# Patient Record
Sex: MALE | Race: WHITE | Employment: FULL TIME | URBAN - METROPOLITAN AREA
[De-identification: names, ages, dates, MRNs, and addresses within clinical notes are randomized per-mention and may not be internally consistent; named-entity substitution may affect disease eponyms.]

---

## 2017-01-09 ENCOUNTER — OFFICE VISIT (OUTPATIENT)
Dept: INTERNAL MEDICINE CLINIC | Age: 52
End: 2017-01-09

## 2017-01-09 VITALS
HEART RATE: 98 BPM | DIASTOLIC BLOOD PRESSURE: 103 MMHG | BODY MASS INDEX: 35.7 KG/M2 | OXYGEN SATURATION: 98 % | HEIGHT: 70 IN | WEIGHT: 249.38 LBS | TEMPERATURE: 98 F | SYSTOLIC BLOOD PRESSURE: 157 MMHG | RESPIRATION RATE: 18 BRPM

## 2017-01-09 DIAGNOSIS — I10 HTN (HYPERTENSION), BENIGN: Primary | ICD-10-CM

## 2017-01-09 RX ORDER — AMLODIPINE BESYLATE 5 MG/1
5 TABLET ORAL DAILY
Qty: 30 TAB | Refills: 5 | Status: SHIPPED | OUTPATIENT
Start: 2017-01-09 | End: 2017-05-17 | Stop reason: ALTCHOICE

## 2017-01-09 RX ORDER — PENTOXIFYLLINE 400 MG/1
400 TABLET, EXTENDED RELEASE ORAL
COMMUNITY
Start: 2016-12-29 | End: 2017-03-13

## 2017-01-09 NOTE — MR AVS SNAPSHOT
Visit Information Date & Time Provider Department Dept. Phone Encounter #  
 1/9/2017  8:15 AM Princess Millan MD [facility] [address] [facility] [id number] Follow-up Instructions Return in about 3 months (around 4/9/2017). Your Appointments 3/8/2017  9:20 AM  
ESTABLISHED PATIENT with Vin Abbott MD  
[facility] ([facility]) Appt Note: 6 month follow up  
 Mariia Holler [address]  
[address] 1000 [facility]  
  
    
 6/28/2017 10:00 AM  
ESTABLISHED PATIENT with Carlos Foster MD  
[facility] ([facility]) [address] [facility] 600 1007 [facility]  
54 [facility] Upcoming Health Maintenance Date Due FOBT Q 1 YEAR AGE 50-75 4/4/2016 INFLUENZA AGE 9 TO ADULT 8/1/2016 MICROALBUMIN Q1 5/3/2017 LIPID PANEL Q1 5/3/2017 HEMOGLOBIN A1C Q6M 5/16/2017 FOOT EXAM Q1 7/25/2017 EYE EXAM RETINAL OR DILATED Q1 10/7/2017 Pneumococcal 19-64 Highest Risk (3 of 3 - PPSV23) 9/1/2021 DTaP/Tdap/Td series (3 - Td) 12/14/2026 Allergies as of 1/9/2017  Review Complete On: 1/9/2017 By: Gabrielle Howe LPN Severity Noted Reaction Type Reactions Cleocin [Clindamycin Hcl]  11/29/2011    Rash Current Immunizations  Reviewed on 12/14/2016 Name Date Influenza Vaccine 9/12/2015, 9/28/2014,  Deferred (Patient Refused) Pneumococcal Polysaccharide (PPSV-23) 9/1/2016 Pneumococcal Vaccine (Pcv) 3/10/2009 TDAP Vaccine 9/16/2008 Tdap 12/14/2016 Not reviewed this visit You Were Diagnosed With   
  
 Codes Comments HTN (hypertension), benign    -  Primary ICD-10-CM: I10 
ICD-9-CM: 401.1 Vitals BP Pulse Temp Resp Height(growth percentile) Weight(growth percentile) (!) 157/103 (BP 1 Location: Left arm, BP Patient Position: Sitting) 98 98 °F (36.7 °C) (Oral) 18 5' 10\" (1.778 m) 249 lb 6 oz (113.1 kg) SpO2 BMI Smoking Status 98% 35.78 kg/m2 Never Smoker Vitals History BMI and BSA Data Body Mass Index Body Surface Area 35.78 kg/m 2 2.36 m 2 Preferred Pharmacy Pharmacy Name Phone [facility] [address] [place] [address] [address] Your Updated Medication List  
  
   
This list is accurate as of: 1/9/17  8:59 AM.  Always use your most recent med list. amLODIPine 5 mg tablet Commonly known as:  Jamie Ruths Take 1 Tab by mouth daily. aspirin delayed-release 81 mg tablet Take 1 Tab by mouth daily. atorvastatin 80 mg tablet Commonly known as:  LIPITOR Take 1 Tab by mouth nightly. clopidogrel 75 mg Tab Commonly known as:  PLAVIX TAKE ONE TABLET BY MOUTH ONCE DAILY fosinopril 40 mg tablet Commonly known as:  MONOPRIL  
TAKE ONE TABLET BY MOUTH ONCE DAILY  
  
 glipiZIDE SR 10 mg CR tablet Commonly known as:  GLUCOTROL XL Take 10 mg by mouth daily. hydroCHLOROthiazide 25 mg tablet Commonly known as:  HYDRODIURIL  
TAKE ONE TABLET BY MOUTH ONCE DAILY JARDIANCE 25 mg tablet Generic drug:  empagliflozin 25 mg.  
  
 metFORMIN 1,000 mg tablet Commonly known as:  GLUCOPHAGE  
TAKE ONE TABLET BY MOUTH TWICE DAILY WITH  MEALS  
  
 miSOPROStol 200 mcg tablet Commonly known as:  CYTOTEC  
TAKE ONE TABLET BY MOUTH TWICE DAILY  
  
 multivitamin tablet Commonly known as:  ONE A DAY Take 1 Tab by mouth daily. omega-3 acid ethyl esters 1 gram capsule Commonly known as:  Kristen Spatz TAKE TWO CAPSULES BY MOUTH TWICE DAILY WITH FOOD  
  
 ONETOUCH ULTRA TEST strip Generic drug:  glucose blood VI test strips ONETOUCH ULTRA2 monitoring kit Generic drug:  Blood-Glucose Meter OTHER  
  
 pantoprazole 40 mg tablet Commonly known as:  PROTONIX Take 1 Tab by mouth daily. pentoxifylline  mg CR tablet Commonly known as:  TRENTAL Take 400 mg by mouth three (3) times daily (with meals). SITagliptin 100 mg tablet Commonly known as:  Portia Bannister Take 1 Tab by mouth daily. TESTOSTERONE IM  
by IntraMUSCular route. Every 10 weeks. Aveed Prescriptions Sent to Pharmacy Refills  
 amLODIPine (NORVASC) 5 mg tablet 5 Sig: Take 1 Tab by mouth daily. Class: Normal  
 Pharmacy: [address]  #: [phone] Route: Oral  
  
Follow-up Instructions Return in about 3 months (around 4/9/2017). Introducing [facility] & HEALTH SERVICES! Dear Lara Ortiz: Thank you for requesting a [employer] account. Our records indicate that you already have an active [employer] account. You can access your account anytime at [email or web address]. Did you know that you can access your hospital and ER discharge instructions at any time in [employer]? You can also review all of your test results from your hospital stay or ER visit. Additional Information If you have questions, please visit the Frequently Asked Questions section of the [employer] website at [email or web address]. Remember, [employer] is NOT to be used for urgent needs. For medical emergencies, dial 911. Now available from your iPhone and Android! Please provide this summary of care documentation to your next provider. Your primary care clinician is listed as Juan Gutierrez. If you have any questions after today's visit, please call [phone].

## 2017-01-09 NOTE — PROGRESS NOTES
HISTORY OF PRESENT ILLNESS  Autumn Carlos is a 49 y.o. male. HPI  Hypertension:  Autumn Carlos is a 49 y.o. male with hypertension. without Chronic kidney disease stage    Medication change since last visit: No  The patient reports:  taking medications as instructed, no medication side effects noted, home BP monitoring in range of 127'J systolic over 02'G diastolic, no chest pain on exertion, no dyspnea on exertion, no swelling of ankles, no orthostatic dizziness or lightheadedness, no palpitations. Lifestyle modification/social history: generally follows a low fat low cholesterol diet, generally follows a low sodium diet, exercises sporadically, nonsmoker    Lab Results   Component Value Date/Time    Sodium 138 03/08/2016 09:30 AM    Potassium 4.7 03/08/2016 09:30 AM    Chloride 98 03/08/2016 09:30 AM    CO2 25 03/08/2016 09:30 AM    Anion gap 6 10/03/2015 03:58 AM    Glucose 262 03/08/2016 09:30 AM    BUN 17 03/08/2016 09:30 AM    Creatinine 1.07 03/08/2016 09:30 AM    BUN/Creatinine ratio 16 03/08/2016 09:30 AM    GFR est AA 94 03/08/2016 09:30 AM    GFR est non-AA 81 03/08/2016 09:30 AM    Calcium 9.7 03/08/2016 09:30 AM           ROS    Physical Exam  Visit Vitals    BP (!) 157/103 (BP 1 Location: Left arm, BP Patient Position: Sitting)    Pulse 98    Temp 98 °F (36.7 °C) (Oral)    Resp 18    Ht 5' 10\" (1.778 m)    Wt 249 lb 6 oz (113.1 kg)    SpO2 98%    BMI 35.78 kg/m2       ASSESSMENT and PLAN  Diagnoses and all orders for this visit:    HTN (hypertension), benign - uncontrolled. Resume amlodipine. Log blood pressures at home while sitting, relaxed 3-5 times weekly and bring to next visit. Pt educated on goal BP of 130/80 on average or lower. Call office as soon as possible if BP's over 140/90 or below 110/50 on multiple occasions and/or with symptoms of dizziness, chest pain, shortness of breath, headache or ankle swelling.   Recheck log and bp here in 3 month(s) with me, 2 months with Dr. Deonna Ho. -     amLODIPine (NORVASC) 5 mg tablet; Take 1 Tab by mouth daily. Follow-up Disposition:  Return in about 3 months (around 4/9/2017).

## 2017-03-13 ENCOUNTER — OFFICE VISIT (OUTPATIENT)
Dept: CARDIOLOGY CLINIC | Age: 52
End: 2017-03-13

## 2017-03-13 VITALS
RESPIRATION RATE: 16 BRPM | WEIGHT: 258 LBS | OXYGEN SATURATION: 98 % | BODY MASS INDEX: 36.94 KG/M2 | SYSTOLIC BLOOD PRESSURE: 118 MMHG | HEIGHT: 70 IN | DIASTOLIC BLOOD PRESSURE: 70 MMHG | HEART RATE: 80 BPM

## 2017-03-13 DIAGNOSIS — I48.91 ATRIAL FIBRILLATION, UNSPECIFIED TYPE (HCC): Primary | ICD-10-CM

## 2017-03-13 NOTE — PROGRESS NOTES
HISTORY OF PRESENT ILLNESS  Suad Chavez is a 49 y.o. male. history of HTN , HLP, CAD/MI in 2008. S/p PCI LAD in 2008 and additional stent in LAD in 2011. He also has residual 70% RV branch stenosis. Admitted to [facility] with AF and RVR on 7/15 nuclear stress test at that time with no ischemia, apical attenuation ( sub endo mi) and EF 54%. Echo also on 7/15: EF 55-60% no rwma,  bicuspid AV and mild AS  Admitted to [facility] on 7/15 for recurrent AF. DOLORES showed no NEWTON, normal LV and tri leaflet AV with possible partial fusion of 2 leaflets, mild AS, AI and MR . He failed cardioversion with persistent AF   successful dc cardioversion on 7/15 while on multaq  Recurrent AF afterwards on 7/15  and eventually AF ablation with Dr. Alana Spangler on 9/15  Stopped toprol on his own accord on 11/15 on account of excessive sob  He has GB disease and stones 2015  Echo on 8/16:Left ventricle: Size was normal. Systolic function was normal by visual  assessment. Ejection fraction was estimated to be 60 %. There were no  regional wall motion abnormalities. Wall thickness was mildly increased. Aortic valve: There was an apparent echogenic mass of tissue appearing in  the outflow tract adherent to the right coronary cusp; unknown if  clinically significant. Leaflets exhibited moderately increased thickness,  moderate calcification, and mildly reduced cuspal separation. Transaortic  velocity was increased due to valvular stenosis. There was mild stenosis. VIRA was 2.0 cm2, peak/mean gradients were 29/17 mmHg, and the DI was 0.45. There was mild regurgitation. Aorta, systemic arteries:  The root exhibited normal size; however, the  ascending aorta was mildly enlarged; sinus of valsalva was 3.8 cm, the  ST-J was 3.5 cm, and the ascending aorta was 4.0 cm.            Past Medical History    Diagnosis  Date      ZAK (obstructive sleep apnea)  3/31/2010      Type II or unspecified type diabetes mellitus without mention of complication, not stated as uncontrolled ([facility])  3/31/2007      HTN (hypertension), benign  3/31/2010      Mixed hyperlipidemia  3/31/2010      GERD (gastroesophageal reflux disease)  3/31/2010      MI (myocardial infarction) ([facility])  2/26/2007          stent x2, 3rd stent ~5yrs later, Plavix      Obese  3/31/2010      Hypercholesterolemia         Arthritis         Headache(784.0)         Low serum testosterone level  07/2013      A-fib ([facility])  7/9/2015          Multaq Eliquis                    Past Surgical History    Procedure  Laterality  Date      Hx orthopaedic     2007          L Knee Arthroscopy      Pr cardiac surg procedure unlist                4 stents placed 2632,3275      Hx angioplasty            Hx afib ablation              History                  Social History      Marital Status:            Spouse Name:  N/A          Number of Children:  N/A      Years of Education:  N/A              Occupational History      Not on file.                  Social History Main Topics      Smoking status:  Never Smoker       Smokeless tobacco:  Never Used      Alcohol Use:  No      Drug Use:  No      Sexual Activity:            Partners:  Female          Birth Control/ Protection:  None                Other Topics  Concern      Not on file        Social History Narrative         HPI  Doing well no complaints continues to work as a  with no issues  Review of Systems   Respiratory: Negative. Cardiovascular: Negative. Visit Vitals    /70 (BP 1 Location: Left arm, BP Patient Position: Sitting)    Pulse 80    Resp 16    Ht 5' 10\" (1.778 m)    Wt 117 kg (258 lb)    SpO2 98%    BMI 37.02 kg/m2       Physical Exam   Neck: No JVD present. Carotid bruit is not present. Cardiovascular: Normal rate, regular rhythm and S2 normal.    Murmur heard. Systolic murmur is present with a grade of 1/6   Pulmonary/Chest: Effort normal and breath sounds normal.   Abdominal: Soft. Musculoskeletal: He exhibits no edema. Psychiatric: He has a normal mood and affect. Current Outpatient Prescriptions on File Prior to Visit   Medication Sig Dispense Refill    CARTIA  mg ER capsule TAKE ONE CAPSULE BY MOUTH DAILY 90 Cap 3    amLODIPine (NORVASC) 5 mg tablet Take 1 Tab by mouth daily. 30 Tab 5    clopidogrel (PLAVIX) 75 mg tab TAKE ONE TABLET BY MOUTH ONCE DAILY 90 Tab 3    miSOPROStol (CYTOTEC) 200 mcg tablet TAKE ONE TABLET BY MOUTH TWICE DAILY 60 Tab 5    hydroCHLOROthiazide (HYDRODIURIL) 25 mg tablet TAKE ONE TABLET BY MOUTH ONCE DAILY 90 Tab 0    fosinopril (MONOPRIL) 40 mg tablet TAKE ONE TABLET BY MOUTH ONCE DAILY 90 Tab 1    JARDIANCE 25 mg tablet 25 mg.      ONETOUCH ULTRA2 monitoring kit       atorvastatin (LIPITOR) 80 mg tablet Take 1 Tab by mouth nightly. 90 Tab 3    OTHER       glipiZIDE SR (GLUCOTROL) 10 mg CR tablet Take 10 mg by mouth daily.  pantoprazole (PROTONIX) 40 mg tablet Take 1 Tab by mouth daily. 1 Tab 1    ONETOUCH ULTRA TEST strip       aspirin delayed-release 81 mg tablet Take 1 Tab by mouth daily. 30 Tab 3    sitaGLIPtin (JANUVIA) 100 mg tablet Take 1 Tab by mouth daily. 90 Tab 0    metFORMIN (GLUCOPHAGE) 1,000 mg tablet TAKE ONE TABLET BY MOUTH TWICE DAILY WITH  MEALS 180 Tab 0    TESTOSTERONE IM by IntraMUSCular route. Every 10 weeks. Aveed      multivitamin (ONE A DAY) tablet Take 1 Tab by mouth daily.  omega-3 acid ethyl esters (LOVAZA) 1 gram capsule TAKE TWO CAPSULES BY MOUTH TWICE DAILY WITH FOOD 360 Cap 0     No current facility-administered medications on file prior to visit.       Lab Results   Component Value Date/Time    Cholesterol, total 143 02/03/2016 09:40 AM    HDL Cholesterol 50 02/03/2016 09:40 AM    LDL,Direct 80 07/10/2015 04:44 AM    LDL, calculated 66 02/03/2016 09:40 AM    VLDL, calculated 27 02/03/2016 09:40 AM    Triglyceride 137 02/03/2016 09:40 AM    CHOL/HDL Ratio 5.6 07/10/2015 04:44 AM ASSESSMENT and PLAN  PAF: he remains in NSR after AF ablation on 9/15. dronedarone stopped on 1/1/16. Previous holter with no AF and NSR. His [id number] VASC score is 3 with 3.2 % cva risk. Continue asa and plavix. Now off eliquis. He will alert me right away if any palpitations fatigue sob and off course chest pain. Stay off toprol for now since he feels much better without it and continue with cardizem  Once again He tells me that when he was in NSR he felt absolutely great therefore he is very symptomatic when in AF    CAD: stable with no symptoms, normal nuclear stress test on 7/15, no additional testing for now. His ECG shows NSR prwp anterior and no significant st t changes and small non diagnostic inferior q waves  Consider stress echo at the next office visit to follow up on cad and AS  HTN:controlled at this time  HLD: continue lipitor, well controlled lipids and closely followed by his PCP  AS: mild some fusion of 2 cusps. Continue echo surveillance. Discussed results of echo in 8/16. No changes in Aortic stenosis echogenic tissue in av outflow of unclear etiology. Discussed with patient for now no additional testing. Obesity: discussed importance of weight loss and exercise he has lost 12 lbs since his last office visit!   See him back in 6 months or sooner if any issues

## 2017-03-13 NOTE — MR AVS SNAPSHOT
Visit Information Date & Time Provider Department Dept. Phone Encounter #  
 3/13/2017 10:20 AM Justine Aleman MD [facility] [phone] [id number] Your Appointments 4/18/2017  9:45 AM  
ROUTINE CARE with Chantell Strickland MD  
[facility]) Appt Note: 3 month [address]  
  
   
 [phone]  
  
    
 6/28/2017 10:00 AM  
ESTABLISHED PATIENT with Tino Carrillo MD  
[facility] ([facility]) [address] [facility] [address] [place] [address] Road  
[address] [place] [address] [id number] [place] [address] Upcoming Health Maintenance Date Due FOBT Q 1 YEAR AGE 50-75 4/4/2016 INFLUENZA AGE 9 TO ADULT 8/1/2016 MICROALBUMIN Q1 5/3/2017 LIPID PANEL Q1 5/3/2017 HEMOGLOBIN A1C Q6M 5/16/2017 FOOT EXAM Q1 7/25/2017 EYE EXAM RETINAL OR DILATED Q1 10/7/2017 Pneumococcal 19-64 Highest Risk (3 of 3 - PPSV23) 9/1/2021 DTaP/Tdap/Td series (3 - Td) 12/14/2026 Allergies as of 3/13/2017  Review Complete On: 3/13/2017 By: Arcadio Robertson Severity Noted Reaction Type Reactions Cleocin [Clindamycin Hcl]  11/29/2011    Rash Current Immunizations  Reviewed on 12/14/2016 Name Date Influenza Vaccine 9/12/2015, 9/28/2014,  Deferred (Patient Refused) Pneumococcal Polysaccharide (PPSV-23) 9/1/2016 Pneumococcal Vaccine (Pcv) 3/10/2009 TDAP Vaccine 9/16/2008 Tdap 12/14/2016 Not reviewed this visit You Were Diagnosed With   
  
 Codes Comments Atrial fibrillation, unspecified type [facility]    -  Primary ICD-10-CM: I48.91 
ICD-9-CM: 427.31 Vitals  BP Pulse Resp Height(growth percentile) Weight(growth percentile) SpO2  
 118/70 (BP 1 Location: Left arm, BP Patient Position: Sitting) 80 16 5' 10\" (1.778 m) 258 lb (117 kg) 98% BMI Smoking Status 37.02 kg/m2 Never Smoker Vitals History BMI and BSA Data Body Mass Index Body Surface Area 37.02 kg/m 2 2.4 m 2 Preferred Pharmacy Pharmacy Name Phone [facility] [address] Your Updated Medication List  
  
   
This list is accurate as of: 3/13/17 11:44 AM.  Always use your most recent med list. amLODIPine 5 mg tablet Commonly known as:  Velia Croon Take 1 Tab by mouth daily. aspirin delayed-release 81 mg tablet Take 1 Tab by mouth daily. atorvastatin 80 mg tablet Commonly known as:  LIPITOR Take 1 Tab by mouth nightly. CARTIA  mg ER capsule Generic drug:  dilTIAZem CD  
TAKE ONE CAPSULE BY MOUTH DAILY  
  
 clopidogrel 75 mg Tab Commonly known as:  PLAVIX TAKE ONE TABLET BY MOUTH ONCE DAILY fosinopril 40 mg tablet Commonly known as:  MONOPRIL  
TAKE ONE TABLET BY MOUTH ONCE DAILY  
  
 glipiZIDE SR 10 mg CR tablet Commonly known as:  GLUCOTROL XL Take 10 mg by mouth daily. hydroCHLOROthiazide 25 mg tablet Commonly known as:  HYDRODIURIL  
TAKE ONE TABLET BY MOUTH ONCE DAILY JARDIANCE 25 mg tablet Generic drug:  empagliflozin 25 mg.  
  
 metFORMIN 1,000 mg tablet Commonly known as:  GLUCOPHAGE  
TAKE ONE TABLET BY MOUTH TWICE DAILY WITH  MEALS  
  
 miSOPROStol 200 mcg tablet Commonly known as:  CYTOTEC  
TAKE ONE TABLET BY MOUTH TWICE DAILY  
  
 multivitamin tablet Commonly known as:  ONE A DAY Take 1 Tab by mouth daily. omega-3 acid ethyl esters 1 gram capsule Commonly known as:  Nighat Rendon TAKE TWO CAPSULES BY MOUTH TWICE DAILY WITH FOOD  
  
 ONETOUCH ULTRA TEST strip Generic drug:  glucose blood VI test strips ONETOUCH ULTRA2 monitoring kit Generic drug:  Blood-Glucose Meter OTHER  
  
 pantoprazole 40 mg tablet Commonly known as:  PROTONIX Take 1 Tab by mouth daily. SITagliptin 100 mg tablet Commonly known as:  [place]leton Ree Take 1 Tab by mouth daily. TESTOSTERONE IM  
by IntraMUSCular route. Every 10 weeks. Aveed We Performed the Following AMB POC EKG ROUTINE W/ 12 LEADS, INTER & REP [66894 CPT(R)] Patient Instructions Follow up with Dr. Jesika Reynolds in 6 months. Introducing [facility] & HEALTH SERVICES! Dear Nicole Singh: Thank you for requesting a [employer] account. Our records indicate that you already have an active [employer] account. You can access your account anytime at [email or web address] Did you know that you can access your hospital and ER discharge instructions at any time in [employer]? You can also review all of your test results from your hospital stay or ER visit. Additional Information If you have questions, please visit the Frequently Asked Questions section of the [employer] website at [email or web address] Remember, [employer] is NOT to be used for urgent needs. For medical emergencies, dial 911. Now available from your iPhone and Android! Please provide this summary of care documentation to your next provider. Your primary care clinician is listed as Melchor Bean. If you have any questions after today's visit, please call [phone].

## 2017-03-28 ENCOUNTER — OFFICE VISIT (OUTPATIENT)
Dept: INTERNAL MEDICINE CLINIC | Age: 52
End: 2017-03-28

## 2017-03-28 VITALS
TEMPERATURE: 97.9 F | HEART RATE: 92 BPM | HEIGHT: 70 IN | SYSTOLIC BLOOD PRESSURE: 146 MMHG | BODY MASS INDEX: 36.99 KG/M2 | DIASTOLIC BLOOD PRESSURE: 96 MMHG | OXYGEN SATURATION: 98 % | RESPIRATION RATE: 14 BRPM | WEIGHT: 258.4 LBS

## 2017-03-28 DIAGNOSIS — M54.50 CHRONIC BILATERAL LOW BACK PAIN WITHOUT SCIATICA: Primary | ICD-10-CM

## 2017-03-28 DIAGNOSIS — I10 HTN (HYPERTENSION), BENIGN: ICD-10-CM

## 2017-03-28 DIAGNOSIS — G89.29 CHRONIC BILATERAL LOW BACK PAIN WITHOUT SCIATICA: Primary | ICD-10-CM

## 2017-03-28 DIAGNOSIS — M54.2 NECK PAIN: ICD-10-CM

## 2017-03-28 DIAGNOSIS — E11.9 TYPE 2 DIABETES MELLITUS WITHOUT COMPLICATION, WITHOUT LONG-TERM CURRENT USE OF INSULIN (HCC): ICD-10-CM

## 2017-03-28 NOTE — PROGRESS NOTES
HISTORY OF PRESENT ILLNESS  Josselin Merchant is a 49 y.o. male. HPI     He complains of lower back pain. Pt states that he has had disc issues in his lower back in the past and current lower back pain feels similar to the pain he had in the past. Pt was doing physical therapy for his back pain and stopped this in 01/2017. Reports he was feeling a lot better with physical therapy. Pt states that he is trying to do the exercises he was taught in therapy although he thinks that the traction he had at therapy really helped him. Reports current lower back pain is dull and is exacerbated by transitioning from a sitting to a standing position. He does not have pain radiating down BLE although states he has a history of sciatica. Pt has seen a spine specialist in the past and wonders if he should follow up with him again. He also complains of neck pain. Pt states that he has felt an intermittent crick in upper neck for the past few months. He recently developed pain in left side of lower neck that is exacerbated by movement. Pt denies pain, numbness, tingling, or weakness down arms. Pt does not take Ibuprofen and states he was warned against taking this medication. Diabetic Review of Systems - medication compliance: compliant all of the time, diabetic diet compliance: noncompliant some of the time, home glucose monitoring: is performed regularly, further diabetic ROS: no polyuria or polydipsia, no chest pain, dyspnea or TIA's, no numbness, tingling or pain in extremities. Other symptoms and concerns: Reports his sugars run high and readings are in the 150's. He states that he loves sweets. Hypertension ROS: taking medications as instructed, no medication side effects noted, no TIA's, no chest pain on exertion, no dyspnea on exertion, no swelling of ankles. New concerns: Bp was 146/96 in the office today. He complains of left elbow pain that has been present for the past few months.  He has noticed more swelling in left elbow. Pain occurs with rotation, extension, and lifting objects with left arm. He denies an injury that could have caused left elbow pain. Review of Systems   Musculoskeletal: Positive for back pain and neck pain. All other systems reviewed and are negative. Physical Exam   Constitutional: He is oriented to person, place, and time. He appears well-developed and well-nourished. HENT:   Head: Normocephalic and atraumatic. Right Ear: External ear normal.   Left Ear: External ear normal.   Nose: Nose normal.   Mouth/Throat: Oropharynx is clear and moist.   Eyes: Conjunctivae and EOM are normal.   Neck: Normal range of motion. Neck supple. Carotid bruit is not present. No thyroid mass and no thyromegaly present. Cardiovascular: Normal rate, regular rhythm, normal heart sounds and intact distal pulses. Pulmonary/Chest: Effort normal and breath sounds normal.   Abdominal: Soft. Bowel sounds are normal.   Genitourinary: Testes normal.   Musculoskeletal: Normal range of motion. He exhibits tenderness (left side of neck/scapula). Left elbow: He exhibits swelling (mild). Neurological: He is alert and oriented to person, place, and time. He has normal strength. No cranial nerve deficit or sensory deficit. Skin: Skin is warm and dry. Psychiatric: He has a normal mood and affect. His behavior is normal. Judgment and thought content normal.   Nursing note and vitals reviewed. ASSESSMENT and PLAN  Saurav Flood was seen today for back pain. Diagnoses and all orders for this visit:    Chronic bilateral low back pain without sciatica  I wrote pt a prescription for physical therapy to evaluate and treat neck and back pain. I provided pt with [facility] physical therapy contact information. If pt does not experience relief with physical therapy then I will refer him to spine specialist. I wrote pt a note taking him out of work tomorrow.      Neck pain  Pt should take Naprosyn or Aleve for 7-10 days and I told pt this is okay for him to take with his kidney function and as long as it is short term. I am avoiding steroids because of his sugars. Type 2 diabetes mellitus without complication, without long-term current use of insulin (HCC)  Reports sugars are running higher although he states it is his fault and he loves sweets. Pt should continue current medications and working on diet. HTN (hypertension), benign  BP is  Not at goal but he is in pain right now I do not recommend any change in medications. I told pt that physical therapy may help left elbow pain although he should see ortho for this pain as they may be able to inject it.    reviewed diet, exercise and weight control  reviewed medications and side effects in detail     Written by Pro Mo, as dictated by Huber Lucio MD.     Current diagnosis and concerns discussed with pt at length. Understands risks and benefits or current treatment plan and medications and accepts the treatment and medication with any possible risks. Pt asks appropriate questions which were answered. Pt instructed to call with any concerns or problems.

## 2017-04-18 ENCOUNTER — OFFICE VISIT (OUTPATIENT)
Dept: INTERNAL MEDICINE CLINIC | Age: 52
End: 2017-04-18

## 2017-04-18 VITALS
HEART RATE: 95 BPM | BODY MASS INDEX: 36.99 KG/M2 | DIASTOLIC BLOOD PRESSURE: 87 MMHG | WEIGHT: 258.38 LBS | RESPIRATION RATE: 18 BRPM | HEIGHT: 70 IN | TEMPERATURE: 98.1 F | SYSTOLIC BLOOD PRESSURE: 128 MMHG | OXYGEN SATURATION: 97 %

## 2017-04-18 DIAGNOSIS — E11.9 TYPE 2 DIABETES MELLITUS WITHOUT COMPLICATION, WITHOUT LONG-TERM CURRENT USE OF INSULIN (HCC): ICD-10-CM

## 2017-04-18 DIAGNOSIS — I10 HTN (HYPERTENSION), BENIGN: Primary | ICD-10-CM

## 2017-04-18 DIAGNOSIS — E78.2 MIXED HYPERLIPIDEMIA: ICD-10-CM

## 2017-04-18 NOTE — PROGRESS NOTES
HISTORY OF PRESENT ILLNESS  Kelsey Castillo is a 47 y.o. male. HPI  Hypertension:  Kelsey Castillo is a 47 y.o. male with hypertension. without Chronic kidney disease stage    Medication change since last visit: No  The patient reports:  taking medications as instructed, no medication side effects noted, home BP monitoring in range of 486-552'T systolic over 48-20'G diastolic, no TIA's, no chest pain on exertion, no dyspnea on exertion, no swelling of ankles, no orthostatic dizziness or lightheadedness, no palpitations. Lifestyle modification/social history: generally follows a low fat low cholesterol diet, nonsmoker    Lab Results   Component Value Date/Time    Sodium 138 03/08/2016 09:30 AM    Potassium 4.7 03/08/2016 09:30 AM    Chloride 98 03/08/2016 09:30 AM    CO2 25 03/08/2016 09:30 AM    Anion gap 6 10/03/2015 03:58 AM    Glucose 262 03/08/2016 09:30 AM    BUN 17 03/08/2016 09:30 AM    Creatinine 1.07 03/08/2016 09:30 AM    BUN/Creatinine ratio 16 03/08/2016 09:30 AM    GFR est AA 94 03/08/2016 09:30 AM    GFR est non-AA 81 03/08/2016 09:30 AM    Calcium 9.7 03/08/2016 09:30 AM     Recent neck strain. Now in PT and seeing improvement. Diabetes:    He sees endocrine and due for followup there. Hyperlipidemia:  Kelsey Castillo is following up on his dyslipidemia. Cardiovascular risks for him are: LDL goal is under 80  diabetic  hypertension  obese.    Currently he takes Lipitor (atorvastatin) ,   Lab Results   Component Value Date/Time    Cholesterol, total 143 02/03/2016 09:40 AM    Cholesterol, total 169 07/10/2015 04:44 AM    Cholesterol, total 113 03/13/2015 09:12 AM    Cholesterol, total 212 06/27/2014 09:58 AM    Cholesterol, total 150 10/21/2013 09:04 AM    HDL Cholesterol 50 02/03/2016 09:40 AM    HDL Cholesterol 30 07/10/2015 04:44 AM    HDL Cholesterol 36 03/13/2015 09:12 AM    HDL Cholesterol 43 06/27/2014 09:58 AM    HDL Cholesterol 41 10/21/2013 09:04 AM    LDL,Direct 80 07/10/2015 04:44 AM    LDL, calculated 66 02/03/2016 09:40 AM    LDL, calculated Not calculated due to elevated triglyceride level 07/10/2015 04:44 AM    LDL, calculated 43 03/13/2015 09:12 AM    LDL, calculated Comment 06/27/2014 09:58 AM    LDL, calculated 57 10/21/2013 09:04 AM    Triglyceride 137 02/03/2016 09:40 AM    Triglyceride 805 07/10/2015 04:44 AM    Triglyceride 171 03/13/2015 09:12 AM    Triglyceride 408 06/27/2014 09:58 AM    Triglyceride 260 10/21/2013 09:04 AM    CHOL/HDL Ratio 5.6 07/10/2015 04:44 AM     Lab Results   Component Value Date/Time    ALT (SGPT) 37 03/08/2016 09:30 AM    AST (SGOT) 20 03/08/2016 09:30 AM    Alk.  phosphatase 69 03/08/2016 09:30 AM    Bilirubin, direct 0.17 02/03/2016 09:40 AM    Bilirubin, total 0.7 03/08/2016 09:30 AM       Myalgias: no  Fatigue: no      Patient Active Problem List   Diagnosis Code    Type II diabetes mellitus (HCC) E11.9    HTN (hypertension), benign I10    Mixed hyperlipidemia E78.2    Coronary Atherosclerosis of Native Coronary Artery- restenosis and restenting 6/2011-Dr. Tirado I25.10    GERD (gastroesophageal reflux disease) K21.9    MI (myocardial infarction) ([facility]) I21.3    Obese E66.9    ZAK (obstructive sleep apnea) G47.33    Colon polyp K63.5    Depression F32.9    Chest pain, unspecified R07.9    A-fib ([facility]) I48.91    ED (erectile dysfunction) N52.9    Hypogonadism in male E29.1    S/P ablation of atrial fibrillation Z98.890, Z86.79    Cellulitis, neck L03.221    Cholelithiasis without cholecystitis K80.20    Acute kidney failure (HCC) N17.9    Chest pain R07.9    Gastroparesis K31.84     Past Medical History:   Diagnosis Date    A[facility] 7/9/2015    Multaq, Eliquis    Arthritis     GERD (gastroesophageal reflux disease) 3/31/2010    Headache     HTN (hypertension), benign 3/31/2010    Hypercholesterolemia     Low serum testosterone level 07/2013    MI (myocardial infarction) ([facility] 2/26/2007    stent x2, 3rd stent ~5yrs later, Plavix    Mixed hyperlipidemia 3/31/2010    Obese 3/31/2010    ZAK (obstructive sleep apnea) 3/31/2010    Type II or unspecified type diabetes mellitus without mention of complication, not stated as uncontrolled 3/31/2007     Allergies   Allergen Reactions    Cleocin [Clindamycin Hcl] Rash     Current Outpatient Prescriptions on File Prior to Visit   Medication Sig Dispense Refill    hydroCHLOROthiazide (HYDRODIURIL) 25 mg tablet TAKE ONE TABLET BY MOUTH ONCE DAILY 90 Tab 2    CARTIA  mg ER capsule TAKE ONE CAPSULE BY MOUTH DAILY 90 Cap 3    amLODIPine (NORVASC) 5 mg tablet Take 1 Tab by mouth daily. 30 Tab 5    clopidogrel (PLAVIX) 75 mg tab TAKE ONE TABLET BY MOUTH ONCE DAILY 90 Tab 3    omega-3 acid ethyl esters (LOVAZA) 1 gram capsule TAKE TWO CAPSULES BY MOUTH TWICE DAILY WITH FOOD 360 Cap 0    miSOPROStol (CYTOTEC) 200 mcg tablet TAKE ONE TABLET BY MOUTH TWICE DAILY 60 Tab 5    fosinopril (MONOPRIL) 40 mg tablet TAKE ONE TABLET BY MOUTH ONCE DAILY 90 Tab 1    JARDIANCE 25 mg tablet 25 mg.      ONETOUCH ULTRA2 monitoring kit       atorvastatin (LIPITOR) 80 mg tablet Take 1 Tab by mouth nightly. 90 Tab 3    OTHER       glipiZIDE SR (GLUCOTROL) 10 mg CR tablet Take 10 mg by mouth daily.  pantoprazole (PROTONIX) 40 mg tablet Take 1 Tab by mouth daily. 1 Tab 1    ONETOUCH ULTRA TEST strip       aspirin delayed-release 81 mg tablet Take 1 Tab by mouth daily. 30 Tab 3    sitaGLIPtin (JANUVIA) 100 mg tablet Take 1 Tab by mouth daily. 90 Tab 0    metFORMIN (GLUCOPHAGE) 1,000 mg tablet TAKE ONE TABLET BY MOUTH TWICE DAILY WITH  MEALS 180 Tab 0    TESTOSTERONE IM by IntraMUSCular route. Every 10 weeks. Aveed      multivitamin (ONE A DAY) tablet Take 1 Tab by mouth daily. No current facility-administered medications on file prior to visit.       Social History   Substance Use Topics    Smoking status: Never Smoker    Smokeless tobacco: Never Used    Alcohol use No           ROS    Physical Exam   Constitutional: He appears well-developed and well-nourished. No distress. /87 (BP 1 Location: Left arm, BP Patient Position: Sitting)  Pulse 95  Temp 98.1 °F (36.7 °C) (Oral)   Resp 18  Ht 5' 10\" (1.778 m)  Wt 258 lb 6 oz (117.2 kg)  SpO2 97%  BMI 37.07 kg/m2Body mass index is 37.07 kg/(m^2). HENT:   Mouth/Throat: Oropharynx is clear and moist.   Neck: No JVD present. Carotid bruit is not present. Cardiovascular: Normal rate, regular rhythm and intact distal pulses. Murmur heard. Pulmonary/Chest: Effort normal and breath sounds normal.   Musculoskeletal: He exhibits no edema. Neurological: He is alert. Skin: Skin is warm and dry. He is not diaphoretic. Nursing note and vitals reviewed. ASSESSMENT and PLAN  Narcisa Vallecillo was seen today for hypertension. Diagnoses and all orders for this visit:    HTN (hypertension), benign - Well controlled and stable. his medications were reviewed and refilled where necessary as noted below. Labs ordered as noted. -     METABOLIC PANEL, BASIC    Type 2 diabetes mellitus without complication, without long-term current use of insulin ([facility] - recheck labs. Follow up with endocrine.  -     HEMOGLOBIN A1C WITH EAG  -     MICROALBUMIN, UR, RAND W/ MICROALBUMIN/CREA RATIO    Mixed hyperlipidemia - recheck  -     LIPID PANEL      Follow-up Disposition:  Return in about 6 months (around 10/18/2017).

## 2017-04-18 NOTE — MR AVS SNAPSHOT
Visit Information Date & Time Provider Department Dept. Phone Encounter #  
 4/18/2017  9:45 AM Chase Ponce MD [facility] [address] [facility] [id number] Follow-up Instructions Return in about 6 months (around 10/18/2017). Your Appointments 6/28/2017 10:00 AM  
ESTABLISHED PATIENT with Nina Gonzalez MD  
[facility] ([address]) [address] [facility] [address] [place] [address] [facility] [address]  
[phone]  
  
   
 [address] [address]  
  
    
 9/18/2017 10:20 AM  
ESTABLISHED PATIENT with Daniele Beltran MD  
[facility] ([address]) Appt Note: 6 month follow up  
 [address]  
[address] [id number] Upcoming Health Maintenance Date Due FOBT Q 1 YEAR AGE 50-75 4/4/2016 INFLUENZA AGE 9 TO ADULT 8/1/2016 MICROALBUMIN Q1 5/3/2017 LIPID PANEL Q1 5/3/2017 HEMOGLOBIN A1C Q6M 5/16/2017 FOOT EXAM Q1 7/25/2017 EYE EXAM RETINAL OR DILATED Q1 10/7/2017 Pneumococcal 19-64 Highest Risk (3 of 3 - PPSV23) 9/1/2021 DTaP/Tdap/Td series (3 - Td) 12/14/2026 Allergies as of 4/18/2017  Review Complete On: 4/18/2017 By: Samuel Grijalva LPN Severity Noted Reaction Type Reactions Cleocin [Clindamycin Hcl]  11/29/2011    Rash Current Immunizations  Reviewed on 12/14/2016 Name Date Influenza Vaccine 10/1/2016, 9/12/2015, 9/28/2014,  Deferred (Patient Refused) Pneumococcal Polysaccharide (PPSV-23) 9/1/2016 Pneumococcal Vaccine (Pcv) 3/10/2009 TDAP Vaccine 9/16/2008 Tdap 12/14/2016 Not reviewed this visit You Were Diagnosed With   
  
 Codes Comments HTN (hypertension), benign    -  Primary ICD-10-CM: I10 
ICD-9-CM: 401.1  Type 2 diabetes mellitus without complication, without long-term current use of insulin ([facility]     ICD-10-CM: E11.9 ICD-9-CM: 250.00 Mixed hyperlipidemia     ICD-10-CM: E78.2 ICD-9-CM: 272.2 Vitals BP Pulse Temp Resp Height(growth percentile) Weight(growth percentile) 128/87 (BP 1 Location: Left arm, BP Patient Position: Sitting) 95 98.1 °F (36.7 °C) (Oral) 18 5' 10\" (1.778 m) 258 lb 6 oz (117.2 kg) SpO2 BMI Smoking Status 97% 37.07 kg/m2 Never Smoker Vitals History BMI and BSA Data Body Mass Index Body Surface Area  
 37.07 kg/m 2 2.41 m 2 Preferred Pharmacy Pharmacy Name Phone [facility] [address] [place] [address] Your Updated Medication List  
  
   
This list is accurate as of: 4/18/17 10:13 AM.  Always use your most recent med list. amLODIPine 5 mg tablet Commonly known as:  Ernie Durie Take 1 Tab by mouth daily. aspirin delayed-release 81 mg tablet Take 1 Tab by mouth daily. atorvastatin 80 mg tablet Commonly known as:  LIPITOR Take 1 Tab by mouth nightly. CARTIA  mg ER capsule Generic drug:  dilTIAZem CD  
TAKE ONE CAPSULE BY MOUTH DAILY  
  
 clopidogrel 75 mg Tab Commonly known as:  PLAVIX TAKE ONE TABLET BY MOUTH ONCE DAILY fosinopril 40 mg tablet Commonly known as:  MONOPRIL  
TAKE ONE TABLET BY MOUTH ONCE DAILY  
  
 glipiZIDE SR 10 mg CR tablet Commonly known as:  GLUCOTROL XL Take 10 mg by mouth daily. hydroCHLOROthiazide 25 mg tablet Commonly known as:  HYDRODIURIL  
TAKE ONE TABLET BY MOUTH ONCE DAILY JARDIANCE 25 mg tablet Generic drug:  empagliflozin 25 mg.  
  
 metFORMIN 1,000 mg tablet Commonly known as:  GLUCOPHAGE  
TAKE ONE TABLET BY MOUTH TWICE DAILY WITH  MEALS  
  
 miSOPROStol 200 mcg tablet Commonly known as:  CYTOTEC  
TAKE ONE TABLET BY MOUTH TWICE DAILY  
  
 multivitamin tablet Commonly known as:  ONE A DAY Take 1 Tab by mouth daily. omega-3 acid ethyl esters 1 gram capsule Commonly known as:  Ernie Soni TAKE TWO CAPSULES BY MOUTH TWICE DAILY WITH FOOD  
  
 ONETOUCH ULTRA TEST strip Generic drug:  glucose blood VI test strips ONETOUCH ULTRA2 monitoring kit Generic drug:  Blood-Glucose Meter OTHER  
  
 pantoprazole 40 mg tablet Commonly known as:  PROTONIX Take 1 Tab by mouth daily. SITagliptin 100 mg tablet Commonly known as:  Ok Pramod Take 1 Tab by mouth daily. TESTOSTERONE IM  
by IntraMUSCular route. Every 10 weeks. Aveed We Performed the Following HEMOGLOBIN A1C WITH EAG [31743 CPT(R)] LIPID PANEL [56631 CPT(R)] METABOLIC PANEL, BASIC [48153 CPT(R)] MICROALBUMIN, UR, RAND W/ MICROALBUMIN/CREA RATIO [id number] CPT(R)] Follow-up Instructions Return in about 6 months (around 10/18/2017). Introducing [facility]! Dear Natalia Maxwell: Thank you for requesting a [employer] account. Our records indicate that you already have an active [employer] account. You can access your account anytime at [email or web address] Did you know that you can access your hospital and ER discharge instructions at any time in [employer]? You can also review all of your test results from your hospital stay or ER visit. Additional Information If you have questions, please visit the Frequently Asked Questions section of the [employer] website at [email or web address] Remember, [employer] is NOT to be used for urgent needs. For medical emergencies, dial 911. Now available from your iPhone and Android! Please provide this summary of care documentation to your next provider. Your primary care clinician is listed as Natasha Campos. If you have any questions after today's visit, please call [phone].

## 2017-04-25 LAB
ALBUMIN/CREAT UR: 81.8 MG/G CREAT (ref 0–30)
BUN SERPL-MCNC: 15 MG/DL (ref 6–24)
BUN/CREAT SERPL: 13 (ref 9–20)
CALCIUM SERPL-MCNC: 10 MG/DL (ref 8.7–10.2)
CHLORIDE SERPL-SCNC: 98 MMOL/L (ref 96–106)
CHOLEST SERPL-MCNC: 208 MG/DL (ref 100–199)
CO2 SERPL-SCNC: 26 MMOL/L (ref 18–29)
CREAT SERPL-MCNC: 1.14 MG/DL (ref 0.76–1.27)
CREAT UR-MCNC: 47.9 MG/DL
EST. AVERAGE GLUCOSE BLD GHB EST-MCNC: 189 MG/DL
GLUCOSE SERPL-MCNC: 129 MG/DL (ref 65–99)
HBA1C MFR BLD: 8.2 % (ref 4.8–5.6)
HDLC SERPL-MCNC: 49 MG/DL
INTERPRETATION, 910389: NORMAL
LDLC SERPL CALC-MCNC: 95 MG/DL (ref 0–99)
Lab: NORMAL
MICROALBUMIN UR-MCNC: 39.2 UG/ML
POTASSIUM SERPL-SCNC: 4.7 MMOL/L (ref 3.5–5.2)
SODIUM SERPL-SCNC: 143 MMOL/L (ref 134–144)
TRIGL SERPL-MCNC: 321 MG/DL (ref 0–149)
VLDLC SERPL CALC-MCNC: 64 MG/DL (ref 5–40)

## 2017-05-08 LAB
ALBUMIN SERPL BCP-MCNC: 3.9 G/DL (ref 3.5–5)
ALBUMIN/GLOB SERPL: 0.9 {RATIO} (ref 1.1–2.2)
ALP SERPL-CCNC: 83 U/L (ref 45–117)
ALT SERPL-CCNC: 38 U/L (ref 12–78)
ANION GAP BLD CALC-SCNC: 9 MMOL/L (ref 5–15)
AST SERPL W P-5'-P-CCNC: 16 U/L (ref 15–37)
BASOPHILS # BLD AUTO: 0 K/UL (ref 0–0.1)
BASOPHILS # BLD: 0 % (ref 0–1)
BILIRUB SERPL-MCNC: 0.6 MG/DL (ref 0.2–1)
BUN SERPL-MCNC: 24 MG/DL (ref 6–20)
BUN/CREAT SERPL: 18 (ref 12–20)
CALCIUM SERPL-MCNC: 9.5 MG/DL (ref 8.5–10.1)
CHLORIDE SERPL-SCNC: 97 MMOL/L (ref 97–108)
CK SERPL-CCNC: 93 U/L (ref 39–308)
CO2 SERPL-SCNC: 31 MMOL/L (ref 21–32)
CREAT SERPL-MCNC: 1.31 MG/DL (ref 0.7–1.3)
EOSINOPHIL # BLD: 0.2 K/UL (ref 0–0.4)
EOSINOPHIL NFR BLD: 2 % (ref 0–7)
ERYTHROCYTE [DISTWIDTH] IN BLOOD BY AUTOMATED COUNT: 12.9 % (ref 11.5–14.5)
GLOBULIN SER CALC-MCNC: 4.5 G/DL (ref 2–4)
GLUCOSE SERPL-MCNC: 247 MG/DL (ref 65–100)
HCT VFR BLD AUTO: 44.9 % (ref 36.6–50.3)
HGB BLD-MCNC: 15.6 G/DL (ref 12.1–17)
LYMPHOCYTES # BLD AUTO: 29 % (ref 12–49)
LYMPHOCYTES # BLD: 2.5 K/UL (ref 0.8–3.5)
MCH RBC QN AUTO: 29.8 PG (ref 26–34)
MCHC RBC AUTO-ENTMCNC: 34.7 G/DL (ref 30–36.5)
MCV RBC AUTO: 85.9 FL (ref 80–99)
MONOCYTES # BLD: 0.9 K/UL (ref 0–1)
MONOCYTES NFR BLD AUTO: 10 % (ref 5–13)
NEUTS SEG # BLD: 5 K/UL (ref 1.8–8)
NEUTS SEG NFR BLD AUTO: 59 % (ref 32–75)
PLATELET # BLD AUTO: 200 K/UL (ref 150–400)
POTASSIUM SERPL-SCNC: 3.9 MMOL/L (ref 3.5–5.1)
PROT SERPL-MCNC: 8.4 G/DL (ref 6.4–8.2)
RBC # BLD AUTO: 5.23 M/UL (ref 4.1–5.7)
SODIUM SERPL-SCNC: 137 MMOL/L (ref 136–145)
TROPONIN I SERPL-MCNC: <0.04 NG/ML
WBC # BLD AUTO: 8.6 K/UL (ref 4.1–11.1)

## 2017-05-08 PROCEDURE — 99285 EMERGENCY DEPT VISIT HI MDM: CPT

## 2017-05-08 PROCEDURE — 93005 ELECTROCARDIOGRAM TRACING: CPT

## 2017-05-08 PROCEDURE — 80053 COMPREHEN METABOLIC PANEL: CPT | Performed by: STUDENT IN AN ORGANIZED HEALTH CARE EDUCATION/TRAINING PROGRAM

## 2017-05-08 PROCEDURE — 85025 COMPLETE CBC W/AUTO DIFF WBC: CPT | Performed by: STUDENT IN AN ORGANIZED HEALTH CARE EDUCATION/TRAINING PROGRAM

## 2017-05-08 PROCEDURE — 84484 ASSAY OF TROPONIN QUANT: CPT | Performed by: STUDENT IN AN ORGANIZED HEALTH CARE EDUCATION/TRAINING PROGRAM

## 2017-05-08 PROCEDURE — 82550 ASSAY OF CK (CPK): CPT | Performed by: STUDENT IN AN ORGANIZED HEALTH CARE EDUCATION/TRAINING PROGRAM

## 2017-05-08 PROCEDURE — 36415 COLL VENOUS BLD VENIPUNCTURE: CPT | Performed by: STUDENT IN AN ORGANIZED HEALTH CARE EDUCATION/TRAINING PROGRAM

## 2017-05-09 ENCOUNTER — APPOINTMENT (OUTPATIENT)
Dept: GENERAL RADIOLOGY | Age: 52
End: 2017-05-09
Attending: EMERGENCY MEDICINE
Payer: COMMERCIAL

## 2017-05-09 ENCOUNTER — HOSPITAL ENCOUNTER (EMERGENCY)
Age: 52
Discharge: HOME | End: 2017-05-09
Attending: EMERGENCY MEDICINE
Payer: COMMERCIAL

## 2017-05-09 VITALS
OXYGEN SATURATION: 94 % | BODY MASS INDEX: 38.39 KG/M2 | HEART RATE: 86 BPM | WEIGHT: 268.2 LBS | TEMPERATURE: 98.6 F | SYSTOLIC BLOOD PRESSURE: 168 MMHG | DIASTOLIC BLOOD PRESSURE: 92 MMHG | HEIGHT: 70 IN | RESPIRATION RATE: 18 BRPM

## 2017-05-09 DIAGNOSIS — R07.9 CHEST PAIN, UNSPECIFIED TYPE: Primary | ICD-10-CM

## 2017-05-09 DIAGNOSIS — I10 ESSENTIAL HYPERTENSION: ICD-10-CM

## 2017-05-09 LAB
ATRIAL RATE: 87 BPM
CALCULATED P AXIS, ECG09: 74 DEGREES
CALCULATED R AXIS, ECG10: 2 DEGREES
CALCULATED T AXIS, ECG11: 63 DEGREES
DIAGNOSIS, 93000: NORMAL
P-R INTERVAL, ECG05: 170 MS
Q-T INTERVAL, ECG07: 368 MS
QRS DURATION, ECG06: 92 MS
QTC CALCULATION (BEZET), ECG08: 442 MS
TROPONIN I SERPL-MCNC: <0.04 NG/ML
VENTRICULAR RATE, ECG03: 87 BPM

## 2017-05-09 PROCEDURE — 74011250637 HC RX REV CODE- 250/637: Performed by: EMERGENCY MEDICINE

## 2017-05-09 PROCEDURE — 36415 COLL VENOUS BLD VENIPUNCTURE: CPT | Performed by: EMERGENCY MEDICINE

## 2017-05-09 PROCEDURE — 84484 ASSAY OF TROPONIN QUANT: CPT | Performed by: EMERGENCY MEDICINE

## 2017-05-09 PROCEDURE — 71020 XR CHEST PA LAT: CPT

## 2017-05-09 RX ORDER — ASPIRIN 325 MG
325 TABLET ORAL
Status: COMPLETED | OUTPATIENT
Start: 2017-05-09 | End: 2017-05-09

## 2017-05-09 RX ADMIN — ASPIRIN 325 MG: 325 TABLET ORAL at 04:31

## 2017-05-09 NOTE — DISCHARGE INSTRUCTIONS
Chest Pain: Care Instructions  Your Care Instructions  There are many things that can cause chest pain. Some are not serious and will get better on their own in a few days. But some kinds of chest pain need more testing and treatment. Your doctor may have recommended a follow-up visit in the next 8 to 12 hours. If you are not getting better, you may need more tests or treatment. Even though your doctor has released you, you still need to watch for any problems. The doctor carefully checked you, but sometimes problems can develop later. If you have new symptoms or if your symptoms do not get better, get medical care right away. If you have worse or different chest pain or pressure that lasts more than 5 minutes or you passed out (lost consciousness), call 911 or seek other emergency help right away. A medical visit is only one step in your treatment. Even if you feel better, you still need to do what your doctor recommends, such as going to all suggested follow-up appointments and taking medicines exactly as directed. This will help you recover and help prevent future problems. How can you care for yourself at home? · Rest until you feel better. · Take your medicine exactly as prescribed. Call your doctor if you think you are having a problem with your medicine. · Do not drive after taking a prescription pain medicine. When should you call for help? Call 911 if:  · You passed out (lost consciousness). · You have severe difficulty breathing. · You have symptoms of a heart attack. These may include:  ¨ Chest pain or pressure, or a strange feeling in your chest.  ¨ Sweating. ¨ Shortness of breath. ¨ Nausea or vomiting. ¨ Pain, pressure, or a strange feeling in your back, neck, jaw, or upper belly or in one or both shoulders or arms. ¨ Lightheadedness or sudden weakness. ¨ A fast or irregular heartbeat.   After you call 911, the  may tell you to chew 1 adult-strength or 2 to 4 low-dose aspirin. Wait for an ambulance. Do not try to drive yourself. Call your doctor today if:  · You have any trouble breathing. · Your chest pain gets worse. · You are dizzy or lightheaded, or you feel like you may faint. · You are not getting better as expected. · You are having new or different chest pain. Where can you learn more? Go to http://cipriano-santa.info/. Enter A120 in the search box to learn more about \"Chest Pain: Care Instructions. \"  Current as of: May 27, 2016  Content Version: 11.2  © 5822-0003 [employer]. Care instructions adapted under license by [employer] (which disclaims liability or warranty for this information). If you have questions about a medical condition or this instruction, always ask your healthcare professional. [address] any warranty or liability for your use of this information. High Blood Pressure: Care Instructions  Your Care Instructions  If your blood pressure is usually above 140/90, you have high blood pressure, or hypertension. That means the top number is 140 or higher or the bottom number is 90 or higher, or both. Despite what a lot of people think, high blood pressure usually doesn't cause headaches or make you feel dizzy or lightheaded. It usually has no symptoms. But it does increase your risk for heart attack, stroke, and kidney or eye damage. The higher your blood pressure, the more your risk increases. Your doctor will give you a goal for your blood pressure. Your goal will be based on your health and your age. An example of a goal is to keep your blood pressure below 140/90. Lifestyle changes, such as eating healthy and being active, are always important to help lower blood pressure. You might also take medicine to reach your blood pressure goal.  Follow-up care is a key part of your treatment and safety.  Be sure to make and go to all appointments, and call your doctor if you are having problems. It's also a good idea to know your test results and keep a list of the medicines you take. How can you care for yourself at home? Medical treatment  · If you stop taking your medicine, your blood pressure will go back up. You may take one or more types of medicine to lower your blood pressure. Be safe with medicines. Take your medicine exactly as prescribed. Call your doctor if you think you are having a problem with your medicine. · Talk to your doctor before you start taking aspirin every day. Aspirin can help certain people lower their risk of a heart attack or stroke. But taking aspirin isn't right for everyone, because it can cause serious bleeding. · See your doctor regularly. You may need to see the doctor more often at first or until your blood pressure comes down. · If you are taking blood pressure medicine, talk to your doctor before you take decongestants or anti-inflammatory medicine, such as ibuprofen. Some of these medicines can raise blood pressure. · Learn how to check your blood pressure at home. Lifestyle changes  · Stay at a healthy weight. This is especially important if you put on weight around the waist. Losing even 10 pounds can help you lower your blood pressure. · If your doctor recommends it, get more exercise. Walking is a good choice. Bit by bit, increase the amount you walk every day. Try for at least 30 minutes on most days of the week. You also may want to swim, bike, or do other activities. · Avoid or limit alcohol. Talk to your doctor about whether you can drink any alcohol. · Try to limit how much sodium you eat to less than 2,300 milligrams (mg) a day. Your doctor may ask you to try to eat less than 1,500 mg a day. · Eat plenty of fruits (such as bananas and oranges), vegetables, legumes, whole grains, and low-fat dairy products. · Lower the amount of saturated fat in your diet. Saturated fat is found in animal products such as milk, cheese, and meat. Limiting these foods may help you lose weight and also lower your risk for heart disease. · Do not smoke. Smoking increases your risk for heart attack and stroke. If you need help quitting, talk to your doctor about stop-smoking programs and medicines. These can increase your chances of quitting for good. When should you call for help? Call 911 anytime you think you may need emergency care. This may mean having symptoms that suggest that your blood pressure is causing a serious heart or blood vessel problem. Your blood pressure may be over 180/110. For example, call 911 if:  · You have symptoms of a heart attack. These may include:  ¨ Chest pain or pressure, or a strange feeling in the chest.  ¨ Sweating. ¨ Shortness of breath. ¨ Nausea or vomiting. ¨ Pain, pressure, or a strange feeling in the back, neck, jaw, or upper belly or in one or both shoulders or arms. ¨ Lightheadedness or sudden weakness. ¨ A fast or irregular heartbeat. · You have symptoms of a stroke. These may include:  ¨ Sudden numbness, tingling, weakness, or loss of movement in your face, arm, or leg, especially on only one side of your body. ¨ Sudden vision changes. ¨ Sudden trouble speaking. ¨ Sudden confusion or trouble understanding simple statements. ¨ Sudden problems with walking or balance. ¨ A sudden, severe headache that is different from past headaches. · You have severe back or belly pain. Do not wait until your blood pressure comes down on its own. Get help right away. Call your doctor now or seek immediate care if:  · Your blood pressure is much higher than normal (such as 180/110 or higher), but you don't have symptoms. · You think high blood pressure is causing symptoms, such as:  ¨ Severe headache. ¨ Blurry vision. Watch closely for changes in your health, and be sure to contact your doctor if:  · Your blood pressure measures 140/90 or higher at least 2 times.  That means the top number is 140 or higher or the bottom number is 90 or higher, or both. · You think you may be having side effects from your blood pressure medicine. · Your blood pressure is usually normal, but it goes above normal at least 2 times. Where can you learn more? Go to http://cipriano-santa.info/. Enter Q870 in the search box to learn more about \"High Blood Pressure: Care Instructions. \"  Current as of: August 8, 2016  Content Version: 11.2  © 0265-3034 [employer]. Care instructions adapted under license by [employer] (which disclaims liability or warranty for this information). If you have questions about a medical condition or this instruction, always ask your healthcare professional. [address] any warranty or liability for your use of this information. We hope that we have addressed all of your medical concerns. The examination and treatment you received in the Emergency Department were for an emergent problem and were not intended as complete care. It is important that you follow up with your healthcare provider(s) for ongoing care. If your symptoms worsen or do not improve as expected, and you are unable to reach your usual health care provider(s), you should return to the Emergency Department. Today's healthcare is undergoing tremendous change, and patient satisfaction surveys are one of the many tools to assess the quality of medical care. You may receive a survey from the [employer] regarding your experience in the Emergency Department. I hope that your experience has been completely positive, particularly the medical care that I provided. As such, please participate in the survey; anything less than excellent does not meet my expectations or intentions. [facility] and [facility] participate in nationally recognized quality of care measures.   If your blood pressure is greater than 120/80, as reported below, we urge that you seek medical care to address the potential of high blood pressure, commonly known as hypertension. Hypertension can be hereditary or can be caused by certain medical conditions, pain, stress, or \"white coat syndrome. \"       Please make an appointment with your health care provider(s) for follow up of your Emergency Department visit. VITALS:   Patient Vitals for the past 8 hrs:   Temp Pulse Resp BP SpO2   05/09/17 0245 - 83 - (!) 168/96 97 %   05/09/17 0230 - 83 - (!) 168/94 94 %   05/09/17 0223 - 85 16 (!) 174/99 -   05/09/17 0200 - 85 - (!) 193/99 98 %   05/09/17 0130 - 82 - (!) 170/93 95 %   05/08/17 2238 97.4 °F (36.3 °C) 91 18 (!) 226/119 96 %          Thank you for allowing us to provide you with medical care today. We realize that you have many choices for your emergency care needs. Please choose us in the future for any continued health care needs. Mali Ovalle, [address] [place] [address] 20.   Office: [phone]            Recent Results (from the past 24 hour(s))   EKG, 12 LEAD, INITIAL    Collection Time: 05/08/17 10:42 PM   Result Value Ref Range    Ventricular Rate 87 BPM    Atrial Rate 87 BPM    P-R Interval 170 ms    QRS Duration 92 ms    Q-T Interval 368 ms    QTC Calculation (Bezet) 442 ms    Calculated P Axis 74 degrees    Calculated R Axis 2 degrees    Calculated T Axis 63 degrees    Diagnosis       Normal sinus rhythm  When compared with ECG of 02-OCT-2015 00:54,  No significant change was found     CBC WITH AUTOMATED DIFF    Collection Time: 05/08/17 10:48 PM   Result Value Ref Range    WBC 8.6 4.1 - 11.1 K/uL    RBC 5.23 4. 10 - 5.70 M/uL    HGB 15.6 12.1 - 17.0 g/dL    HCT 44.9 36.6 - 50.3 %    MCV 85.9 80.0 - 99.0 FL    MCH 29.8 26.0 - 34.0 PG    MCHC 34.7 30.0 - 36.5 g/dL    RDW 12.9 11.5 - 14.5 %    PLATELET 738 949 - 297 K/uL    NEUTROPHILS 59 32 - 75 %    LYMPHOCYTES 29 12 - 49 %    MONOCYTES 10 5 - 13 % EOSINOPHILS 2 0 - 7 %    BASOPHILS 0 0 - 1 %    ABS. NEUTROPHILS 5.0 1.8 - 8.0 K/UL    ABS. LYMPHOCYTES 2.5 0.8 - 3.5 K/UL    ABS. MONOCYTES 0.9 0.0 - 1.0 K/UL    ABS. EOSINOPHILS 0.2 0.0 - 0.4 K/UL    ABS. BASOPHILS 0.0 0.0 - 0.1 K/UL   METABOLIC PANEL, COMPREHENSIVE    Collection Time: 05/08/17 10:48 PM   Result Value Ref Range    Sodium 137 136 - 145 mmol/L    Potassium 3.9 3.5 - 5.1 mmol/L    Chloride 97 97 - 108 mmol/L    CO2 31 21 - 32 mmol/L    Anion gap 9 5 - 15 mmol/L    Glucose 247 (H) 65 - 100 mg/dL    BUN 24 (H) 6 - 20 MG/DL    Creatinine 1.31 (H) 0.70 - 1.30 MG/DL    BUN/Creatinine ratio 18 12 - 20      GFR est AA >60 >60 ml/min/1.73m2    GFR est non-AA 58 (L) >60 ml/min/1.73m2    Calcium 9.5 8.5 - 10.1 MG/DL    Bilirubin, total 0.6 0.2 - 1.0 MG/DL    ALT (SGPT) 38 12 - 78 U/L    AST (SGOT) 16 15 - 37 U/L    Alk. phosphatase 83 45 - 117 U/L    Protein, total 8.4 (H) 6.4 - 8.2 g/dL    Albumin 3.9 3.5 - 5.0 g/dL    Globulin 4.5 (H) 2.0 - 4.0 g/dL    A-G Ratio 0.9 (L) 1.1 - 2.2     TROPONIN I    Collection Time: 05/08/17 10:48 PM   Result Value Ref Range    Troponin-I, Qt. <0.04 <0.05 ng/mL   CK W/ REFLX CKMB    Collection Time: 05/08/17 10:48 PM   Result Value Ref Range    CK 93 39 - 308 U/L   TROPONIN I    Collection Time: 05/09/17  3:05 AM   Result Value Ref Range    Troponin-I, Qt. <0.04 <0.05 ng/mL       Xr Chest Pa Lat    Result Date: 5/9/2017  INDICATION: Intermittent chest pain since Friday. Tonight pain moved into upper chest with heaviness in left arm. Hypertension. EXAM: CXR 2 View FINDINGS: Frontal and lateral views of the chest show clear lungs. Heart size is normal. There is no pulmonary edema. There is no evident pneumothorax, adenopathy or effusion. IMPRESSION: Normal two view chest x-ray.

## 2017-05-09 NOTE — ED TRIAGE NOTES
Triage note: Pt reports intermittent chest pain since Friday, pt states it felt like heart burn. Pt reports tonight the pain moved into upper chest and a heaviness in his left arm. Pt is hypertensive in triage 226/119.

## 2017-05-09 NOTE — ED PROVIDER NOTES
HPI Comments: 47 y.o. male with past medical history significant for MI (3 stents), HTN, Afib with ablation, GERD, DM who presents from home with chief complaint of chest pain. Pt reports gradually worsening symptoms of chest pain x 2 days. Pt describes initial symptoms as \"burning\" to mid to right chest yesterday. Symptoms resolved by bedtime. Pt states symptoms recurred today at 1800 with \"burning\" to mid to left chest with numbness radiating down left upper arm. Pain lasted from [id number] this evening, he is now pain free at time of assessement. Pt did not take any medications to assist with symptoms. He has been taking his regular medication as scheduled. Pt reports hx of similar symptoms preceding MI x 10 years ago where was evaluated for at [facility]. Pt denies SOB, diaphoresis, nausea or vomiting. There are no other acute medical concerns at this time. PCP: Arpit Hester MD  Cardiology: Duncan Briceño MD    Note written by Solis Emerson, as dictated by Marelyn Fabry, MD 3:07 AM    The history is provided by the patient and the spouse. No  was used.         Past Medical History:   Diagnosis Date    A-fib ([facility] [address] 7/9/2015    Multaq, Eliquis    Arthritis     GERD (gastroesophageal reflux disease) 3/31/2010    Headache     HTN (hypertension), benign 3/31/2010    Hypercholesterolemia     Low serum testosterone level 07/2013    MI (myocardial infarction) ([facility] [address] 2/26/2007    stent x2, 3rd stent ~5yrs later, Plavix    Mixed hyperlipidemia 3/31/2010    Obese 3/31/2010    ZAK (obstructive sleep apnea) 3/31/2010    Type II or unspecified type diabetes mellitus without mention of complication, not stated as uncontrolled 3/31/2007       Past Surgical History:   Procedure Laterality Date    CARDIAC SURG PROCEDURE UNLIST      4 stents placed 2009,2011    HX AFIB ABLATION      HX ANGIOPLASTY      HX ORTHOPAEDIC  2007    L Knee Arthroscopy         Family History:   Problem Relation Age of Onset    Arthritis-osteo Mother     Diabetes Mother     Elevated Lipids Mother     Heart Disease Mother     Hypertension Mother     Elevated Lipids Father     Heart Disease Father     Hypertension Father     Cancer Father      skin    Hypertension Brother     Diabetes Brother     Hypertension Brother     Diabetes Brother     Hypertension Brother        Social History     Social History    Marital status:      Spouse name: N/A    Number of children: N/A    Years of education: N/A     Occupational History    Not on file. Social History Main Topics    Smoking status: Never Smoker    Smokeless tobacco: Never Used    Alcohol use No    Drug use: No    Sexual activity: Yes     Partners: Female     Birth control/ protection: None     Other Topics Concern    Not on file     Social History Narrative         ALLERGIES: Cleocin [clindamycin hcl]    Review of Systems   Constitutional: Negative for chills, diaphoresis and fever. HENT: Negative for congestion, nosebleeds and sore throat. Eyes: Negative for pain and discharge. Respiratory: Negative for cough and shortness of breath. Cardiovascular: Positive for chest pain. Negative for palpitations. Gastrointestinal: Negative for abdominal pain, constipation, nausea and vomiting. Genitourinary: Negative for decreased urine volume, dysuria, flank pain and urgency. Musculoskeletal: Negative for gait problem and myalgias. Skin: Negative for rash and wound. Neurological: Negative for seizures and syncope. Hematological: Does not bruise/bleed easily. Psychiatric/Behavioral: Negative for confusion, self-injury and suicidal ideas. All other systems reviewed and are negative.       Vitals:    05/09/17 0200 05/09/17 0223 05/09/17 0230 05/09/17 0245   BP: (!) 193/99 (!) 174/99 (!) 168/94 (!) 168/96   Pulse: 85 85 83 83   Resp:  16     Temp:       SpO2: 98%  94% 97%   Weight:       Height:                Physical Exam Constitutional: He is oriented to person, place, and time. He appears well-developed and well-nourished. HENT:   Head: Normocephalic and atraumatic. Eyes: EOM are normal. Pupils are equal, round, and reactive to light. Neck: Normal range of motion. Neck supple. Cardiovascular: Normal rate, regular rhythm, normal heart sounds and intact distal pulses. Pulmonary/Chest: Effort normal and breath sounds normal. No respiratory distress. He has no wheezes. Abdominal: Soft. Bowel sounds are normal. There is no tenderness. There is no rebound and no guarding. Musculoskeletal: Normal range of motion. Neurological: He is alert and oriented to person, place, and time. Skin: Skin is warm and dry. Psychiatric: He has a normal mood and affect. His behavior is normal.   Nursing note and vitals reviewed. Note written by Solis San, as dictated by Jaime Alvarenga MD 3:08 AM    MDM  Number of Diagnoses or Management Options  Chest pain, unspecified type:   Essential hypertension:   Diagnosis management comments: 52yM with HTN, CAD p/w c/o burning chest pain radiating to LUE, now resolved. Similar pain to MI in past. Plan-ekg, monitor, cbc/cmp/ce, asa. Labs unremarkable    Recent echo shows nml EF and no wall motion abnl       Amount and/or Complexity of Data Reviewed  Clinical lab tests: ordered and reviewed  Tests in the radiology section of CPT®: ordered and reviewed  Review and summarize past medical records: yes  Independent visualization of images, tracings, or specimens: yes    Risk of Complications, Morbidity, and/or Mortality  Presenting problems: high  Diagnostic procedures: high  Management options: moderate    Patient Progress  Patient progress: improved    ED Course       Procedures     ED EKG interpretation:  Rhythm: normal sinus rhythm; and regular .  Rate (approx.): 87; Axis: normal; ST/T wave: normal; DE inteval normal. QRS interval. Unchanged from EKG performed 10/2015. Note written by Solis Arndt, as dictated by Elvira Phillips MD 4:00 AM    Pt would prefer 2 set CE in ER and then fu with Dr. Maribell Gutiérrez as outpt. Well appearing, nad, hd stable and cp free.

## 2017-05-17 ENCOUNTER — OFFICE VISIT (OUTPATIENT)
Dept: CARDIOLOGY CLINIC | Age: 52
End: 2017-05-17

## 2017-05-17 VITALS
HEIGHT: 70 IN | SYSTOLIC BLOOD PRESSURE: 160 MMHG | WEIGHT: 266.4 LBS | HEART RATE: 92 BPM | DIASTOLIC BLOOD PRESSURE: 88 MMHG | OXYGEN SATURATION: 98 % | BODY MASS INDEX: 38.14 KG/M2

## 2017-05-17 DIAGNOSIS — I48.91 ATRIAL FIBRILLATION, UNSPECIFIED TYPE (HCC): ICD-10-CM

## 2017-05-17 DIAGNOSIS — I10 HTN (HYPERTENSION), BENIGN: ICD-10-CM

## 2017-05-17 DIAGNOSIS — E66.01 MORBID OBESITY, UNSPECIFIED OBESITY TYPE (HCC): ICD-10-CM

## 2017-05-17 DIAGNOSIS — K21.9 GASTROESOPHAGEAL REFLUX DISEASE WITHOUT ESOPHAGITIS: ICD-10-CM

## 2017-05-17 DIAGNOSIS — I35.0 AORTIC VALVE STENOSIS, UNSPECIFIED ETIOLOGY: Primary | ICD-10-CM

## 2017-05-17 DIAGNOSIS — E78.2 MIXED HYPERLIPIDEMIA: ICD-10-CM

## 2017-05-17 RX ORDER — NEBIVOLOL 5 MG/1
5 TABLET ORAL DAILY
Qty: 14 TAB | Refills: 0 | Status: SHIPPED | COMMUNITY
Start: 2017-05-17 | End: 2017-06-09 | Stop reason: SDUPTHER

## 2017-05-17 RX ORDER — DILTIAZEM HYDROCHLORIDE 240 MG/1
240 CAPSULE, COATED, EXTENDED RELEASE ORAL DAILY
COMMUNITY
End: 2018-04-18 | Stop reason: SDUPTHER

## 2017-05-17 RX ORDER — NEBIVOLOL 5 MG/1
5 TABLET ORAL
COMMUNITY
End: 2017-06-09 | Stop reason: SDUPTHER

## 2017-05-17 RX ORDER — NEBIVOLOL 5 MG/1
5 TABLET ORAL DAILY
Qty: 14 TAB | Refills: 0 | Status: SHIPPED | COMMUNITY
Start: 2017-05-17 | End: 2017-06-09 | Stop reason: DRUGHIGH

## 2017-05-17 NOTE — MR AVS SNAPSHOT
Visit Information Date & Time Provider Department Dept. Phone Encounter #  
 5/17/2017  9:00 AM Jennifer Abad MD [facility] Bobbi Schrader [phone] [id number] Follow-up Instructions Return in about 2 weeks (around 5/31/2017). Follow-up and Disposition History Your Appointments 6/28/2017 10:00 AM  
ESTABLISHED PATIENT with Jarrod Mena MD  
[facility] ([address] [place] [address] Road) 320 [facility] 600 [facility] [address]  
[phone]  
  
   
 [address] [address]  
  
    
 9/18/2017 10:20 AM  
ESTABLISHED PATIENT with Jennifer Abad MD  
[facility] ([address] [place] [address]) Appt Note: 6 month follow up  
 [address] [facility] [address]  
[address] [address]  
  
    
 10/17/2017  9:45 AM  
ROUTINE CARE with Mariel Pedersen MD  
[facility] [address]) Appt Note: 6 month [address] [place] [address] [address]  
[phone]  
  
   
 [address] [facility] [address] Upcoming Health Maintenance Date Due FOBT Q 1 YEAR AGE 50-75 4/4/2016 FOOT EXAM Q1 7/25/2017 INFLUENZA AGE 9 TO ADULT 8/1/2017 EYE EXAM RETINAL OR DILATED Q1 10/7/2017 HEMOGLOBIN A1C Q6M 10/24/2017 MICROALBUMIN Q1 4/24/2018 LIPID PANEL Q1 4/24/2018 Pneumococcal 19-64 Highest Risk (3 of 3 - PPSV23) 9/1/2021 DTaP/Tdap/Td series (3 - Td) 12/14/2026 Allergies as of 5/17/2017  Review Complete On: 5/17/2017 By: Jennifer Abad MD  
  
 Severity Noted Reaction Type Reactions Cleocin [Clindamycin Hcl]  11/29/2011    Rash Current Immunizations  Reviewed on 12/14/2016 Name Date Influenza Vaccine 10/1/2016, 9/12/2015, 9/28/2014,  Deferred (Patient Refused) Pneumococcal Polysaccharide (PPSV-23) 9/1/2016 Pneumococcal Vaccine (Pcv) 3/10/2009 TDAP Vaccine 9/16/2008 Tdap 12/14/2016 Not reviewed this visit You Were Diagnosed With   
  
 Codes Comments Atrial fibrillation, unspecified type ([facility] 75.)    -  Primary ICD-10-CM: I48.91 
ICD-9-CM: 427.31 Aortic valve stenosis, unspecified etiology     ICD-10-CM: I35.0 ICD-9-CM: 424.1 HTN (hypertension), benign     ICD-10-CM: I10 
ICD-9-CM: 401.1 Mixed hyperlipidemia     ICD-10-CM: E78.2 ICD-9-CM: 272.2 Morbid obesity, unspecified obesity type     ICD-10-CM: E66.01 
ICD-9-CM: 278.01 Gastroesophageal reflux disease without esophagitis     ICD-10-CM: K21.9 ICD-9-CM: 530.81 Vitals BP Pulse Height(growth percentile) Weight(growth percentile) SpO2 BMI  
 160/88 (BP 1 Location: Left arm, BP Patient Position: Sitting) 92 5' 10\" (1.778 m) 266 lb 6.4 oz (120.8 kg) 98% 38.22 kg/m2 Smoking Status Never Smoker BMI and BSA Data Body Mass Index Body Surface Area  
 38.22 kg/m 2 2.44 m 2 Preferred Pharmacy Pharmacy Name Phone [facility] [address] [place] [address] Your Updated Medication List  
  
   
This list is accurate as of: 5/17/17 10:39 AM.  Always use your most recent med list.  
  
  
  
  
 aspirin delayed-release 81 mg tablet Take 1 Tab by mouth daily. atorvastatin 80 mg tablet Commonly known as:  LIPITOR Take 1 Tab by mouth nightly. BYSTOLIC 5 mg tablet Generic drug:  nebivolol Take 5 mg by mouth nightly. CARTIA  mg ER capsule Generic drug:  dilTIAZem CD Take 240 mg by mouth daily. clopidogrel 75 mg Tab Commonly known as:  PLAVIX TAKE ONE TABLET BY MOUTH ONCE DAILY fosinopril 40 mg tablet Commonly known as:  MONOPRIL  
TAKE ONE TABLET BY MOUTH ONCE DAILY  
  
 glipiZIDE SR 10 mg CR tablet Commonly known as:  GLUCOTROL XL Take 10 mg by mouth daily. hydroCHLOROthiazide 25 mg tablet Commonly known as:  HYDRODIURIL  
TAKE ONE TABLET BY MOUTH ONCE DAILY JARDIANCE 25 mg tablet Generic drug:  empagliflozin 25 mg.  
  
 metFORMIN 1,000 mg tablet Commonly known as:  GLUCOPHAGE  
TAKE ONE TABLET BY MOUTH TWICE DAILY WITH  MEALS  
  
 miSOPROStol 200 mcg tablet Commonly known as:  CYTOTEC  
TAKE ONE TABLET BY MOUTH TWICE DAILY  
  
 multivitamin tablet Commonly known as:  ONE A DAY Take 1 Tab by mouth daily. omega-3 acid ethyl esters 1 gram capsule Commonly known as:  Trenna Base TAKE TWO CAPSULES BY MOUTH TWICE DAILY WITH FOOD  
  
 ONETOUCH ULTRA TEST strip Generic drug:  glucose blood VI test strips ONETOUCH ULTRA2 monitoring kit Generic drug:  Blood-Glucose Meter OTHER  
  
 pantoprazole 40 mg tablet Commonly known as:  PROTONIX Take 1 Tab by mouth daily. SITagliptin 100 mg tablet Commonly known as:  Doroteo Slain Take 1 Tab by mouth daily. TESTOSTERONE IM  
by IntraMUSCular route. Every 10 weeks. Aveed Follow-up Instructions Return in about 2 weeks (around 5/31/2017). Patient Instructions Stop Norvasc Start Bystolic 5 mg at bedtime Nuclear stress test and Echo Follow up with Jovita Ramos in 2 weeks Patient Instructions History Introducing [facility]! Dear Vianey Mcguire: Thank you for requesting a [employer] account. Our records indicate that you already have an active [employer] account. You can access your account anytime at [email or web address] Did you know that you can access your hospital and ER discharge instructions at any time in [employer])? You can also review all of your test results from your hospital stay or ER visit. Additional Information If you have questions, please visit the Frequently Asked Questions section of the [employer] website at [email or web address] Remember, [employer] is NOT to be used for urgent needs. For medical emergencies, dial 911. Now available from your iPhone and Android! Please provide this summary of care documentation to your next provider. Your primary care clinician is listed as Francee Hashimoto. If you have any questions after today's visit, please call [phone].

## 2017-05-17 NOTE — PATIENT INSTRUCTIONS
Stop Norvasc     Start Bystolic 5 mg at bedtime    Nuclear stress test and Echo    Follow up with Mack Virgen in 2 weeks

## 2017-05-17 NOTE — PROGRESS NOTES
HISTORY OF PRESENT ILLNESS  Everardo Gonzales is a 47 y.o. male. history of HTN , HLP, CAD/MI in 2008. S/p PCI LAD in 2008 and additional stent in LAD in 2011. He also has residual 70% RV branch stenosis. Admitted to [facility] with AF and RVR on 7/15 nuclear stress test at that time with no ischemia, apical attenuation ( sub endo mi) and EF 54%. Echo also on 7/15: EF 55-60% no rwma,  bicuspid AV and mild AS  Admitted to [facility] on 7/15 for recurrent AF. DOLORES showed no NEWTON, normal LV and tri leaflet AV with possible partial fusion of 2 leaflets, mild AS, AI and MR . He failed cardioversion with persistent AF   successful dc cardioversion on 7/15 while on multaq  Recurrent AF afterwards on 7/15  and eventually AF ablation with Dr. Dolly Alvarenga on 9/15  Stopped toprol on his own accord on 11/15 on account of excessive sob  He has GB disease and stones 2015  Echo on 8/16:Left ventricle: Size was normal. Systolic function was normal by visual  assessment. Ejection fraction was estimated to be 60 %. There were no  regional wall motion abnormalities. Wall thickness was mildly increased. Aortic valve: There was an apparent echogenic mass of tissue appearing in  the outflow tract adherent to the right coronary cusp; unknown if  clinically significant. Leaflets exhibited moderately increased thickness,  moderate calcification, and mildly reduced cuspal separation. Transaortic  velocity was increased due to valvular stenosis. There was mild stenosis. VIRA was 2.0 cm2, peak/mean gradients were 29/17 mmHg, and the DI was 0.45. There was mild regurgitation. Aorta, systemic arteries: The root exhibited normal size; however, the  ascending aorta was mildly enlarged; sinus of valsalva was 3.8 cm, the  ST-J was 3.5 cm, and the ascending aorta was 4.0 cm.   ER visit for cp on 5/17 normal w/u but elevated BP                Past Medical History    Diagnosis  Date      ZAK (obstructive sleep apnea)  3/31/2010      Type II or unspecified type diabetes mellitus without mention of complication, not stated as uncontrolled ([facility])  3/31/2007      HTN (hypertension), benign  3/31/2010      Mixed hyperlipidemia  3/31/2010      GERD (gastroesophageal reflux disease)  3/31/2010      MI (myocardial infarction) ([facility])  2/26/2007          stent x2, 3rd stent ~5yrs later, Plavix      Obese  3/31/2010      Hypercholesterolemia         Arthritis         Headache(784.0)         Low serum testosterone level  07/2013      A-fib ([facility])  7/9/2015       [address]            Past Surgical History    Procedure  Laterality  Date      Hx orthopaedic     2007          L Knee Arthroscopy      Pr cardiac surg procedure unlist                4 stents placed 2542,9130      Hx angioplasty            Hx afib ablation              History                      Social History      Marital Status:            Spouse Name:  N/A          Number of Children:  N/A      Years of Education:  N/A                Occupational History      Not on file.                      Social History Main Topics      Smoking status:  Never Smoker       Smokeless tobacco:  Never Used      Alcohol Use:  No      Drug Use:  No      Sexual Activity:             Partners:  Female          Birth Control/ Protection:  None                   Other Topics  Concern      Not on file        Social History Narrative        HPI  No recurrent chest burning  htn still present a bit dizzy standing up  Review of Systems   Constitutional: Negative. Respiratory: Negative. Cardiovascular: Positive for chest pain. Negative for palpitations, orthopnea, claudication, leg swelling and PND. Neurological: Positive for dizziness. Visit Vitals    /88 (BP 1 Location: Left arm, BP Patient Position: Sitting)    Pulse 92    Ht 5' 10\" (1.778 m)    Wt 120.8 kg (266 lb 6.4 oz)    SpO2 98%    BMI 38.22 kg/m2         Physical Exam   Neck: Normal range of motion.  No JVD present. Carotid bruit is not present. No thyroid mass present. Cardiovascular: Normal rate, regular rhythm and S2 normal.    Murmur heard. Systolic murmur is present with a grade of 2/6   Pulmonary/Chest: Effort normal and breath sounds normal.   Abdominal: Soft. Musculoskeletal: He exhibits edema. Psychiatric: He has a normal mood and affect. Current Outpatient Prescriptions on File Prior to Visit   Medication Sig Dispense Refill    hydroCHLOROthiazide (HYDRODIURIL) 25 mg tablet TAKE ONE TABLET BY MOUTH ONCE DAILY 90 Tab 2    CARTIA  mg ER capsule TAKE ONE CAPSULE BY MOUTH DAILY 90 Cap 3    amLODIPine (NORVASC) 5 mg tablet Take 1 Tab by mouth daily. 30 Tab 5    clopidogrel (PLAVIX) 75 mg tab TAKE ONE TABLET BY MOUTH ONCE DAILY 90 Tab 3    miSOPROStol (CYTOTEC) 200 mcg tablet TAKE ONE TABLET BY MOUTH TWICE DAILY 60 Tab 5    fosinopril (MONOPRIL) 40 mg tablet TAKE ONE TABLET BY MOUTH ONCE DAILY 90 Tab 1    JARDIANCE 25 mg tablet 25 mg.      atorvastatin (LIPITOR) 80 mg tablet Take 1 Tab by mouth nightly. 90 Tab 3    glipiZIDE SR (GLUCOTROL) 10 mg CR tablet Take 10 mg by mouth daily.  pantoprazole (PROTONIX) 40 mg tablet Take 1 Tab by mouth daily. 1 Tab 1    ONETOUCH ULTRA TEST strip       sitaGLIPtin (JANUVIA) 100 mg tablet Take 1 Tab by mouth daily. 90 Tab 0    metFORMIN (GLUCOPHAGE) 1,000 mg tablet TAKE ONE TABLET BY MOUTH TWICE DAILY WITH  MEALS 180 Tab 0    TESTOSTERONE IM by IntraMUSCular route. Every 10 weeks. Aveed      multivitamin (ONE A DAY) tablet Take 1 Tab by mouth daily.  omega-3 acid ethyl esters (LOVAZA) 1 gram capsule TAKE TWO CAPSULES BY MOUTH TWICE DAILY WITH FOOD 360 Cap 0    ONETOUCH ULTRA2 monitoring kit       OTHER       aspirin delayed-release 81 mg tablet Take 1 Tab by mouth daily. 30 Tab 3     No current facility-administered medications on file prior to visit.       Lab Results   Component Value Date/Time    Cholesterol, total 208 04/24/2017 08:34 AM    HDL Cholesterol 49 04/24/2017 08:34 AM    LDL,Direct 80 07/10/2015 04:44 AM    LDL, calculated 95 04/24/2017 08:34 AM    VLDL, calculated 64 04/24/2017 08:34 AM    Triglyceride 321 04/24/2017 08:34 AM    CHOL/HDL Ratio 5.6 07/10/2015 04:44 AM         ASSESSMENT and PLAN  PAF: he remains in NSR after AF ablation on 9/15. dronedarone stopped on 1/1/16. Previous holter with no AF and NSR. His [id number] VASC score is 3 with 3.2 % cva risk. Continue asa and plavix. Now off eliquis. He will alert me right away if any palpitations fatigue sob and off course chest pain. Stay off toprol for now since he feels much better without it and continue with cardizem  Once again He tells me that when he was in NSR he felt absolutely great therefore he is very symptomatic when in AF    CAD:  Reviewed records from ER of 5/17  Visit for cp , w/u negative. Chest burning resolved and he thinks it is gi in origin but I am concern nonetheless . Proceed with nuclear stress test., normal nuclear stress test on 7/15  His ECG in er shows NSR prwp anterior and no significant st t changes and small non diagnostic inferior q waves  HTN: not controlled at this time, norvasc in combination with cardizem is causing too much le edema, this ubaldo be stopped and start bystolic 5 mg qhs side effects explained  HLD: not optimal , consider crestor 40 mg instead of lipitor  AS: mild some fusion of 2 cusps. Continue echo surveillance.  Proceed with echo  Obesity: discussed importance of weight loss and exercise he hasgained 11 lbs since his last office visit he tells me  See him back in 2 weeks

## 2017-05-17 NOTE — LETTER
NOTIFICATION RETURN TO WORK / SCHOOL 
 
5/17/2017 10:52 AM 
 
Mr. Yoni Andrade [address] [facility] [address] To Whom It May Concern: 
 
Yoni Andrade is currently under the care of [facility]. [facility]. He will not be able to go back to work until May 27,2017 If there are questions or concerns please have the patient contact our office.  
 
 
 
Sincerely, 
 
 
Yamini Matt MD

## 2017-05-26 ENCOUNTER — CLINICAL SUPPORT (OUTPATIENT)
Dept: CARDIOLOGY CLINIC | Age: 52
End: 2017-05-26

## 2017-05-26 DIAGNOSIS — R07.9 CHEST PAIN, UNSPECIFIED TYPE: ICD-10-CM

## 2017-05-26 DIAGNOSIS — I10 HTN (HYPERTENSION), BENIGN: ICD-10-CM

## 2017-05-26 DIAGNOSIS — I48.91 ATRIAL FIBRILLATION, UNSPECIFIED TYPE (HCC): ICD-10-CM

## 2017-05-26 DIAGNOSIS — E78.2 MIXED HYPERLIPIDEMIA: ICD-10-CM

## 2017-05-26 DIAGNOSIS — K21.9 GASTROESOPHAGEAL REFLUX DISEASE WITHOUT ESOPHAGITIS: ICD-10-CM

## 2017-05-26 DIAGNOSIS — Z98.61 HISTORY OF PTCA: ICD-10-CM

## 2017-05-26 DIAGNOSIS — Z86.79 S/P ABLATION OF ATRIAL FIBRILLATION: ICD-10-CM

## 2017-05-26 DIAGNOSIS — I35.0 AORTIC VALVE STENOSIS, UNSPECIFIED ETIOLOGY: ICD-10-CM

## 2017-05-26 DIAGNOSIS — Z98.890 S/P ABLATION OF ATRIAL FIBRILLATION: ICD-10-CM

## 2017-05-26 DIAGNOSIS — E66.01 MORBID OBESITY, UNSPECIFIED OBESITY TYPE (HCC): ICD-10-CM

## 2017-05-26 DIAGNOSIS — I25.2 PERSONAL HISTORY OF MI (MYOCARDIAL INFARCTION): ICD-10-CM

## 2017-05-26 DIAGNOSIS — R07.9 CHEST PAIN, UNSPECIFIED: ICD-10-CM

## 2017-05-26 NOTE — PROGRESS NOTES
See scanned document. Ordering Doctor:Dr. Charly Abel  Reading Doctor:Dr. Charyl Abel    Patient tolerated procedure well.

## 2017-06-01 ENCOUNTER — CLINICAL SUPPORT (OUTPATIENT)
Dept: CARDIOLOGY CLINIC | Age: 52
End: 2017-06-01

## 2017-06-01 DIAGNOSIS — I48.91 ATRIAL FIBRILLATION, UNSPECIFIED TYPE (HCC): Primary | ICD-10-CM

## 2017-06-01 DIAGNOSIS — I25.10 ATHEROSCLEROSIS OF NATIVE CORONARY ARTERY OF NATIVE HEART WITHOUT ANGINA PECTORIS: ICD-10-CM

## 2017-06-01 DIAGNOSIS — I10 HTN (HYPERTENSION), BENIGN: ICD-10-CM

## 2017-06-01 DIAGNOSIS — E78.2 MIXED HYPERLIPIDEMIA: ICD-10-CM

## 2017-06-01 NOTE — PROGRESS NOTES
See scanned document. Ordering Doctor:Dr. Bella Zurita  Reading Doctor:Dr. Bella Zurita    Patient tolerated procedure well.

## 2017-06-02 ENCOUNTER — TELEPHONE (OUTPATIENT)
Dept: CARDIOLOGY CLINIC | Age: 52
End: 2017-06-02

## 2017-06-09 ENCOUNTER — OFFICE VISIT (OUTPATIENT)
Dept: CARDIOLOGY CLINIC | Age: 52
End: 2017-06-09

## 2017-06-09 VITALS
DIASTOLIC BLOOD PRESSURE: 80 MMHG | OXYGEN SATURATION: 96 % | HEART RATE: 76 BPM | SYSTOLIC BLOOD PRESSURE: 138 MMHG | WEIGHT: 266.2 LBS | RESPIRATION RATE: 12 BRPM | HEIGHT: 70 IN | BODY MASS INDEX: 38.11 KG/M2

## 2017-06-09 DIAGNOSIS — E78.2 MIXED HYPERLIPIDEMIA: ICD-10-CM

## 2017-06-09 DIAGNOSIS — I48.91 ATRIAL FIBRILLATION, UNSPECIFIED TYPE (HCC): Primary | ICD-10-CM

## 2017-06-09 DIAGNOSIS — I10 HTN (HYPERTENSION), BENIGN: ICD-10-CM

## 2017-06-09 DIAGNOSIS — I35.0 AORTIC VALVE STENOSIS, UNSPECIFIED ETIOLOGY: ICD-10-CM

## 2017-06-09 DIAGNOSIS — K21.9 GASTROESOPHAGEAL REFLUX DISEASE WITHOUT ESOPHAGITIS: ICD-10-CM

## 2017-06-09 DIAGNOSIS — I25.10 ATHEROSCLEROSIS OF NATIVE CORONARY ARTERY OF NATIVE HEART WITHOUT ANGINA PECTORIS: ICD-10-CM

## 2017-06-09 DIAGNOSIS — E66.01 MORBID OBESITY, UNSPECIFIED OBESITY TYPE (HCC): ICD-10-CM

## 2017-06-09 DIAGNOSIS — I48.91 ATRIAL FIBRILLATION, UNSPECIFIED TYPE (HCC): ICD-10-CM

## 2017-06-09 RX ORDER — ROSUVASTATIN CALCIUM 40 MG/1
40 TABLET, COATED ORAL
COMMUNITY
End: 2017-06-09 | Stop reason: SDUPTHER

## 2017-06-09 RX ORDER — FENOFIBRATE 145 MG/1
145 TABLET, COATED ORAL DAILY
COMMUNITY
End: 2017-06-09 | Stop reason: SDUPTHER

## 2017-06-09 RX ORDER — NEBIVOLOL 10 MG/1
10 TABLET ORAL
COMMUNITY
End: 2017-06-09 | Stop reason: SDUPTHER

## 2017-06-09 RX ORDER — NEBIVOLOL 10 MG/1
10 TABLET ORAL
Qty: 30 TAB | Refills: 3 | Status: SHIPPED | OUTPATIENT
Start: 2017-06-09 | End: 2017-10-15 | Stop reason: SDUPTHER

## 2017-06-09 RX ORDER — FENOFIBRATE 145 MG/1
145 TABLET, COATED ORAL DAILY
Qty: 30 TAB | Refills: 3 | Status: SHIPPED | OUTPATIENT
Start: 2017-06-09 | End: 2017-10-15 | Stop reason: SDUPTHER

## 2017-06-09 RX ORDER — ROSUVASTATIN CALCIUM 40 MG/1
40 TABLET, COATED ORAL
Qty: 30 TAB | Refills: 3 | Status: SHIPPED | OUTPATIENT
Start: 2017-06-09 | End: 2017-10-15 | Stop reason: SDUPTHER

## 2017-06-09 NOTE — PROGRESS NOTES
Chief Complaint   Patient presents with    Results     To discuss nuclear and echo. Denies chest pain/shortness of breat/dizziness. Continued swelling, but improved. Not taking omega 3 due to no insurance coverage. Unsure if continuing testosterone injections. Not taking aspirin per Dr. Maribel Khan. Medication profile updated. No advance directives on file. Folder given to patient and wife. To inform nurse navigator for follow up regarding questions or concerns.

## 2017-06-09 NOTE — PROGRESS NOTES
HISTORY OF PRESENT ILLNESS  Danya Lynne is a 47 y.o. male. history of HTN , HLP, CAD/MI in 2008. S/p PCI LAD in 2008 and additional stent in LAD in 2011. He also has residual 70% RV branch stenosis. Admitted to [facility] with AF and RVR on 7/15 nuclear stress test at that time with no ischemia, apical attenuation ( sub endo mi) and EF 54%. Echo also on 7/15: EF 55-60% no rwma,  bicuspid AV and mild AS  Admitted to [facility] on 7/15 for recurrent AF. DOLORES showed no NEWTON, normal LV and tri leaflet AV with possible partial fusion of 2 leaflets, mild AS, AI and MR . He failed cardioversion with persistent AF   successful dc cardioversion on 7/15 while on multaq  Recurrent AF afterwards on 7/15  and eventually AF ablation with Dr. Lucia Otero on 9/15  Stopped toprol on his own accord on 11/15 on account of excessive sob  He has GB disease and stones 2015  Echo on 8/16:Left ventricle: Size was normal. Systolic function was normal by visual  assessment. Ejection fraction was estimated to be 60 %. There were no  regional wall motion abnormalities. Wall thickness was mildly increased. Aortic valve: There was an apparent echogenic mass of tissue appearing in  the outflow tract adherent to the right coronary cusp; unknown if  clinically significant. Leaflets exhibited moderately increased thickness,  moderate calcification, and mildly reduced cuspal separation. Transaortic  velocity was increased due to valvular stenosis. There was mild stenosis. VIRA was 2.0 cm2, peak/mean gradients were 29/17 mmHg, and the DI was 0.45. There was mild regurgitation. Aorta, systemic arteries: The root exhibited normal size; however, the  ascending aorta was mildly enlarged; sinus of valsalva was 3.8 cm, the  ST-J was 3.5 cm, and the ascending aorta was 4.0 cm.   ER visit for cp on 5/17 normal w/u but elevated BP  NUCLEAR STRESS TEST on 6/2/17 no gross ischemia fixed apical and infero apical defect EF 60%   ECHO on 5/17: EF 60% mild AS mean gradient of 14 mmhg mild ID                Past Medical History    Diagnosis  Date      ZAK (obstructive sleep apnea)  3/31/2010      Type II or unspecified type diabetes mellitus without mention of complication, not stated as uncontrolled ([facility])  3/31/2007      HTN (hypertension), benign  3/31/2010      Mixed hyperlipidemia  3/31/2010      GERD (gastroesophageal reflux disease)  3/31/2010      MI (myocardial infarction) ([facility])  2/26/2007          stent x2, 3rd stent ~5yrs later, Plavix      Obese  3/31/2010      Hypercholesterolemia         Arthritis         Headache(784.0)         Low serum testosterone level  07/2013      A-fib ([facility])  7/9/2015       [address]         Past Surgical History    Procedure  Laterality  Date      Hx orthopaedic     2007          L Knee Arthroscopy      Pr cardiac surg procedure unlist                4 stents placed 0802,4823      Hx angioplasty            Hx afib ablation              History                      Social History      Marital Status:            Spouse Name:  N/A          Number of Children:  N/A      Years of Education:  N/A                Occupational History      Not on file.                      Social History Main Topics      Smoking status:  Never Smoker       Smokeless tobacco:  Never Used      Alcohol Use:  No      Drug Use:  No      Sexual Activity:             Partners:  Female          Birth Control/ Protection:  None                   Other Topics  Concern      Not on file        Social History Narrative         HPI    ROS    Physical Exam  Lab Results   Component Value Date/Time    Cholesterol, total 208 04/24/2017 08:34 AM    HDL Cholesterol 49 04/24/2017 08:34 AM    LDL,Direct 80 07/10/2015 04:44 AM    LDL, calculated 95 04/24/2017 08:34 AM    VLDL, calculated 64 04/24/2017 08:34 AM    Triglyceride 321 04/24/2017 08:34 AM    CHOL/HDL Ratio 5.6 07/10/2015 04:44 AM       ASSESSMENT and PLAN  PAF: he remains in NSR after AF ablation on 9/15. dronedarone stopped on 1/1/16. Previous holter with no AF and NSR. His [id number] VASC score is 3 with 3.2 % cva risk. Continue asa and plavix. Now off eliquis. He will alert me right away if any palpitations fatigue sob and off course chest pain. Once again He tells me that when he was in NSR he felt absolutely great therefore he is very symptomatic when in AF    CAD: Reviewed records from ER of 5/17 Visit for cp , w/u negative. Chest burning resolved and he thinks it is gi in origin. His nuclear stress on 6/17 failed to reveal any ischemia, fixed apical defect discussed and potential explanations also discussed. Continue medical rx only unless of recurrent symptoms    HTN:  controlled mostly but at times >140. norvasc in combination with cardizem was causing too much le edema, now off norvasc and tolerating bystolic well , need to be aggressive with modifications of risk factors need SBP well controlled at all times. Increase bystolic to 10 mg side effects explained he will let me know if any and if bp not controlled  HLD: not optimal ,start crestor 40 mg instead of lipitor, he tells me Ok Sang is too expensive attempting tricor he understands potential harmful interactions and side effects, lipids and cmp and cpk in 6-8 weeks  AS: mild some fusion of 2 cusps. Continue echo surveillance in 1 year  Obesity: discussed importance of weight loss and exercise !!   See him back in 10/17

## 2017-06-09 NOTE — PATIENT INSTRUCTIONS
Stop Lipitor    Start Crestor 40 mg at bedtime     Start Tricor 148 mg daily    Increase Bystolic 10 mg at bedtime    Have blood work drawn in 8 weeks (Fasting)    Keep October appointment

## 2017-06-09 NOTE — MR AVS SNAPSHOT
Visit Information Date & Time Provider Department Dept. Phone Encounter #  
 6/9/2017  9:00 AM Daniele Beltran MD [facility] Ronda Sauceda [phone] [id number] Follow-up Instructions Return in about 3 months (around 9/9/2017). Follow-up and Disposition History Your Appointments 6/28/2017 10:00 AM  
ESTABLISHED PATIENT with Nina Gonzalez MD  
[facility] ([address] [place] [address] Road) 320 [facility] 600 [facility] [address]  
[phone]  
  
   
 [address] [address]  
  
    
 9/18/2017 10:20 AM  
ESTABLISHED PATIENT with Daniele Beltran MD  
[facility] ([address]) Appt Note: 6 month follow up  
 [address] 200 [facility] [address]  
[address] [facility] [address]  
  
    
 10/17/2017  9:45 AM  
ROUTINE CARE with Chase Ponce MD  
[facility] [address] [place] [address] Appt Note: 6 month [address] [facility] [address]  
[phone]  
  
   
 [address] [facility] [address] Upcoming Health Maintenance Date Due FOBT Q 1 YEAR AGE 50-75 4/4/2016 FOOT EXAM Q1 7/25/2017 INFLUENZA AGE 9 TO ADULT 8/1/2017 HEMOGLOBIN A1C Q6M 10/24/2017 MICROALBUMIN Q1 4/24/2018 LIPID PANEL Q1 4/24/2018 EYE EXAM RETINAL OR DILATED Q1 5/25/2018 Pneumococcal 19-64 Highest Risk (3 of 3 - PPSV23) 9/1/2021 DTaP/Tdap/Td series (3 - Td) 12/14/2026 Allergies as of 6/9/2017  Review Complete On: 6/9/2017 By: Daniele Beltran MD  
  
 Severity Noted Reaction Type Reactions Cleocin [Clindamycin Hcl]  11/29/2011    Rash Current Immunizations  Reviewed on 12/14/2016 Name Date Influenza Vaccine 10/1/2016, 9/12/2015, 9/28/2014,  Deferred (Patient Refused) Pneumococcal Polysaccharide (PPSV-23) 9/1/2016 Pneumococcal Vaccine (Pcv) 3/10/2009 TDAP Vaccine 9/16/2008 Tdap 12/14/2016 Not reviewed this visit You Were Diagnosed With   
  
 Codes Comments Atrial fibrillation, unspecified type ([facility]    -  Primary ICD-10-CM: I48.91 
ICD-9-CM: 427.31   
 HTN (hypertension), benign     ICD-10-CM: I10 
ICD-9-CM: 401.1 Mixed hyperlipidemia     ICD-10-CM: E78.2 ICD-9-CM: 272.2 Atherosclerosis of native coronary artery of native heart without angina pectoris     ICD-10-CM: I25.10 ICD-9-CM: 414.01 Aortic valve stenosis, unspecified etiology     ICD-10-CM: I35.0 ICD-9-CM: 424.1 Morbid obesity, unspecified obesity type     ICD-10-CM: E66.01 
ICD-9-CM: 278.01 Gastroesophageal reflux disease without esophagitis     ICD-10-CM: K21.9 ICD-9-CM: 530.81 Vitals BP Pulse Resp Height(growth percentile) Weight(growth percentile) SpO2  
 138/80 (BP 1 Location: Left arm, BP Patient Position: Sitting) 76 12 5' 10\" (1.778 m) 266 lb 3.2 oz (120.7 kg) 96% BMI Smoking Status 38.2 kg/m2 Never Smoker Vitals History BMI and BSA Data Body Mass Index Body Surface Area  
 38.2 kg/m 2 2.44 m 2 Preferred Pharmacy Pharmacy Name Phone [facility] [address] [place] [address] Your Updated Medication List  
  
   
This list is accurate as of: 6/9/17  9:56 AM.  Always use your most recent med list.  
  
  
  
  
 CARTIA  mg ER capsule Generic drug:  dilTIAZem CD Take 240 mg by mouth daily. clopidogrel 75 mg Tab Commonly known as:  PLAVIX TAKE ONE TABLET BY MOUTH ONCE DAILY CRESTOR 40 mg tablet Generic drug:  rosuvastatin Take 40 mg by mouth nightly. fosinopril 40 mg tablet Commonly known as:  MONOPRIL  
TAKE ONE TABLET BY MOUTH ONCE DAILY  
  
 glipiZIDE SR 10 mg CR tablet Commonly known as:  GLUCOTROL XL Take 10 mg by mouth daily. hydroCHLOROthiazide 25 mg tablet Commonly known as:  HYDRODIURIL  
TAKE ONE TABLET BY MOUTH ONCE DAILY JARDIANCE 25 mg tablet Generic drug:  empagliflozin Take 25 mg by mouth daily. metFORMIN 1,000 mg tablet Commonly known as:  GLUCOPHAGE  
TAKE ONE TABLET BY MOUTH TWICE DAILY WITH  MEALS  
  
 miSOPROStol 200 mcg tablet Commonly known as:  CYTOTEC  
TAKE ONE TABLET BY MOUTH TWICE DAILY  
  
 multivitamin tablet Commonly known as:  ONE A DAY Take 1 Tab by mouth daily. nebivolol 10 mg tablet Commonly known as:  BYSTOLIC Take 10 mg by mouth nightly. ONETOUCH ULTRA TEST strip Generic drug:  glucose blood VI test strips ONETOUCH ULTRA2 monitoring kit Generic drug:  Blood-Glucose Meter  
  
 pantoprazole 40 mg tablet Commonly known as:  PROTONIX Take 1 Tab by mouth daily. SITagliptin 100 mg tablet Commonly known as:  Clora Yong Take 1 Tab by mouth daily. TESTOSTERONE IM  
by IntraMUSCular route. Every 10 weeks. Aveed TRICOR 145 mg tablet Generic drug:  fenofibrate nanocrystallized Take 145 mg by mouth daily. We Performed the Following CK [id number] CPT(R)] LIPID PANEL [42389 CPT(R)] METABOLIC PANEL, COMPREHENSIVE [36420 CPT(R)] Follow-up Instructions Return in about 3 months (around 9/9/2017). Patient Instructions Stop Lipitor Start Crestor 40 mg at bedtime Start Tricor 148 mg daily Increase Bystolic 10 mg at bedtime Have blood work drawn in 8 weeks (Fasting) Keep October appointment [facility] SERVICES! Dear Dave Dao: Thank you for requesting a kingsky account. Our records indicate that you already have an active kingsky account. You can access your account anytime at [email or web address] Did you know that you can access your hospital and ER discharge instructions at any time in kingsky? You can also review all of your test results from your hospital stay or ER visit. Additional Information If you have questions, please visit the Frequently Asked Questions section of the [employer] website at [email or web address] Remember, [employer] is NOT to be used for urgent needs. For medical emergencies, dial 911. Now available from your iPhone and Android! Please provide this summary of care documentation to your next provider. Your primary care clinician is listed as Suad Lynn. If you have any questions after today's visit, please call [phone].

## 2017-06-28 ENCOUNTER — OFFICE VISIT (OUTPATIENT)
Dept: CARDIOLOGY CLINIC | Age: 52
End: 2017-06-28

## 2017-06-28 VITALS
WEIGHT: 266.2 LBS | HEART RATE: 76 BPM | RESPIRATION RATE: 16 BRPM | SYSTOLIC BLOOD PRESSURE: 122 MMHG | OXYGEN SATURATION: 96 % | HEIGHT: 70 IN | DIASTOLIC BLOOD PRESSURE: 78 MMHG | BODY MASS INDEX: 38.11 KG/M2

## 2017-06-28 DIAGNOSIS — I48.91 ATRIAL FIBRILLATION BY ELECTROCARDIOGRAM (HCC): Primary | ICD-10-CM

## 2017-06-28 NOTE — PROGRESS NOTES
HISTORY OF PRESENTING ILLNESS      Warden Summers is a 49 y.o. male established patient with native CAD, PCI, recurrent highly symptomatic atrial fibrillation status post AF ablation who is now off CHADSVASC score of 3. He is currently no longer on Eliquis. he denies recurrence of symptomatic atrial fibrillation. He was recently noted to be hypertensive; his drug therapy was adjusted and he is now normotensive.      ACTIVE PROBLEM LIST     Patient Active Problem List    Diagnosis Date Noted    S/P ablation of atrial fibrillation 09/29/2015     Priority: 1 - One    Gastroparesis 03/23/2016    Cholelithiasis without cholecystitis 10/03/2015    Acute kidney failure ([address] 10/03/2015    Chest pain 10/03/2015    Cellulitis, neck 10/02/2015    ED (erectile dysfunction) 08/28/2015    Hypogonadism in male 08/28/2015    A-fib ([facility] 07/09/2015    Chest pain, unspecified 01/23/2013    Depression 11/29/2011    Colon polyp 10/18/2011    HTN (hypertension), benign 03/31/2010    Mixed hyperlipidemia 03/31/2010    Coronary Atherosclerosis of Native Coronary Artery- restenosis and restenting 6/2011-Dr. Tirado 03/31/2010    GERD (gastroesophageal reflux disease) 03/31/2010    Obese 03/31/2010    ZAK (obstructive sleep apnea) 03/31/2010    MI (myocardial infarction) ([address] 02/26/2008    Type II diabetes mellitus ([address] 03/31/2007           PAST MEDICAL HISTORY     Past Medical History:   Diagnosis Date    A-fib ([facility] 7/9/2015    Multaq, Eliquis    Arthritis     GERD (gastroesophageal reflux disease) 3/31/2010    Headache     HTN (hypertension), benign 3/31/2010    Hypercholesterolemia     Low serum testosterone level 07/2013    MI (myocardial infarction) ([address] 2/26/2007    stent x2, 3rd stent ~5yrs later, Plavix    Mixed hyperlipidemia 3/31/2010    Obese 3/31/2010    ZAK (obstructive sleep apnea) 3/31/2010    Type II or unspecified type diabetes mellitus without mention of complication, not stated as uncontrolled 3/31/2007           PAST SURGICAL HISTORY     Past Surgical History:   Procedure Laterality Date    CARDIAC SURG PROCEDURE UNLIST      4 stents placed 2009,2011    HX AFIB ABLATION      HX ANGIOPLASTY      HX ORTHOPAEDIC  2007    L Knee Arthroscopy          ALLERGIES     Allergies   Allergen Reactions    Cleocin [Clindamycin Hcl] Rash          FAMILY HISTORY     Family History   Problem Relation Age of Onset    Arthritis-osteo Mother     Diabetes Mother     Elevated Lipids Mother     Heart Disease Mother     Hypertension Mother     Elevated Lipids Father     Heart Disease Father     Hypertension Father     Cancer Father      skin    Hypertension Brother     Diabetes Brother     Hypertension Brother     Diabetes Brother     Hypertension Brother     negative for cardiac disease       SOCIAL HISTORY     Social History     Social History    Marital status:      Spouse name: N/A    Number of children: N/A    Years of education: N/A     Social History Main Topics    Smoking status: Never Smoker    Smokeless tobacco: Never Used    Alcohol use No    Drug use: No    Sexual activity: Yes     Partners: Female     Birth control/ protection: None     Other Topics Concern    Not on file     Social History Narrative         MEDICATIONS     Current Outpatient Prescriptions   Medication Sig    fenofibrate nanocrystallized (TRICOR) 145 mg tablet Take 1 Tab by mouth daily.  nebivolol (BYSTOLIC) 10 mg tablet Take 1 Tab by mouth nightly.  rosuvastatin (CRESTOR) 40 mg tablet Take 1 Tab by mouth nightly.  dilTIAZem CD (CARTIA XT) 240 mg ER capsule Take 240 mg by mouth daily.     hydroCHLOROthiazide (HYDRODIURIL) 25 mg tablet TAKE ONE TABLET BY MOUTH ONCE DAILY    clopidogrel (PLAVIX) 75 mg tab TAKE ONE TABLET BY MOUTH ONCE DAILY    miSOPROStol (CYTOTEC) 200 mcg tablet TAKE ONE TABLET BY MOUTH TWICE DAILY    fosinopril (MONOPRIL) 40 mg tablet TAKE ONE TABLET BY MOUTH ONCE DAILY    JARDIANCE 25 mg tablet Take 25 mg by mouth daily.  ONETOUCH ULTRA2 monitoring kit     glipiZIDE SR (GLUCOTROL) 10 mg CR tablet Take 10 mg by mouth daily.  pantoprazole (PROTONIX) 40 mg tablet Take 1 Tab by mouth daily.  ONETOUCH ULTRA TEST strip     sitaGLIPtin (JANUVIA) 100 mg tablet Take 1 Tab by mouth daily.  metFORMIN (GLUCOPHAGE) 1,000 mg tablet TAKE ONE TABLET BY MOUTH TWICE DAILY WITH  MEALS    TESTOSTERONE IM by IntraMUSCular route. Every 10 weeks. Aveed    multivitamin (ONE A DAY) tablet Take 1 Tab by mouth daily. No current facility-administered medications for this visit. I have reviewed the nurses notes, vitals, problem list, allergy list, medical history, family, social history and medications. REVIEW OF SYMPTOMS      General: Pt denies excessive weight gain or loss. Pt is able to conduct ADL's  HEENT: Denies blurred vision, headaches, hearing loss, epistaxis and difficulty swallowing. Respiratory: Denies cough, congestion, shortness of breath, SCHAFFER, wheezing or stridor. Cardiovascular: Denies precordial pain, palpitations, edema or PND  Gastrointestinal: Denies poor appetite, indigestion, abdominal pain or blood in stool  Genitourinary: Denies hematuria, dysuria, increased urinary frequency  Musculoskeletal: Denies joint pain or swelling from muscles or joints  Neurologic: Denies tremor, paresthesias, headache, or sensory motor disturbance  Psychiatric: Denies confusion, insomnia, depression  Integumentray: Denies rash, itching or ulcers. Hematologic: Denies easy bruising, bleeding     PHYSICAL EXAMINATION      There were no vitals filed for this visit. General: Well developed, in no acute distress. HEENT: No jaundice, oral mucosa moist, no oral ulcers  Neck: Supple, no stiffness, no lymphadenopathy, supple  Heart:  Normal S1/S2 negative S3 or S4.  Regular, no murmur, gallop or rub, no jugular venous distention  Respiratory: Clear bilaterally x 4, no wheezing or rales  Abdomen:   Soft, non-tender, bowel sounds are active.   Extremities:  No edema, normal cap refill, no cyanosis. Musculoskeletal: No clubbing, no deformities  Neuro: A&Ox3, speech clear, gait stable, cooperative, no focal neurologic deficits  Skin: Skin color is normal. No rashes or lesions. Non diaphoretic, moist.  Vascular: 2+ pulses symmetric in all extremities       DIAGNOSTIC DATA      EKG:        LABORATORY DATA      Lab Results   Component Value Date/Time    WBC 8.6 05/08/2017 10:48 PM    Hemoglobin (POC) 14.6 01/24/2010 02:15 AM    HGB 15.6 05/08/2017 10:48 PM    Hematocrit (POC) 43 01/24/2010 02:15 AM    HCT 44.9 05/08/2017 10:48 PM    PLATELET 779 17/54/2254 10:48 PM    MCV 85.9 05/08/2017 10:48 PM      Lab Results   Component Value Date/Time    Sodium 137 05/08/2017 10:48 PM    Potassium 3.9 05/08/2017 10:48 PM    Chloride 97 05/08/2017 10:48 PM    CO2 31 05/08/2017 10:48 PM    Anion gap 9 05/08/2017 10:48 PM    Glucose 247 05/08/2017 10:48 PM    BUN 24 05/08/2017 10:48 PM    Creatinine 1.31 05/08/2017 10:48 PM    BUN/Creatinine ratio 18 05/08/2017 10:48 PM    GFR est AA >60 05/08/2017 10:48 PM    GFR est non-AA 58 05/08/2017 10:48 PM    Calcium 9.5 05/08/2017 10:48 PM    Bilirubin, total 0.6 05/08/2017 10:48 PM    AST (SGOT) 16 05/08/2017 10:48 PM    Alk. phosphatase 83 05/08/2017 10:48 PM    Protein, total 8.4 05/08/2017 10:48 PM    Albumin 3.9 05/08/2017 10:48 PM    Globulin 4.5 05/08/2017 10:48 PM    A-G Ratio 0.9 05/08/2017 10:48 PM    ALT (SGPT) 38 05/08/2017 10:48 PM           ASSESSMENT      1. Atrial fibrillation              A. Symptomatic              B. Persistent              C. Failed multaq              D. AF ablation 9/29/15  2. CAD  3. Myocardial infarction  4. ZAK on CPAP  5. Hypertension  6. Hyperlipidemia  7. Diabetes  8. Obesity  9. GERD  10. Percutaneous transluminal angioplasty  11. Cholelithiasis       PLAN     Patient is doing well from an AF standpoint. Continue current regimen however restart Eliquis 5 mg twice daily for CVA risk reduction. Discussed options of left atrial appendage occlusion. FOLLOW-UP     1 year      Thank you,  Lilo Hudson MD and Dr. Adelina Clark for involving me in the care of this extraordinarily pleasant male. Please do not hesitate to contact me for further questions/concerns.          Genoveva Fermin MD  Cardiac Electrophysiology / Cardiology    [place] [address].  [address] [place] [address], [address]  [phone] / [phone] Fax   [phone] / [phone] Fax

## 2017-07-24 DIAGNOSIS — I10 HTN (HYPERTENSION), BENIGN: ICD-10-CM

## 2017-07-24 RX ORDER — FOSINOPRIL SODIUM 40 MG/1
TABLET ORAL
Qty: 90 TAB | Refills: 2 | Status: SHIPPED | OUTPATIENT
Start: 2017-07-24 | End: 2017-07-26 | Stop reason: SDUPTHER

## 2017-07-26 RX ORDER — FOSINOPRIL SODIUM 40 MG/1
TABLET ORAL
Qty: 90 TAB | Refills: 2 | Status: SHIPPED | OUTPATIENT
Start: 2017-07-26 | End: 2018-08-27 | Stop reason: SDUPTHER

## 2017-09-16 LAB
ALBUMIN SERPL-MCNC: 4.4 G/DL (ref 3.5–5.5)
ALBUMIN/GLOB SERPL: 1.5 {RATIO} (ref 1.2–2.2)
ALP SERPL-CCNC: 39 IU/L (ref 39–117)
ALT SERPL-CCNC: 33 IU/L (ref 0–44)
AST SERPL-CCNC: 29 IU/L (ref 0–40)
BILIRUB SERPL-MCNC: 0.4 MG/DL (ref 0–1.2)
BUN SERPL-MCNC: 19 MG/DL (ref 6–24)
BUN/CREAT SERPL: 14 (ref 9–20)
CALCIUM SERPL-MCNC: 9.4 MG/DL (ref 8.7–10.2)
CHLORIDE SERPL-SCNC: 102 MMOL/L (ref 96–106)
CHOLEST SERPL-MCNC: 151 MG/DL (ref 100–199)
CK SERPL-CCNC: 121 U/L (ref 24–204)
CO2 SERPL-SCNC: 26 MMOL/L (ref 18–29)
CREAT SERPL-MCNC: 1.38 MG/DL (ref 0.76–1.27)
GLOBULIN SER CALC-MCNC: 2.9 G/DL (ref 1.5–4.5)
GLUCOSE SERPL-MCNC: 151 MG/DL (ref 65–99)
HDLC SERPL-MCNC: 39 MG/DL
INTERPRETATION, 910389: NORMAL
INTERPRETATION: NORMAL
LDLC SERPL CALC-MCNC: 58 MG/DL (ref 0–99)
PDF IMAGE, 910387: NORMAL
POTASSIUM SERPL-SCNC: 4.2 MMOL/L (ref 3.5–5.2)
PROT SERPL-MCNC: 7.3 G/DL (ref 6–8.5)
SODIUM SERPL-SCNC: 145 MMOL/L (ref 134–144)
TRIGL SERPL-MCNC: 271 MG/DL (ref 0–149)
VLDLC SERPL CALC-MCNC: 54 MG/DL (ref 5–40)

## 2017-10-18 ENCOUNTER — OFFICE VISIT (OUTPATIENT)
Dept: INTERNAL MEDICINE CLINIC | Age: 52
End: 2017-10-18

## 2017-10-18 VITALS
DIASTOLIC BLOOD PRESSURE: 83 MMHG | WEIGHT: 263.38 LBS | OXYGEN SATURATION: 98 % | RESPIRATION RATE: 18 BRPM | HEART RATE: 78 BPM | TEMPERATURE: 98 F | BODY MASS INDEX: 37.71 KG/M2 | HEIGHT: 70 IN | SYSTOLIC BLOOD PRESSURE: 139 MMHG

## 2017-10-18 DIAGNOSIS — I10 HTN (HYPERTENSION), BENIGN: Primary | ICD-10-CM

## 2017-10-18 NOTE — MR AVS SNAPSHOT
Visit Information Date & Time Provider Department Dept. Phone Encounter #  
 10/18/2017  9:30 AM Mily Bethea MD [facility] [address]  [id number] Your Appointments 6/29/2018 10:20 AM  
ESTABLISHED PATIENT with Elvis Post MD  
[facility] ([address]) Appt Note: annual visit [address] [facility] [address] [address] [address] Upcoming Health Maintenance Date Due  
 FOOT EXAM Q1 7/25/2017 INFLUENZA AGE 9 TO ADULT 8/1/2017 HEMOGLOBIN A1C Q6M 10/24/2017 MICROALBUMIN Q1 4/24/2018 EYE EXAM RETINAL OR DILATED Q1 5/25/2018 LIPID PANEL Q1 9/15/2018 Pneumococcal 19-64 Highest Risk (3 of 3 - PPSV23) 9/1/2021 COLONOSCOPY 10/11/2026 DTaP/Tdap/Td series (3 - Td) 12/14/2026 Allergies as of 10/18/2017  Review Complete On: 10/18/2017 By: Remy Lopez LPN Severity Noted Reaction Type Reactions Cleocin [Clindamycin Hcl]  11/29/2011    Rash Current Immunizations  Reviewed on 12/14/2016 Name Date Influenza Vaccine 10/1/2016, 9/12/2015, 9/28/2014,  Deferred (Patient Refused) Pneumococcal Polysaccharide (PPSV-23) 9/1/2016 Pneumococcal Vaccine (Pcv) 3/10/2009 TDAP Vaccine 9/16/2008 Tdap 12/14/2016 Not reviewed this visit You Were Diagnosed With   
  
 Codes Comments HTN (hypertension), benign    -  Primary ICD-10-CM: I10 
ICD-9-CM: 401.1 Vitals BP Pulse Temp Resp Height(growth percentile) Weight(growth percentile) 139/83 (BP 1 Location: Left arm, BP Patient Position: Sitting) 78 98 °F (36.7 °C) (Oral) 18 5' 10\" (1.778 m) 263 lb 6 oz (119.5 kg) SpO2 BMI Smoking Status 98% 37.79 kg/m2 Never Smoker BMI and BSA Data Body Mass Index Body Surface Area  
 37.79 kg/m 2 2.43 m 2 Preferred Pharmacy Pharmacy Name Phone [facility] [address] [facility], [address] [place] [address] Your Updated Medication List  
  
   
This list is accurate as of: 10/18/17 10:10 AM.  Always use your most recent med list.  
  
  
  
  
 apixaban 5 mg tablet Commonly known as:  Sherlean Hyde Take 1 Tab by mouth two (2) times a day. BYSTOLIC 10 mg tablet Generic drug:  nebivolol TAKE ONE TABLET BY MOUTH NIGHTLY CARTIA  mg ER capsule Generic drug:  dilTIAZem CD Take 240 mg by mouth daily. clopidogrel 75 mg Tab Commonly known as:  PLAVIX TAKE ONE TABLET BY MOUTH ONCE DAILY  
  
 fenofibrate nanocrystallized 145 mg tablet Commonly known as:  TRICOR  
TAKE ONE TABLET BY MOUTH DAILY fosinopril 40 mg tablet Commonly known as:  MONOPRIL  
TAKE ONE TABLET BY MOUTH ONCE DAILY  
  
 glipiZIDE SR 10 mg CR tablet Commonly known as:  GLUCOTROL XL Take 10 mg by mouth daily. hydroCHLOROthiazide 25 mg tablet Commonly known as:  HYDRODIURIL  
TAKE ONE TABLET BY MOUTH ONCE DAILY JARDIANCE 25 mg tablet Generic drug:  empagliflozin Take 25 mg by mouth daily. metFORMIN 1,000 mg tablet Commonly known as:  GLUCOPHAGE  
TAKE ONE TABLET BY MOUTH TWICE DAILY WITH  MEALS  
  
 miSOPROStol 200 mcg tablet Commonly known as:  CYTOTEC  
TAKE ONE TABLET BY MOUTH TWICE DAILY  
  
 multivitamin tablet Commonly known as:  ONE A DAY Take 1 Tab by mouth daily. ONETOUCH ULTRA TEST strip Generic drug:  glucose blood VI test strips ONETOUCH ULTRA2 monitoring kit Generic drug:  Blood-Glucose Meter  
  
 pantoprazole 40 mg tablet Commonly known as:  PROTONIX Take 1 Tab by mouth daily. rosuvastatin 40 mg tablet Commonly known as:  CRESTOR  
TAKE ONE TABLET BY MOUTH NIGHTLY SITagliptin 100 mg tablet Commonly known as:  [place] Take 1 Tab by mouth daily. TESTOSTERONE IM  
by IntraMUSCular route. Every 10 weeks. Aveed Introducing [facility]! Dear Ailin Gaston: Thank you for requesting a [employer] account. Our records indicate that you already have an active [employer] account. You can access your account anytime at [email or web address] Did you know that you can access your hospital and ER discharge instructions at any time in [employer]? You can also review all of your test results from your hospital stay or ER visit. Additional Information If you have questions, please visit the Frequently Asked Questions section of the [employer] website at [email or web address] Remember, [employer] is NOT to be used for urgent needs. For medical emergencies, dial 911. Now available from your iPhone and Android! Please provide this summary of care documentation to your next provider. Your primary care clinician is listed as Phyllis Hill. If you have any questions after today's visit, please call [phone].

## 2017-10-18 NOTE — PROGRESS NOTES
HISTORY OF PRESENT ILLNESS  Janey Martinez is a 47 y.o. male. HPI  Hypertension:  Janey Martinez is a 47 y.o. male with hypertension. with Chronic kidney disease stage 3   Medication change since last visit: No  The patient reports:  taking medications as instructed, no medication side effects noted, home BP monitoring in range of 359'O systolic over 03'F diastolic, no chest pain on exertion, no dyspnea on exertion, no swelling of ankles, no orthostatic dizziness or lightheadedness, no palpitations. Lifestyle modification/social history: not attempting to follow a low fat, low cholesterol diet, exercises sporadically, nonsmoker    Lab Results   Component Value Date/Time    Sodium 145 09/15/2017 08:56 AM    Potassium 4.2 09/15/2017 08:56 AM    Chloride 102 09/15/2017 08:56 AM    CO2 26 09/15/2017 08:56 AM    Anion gap 9 05/08/2017 10:48 PM    Glucose 151 09/15/2017 08:56 AM    BUN 19 09/15/2017 08:56 AM    Creatinine 1.38 09/15/2017 08:56 AM    BUN/Creatinine ratio 14 09/15/2017 08:56 AM    GFR est AA 68 09/15/2017 08:56 AM    GFR est non-AA 59 09/15/2017 08:56 AM    Calcium 9.4 09/15/2017 08:56 AM       DM - followed by endocrine  Last A1c - ?    atrial fibrillation, CAD followed in cardiology by Dr. Alpa Larsen and Dr. Atul Reeves. Considering procedure for treatment of atrial fibrillation. Anticoagulated. ROS    Physical Exam   Constitutional: He appears well-developed and well-nourished. No distress. /83 (BP 1 Location: Left arm, BP Patient Position: Sitting)  Pulse 78  Temp 98 °F (36.7 °C) (Oral)   Resp 18  Ht 5' 10\" (1.778 m)  Wt 263 lb 6 oz (119.5 kg)  SpO2 98%  BMI 37.79 kg/m2Body mass index is 37.79 kg/(m^2). HENT:   Mouth/Throat: Oropharynx is clear and moist.   Neck: No JVD present. Carotid bruit is not present. Cardiovascular: Normal rate, regular rhythm, normal heart sounds and intact distal pulses.     Pulmonary/Chest: Effort normal and breath sounds normal.   Musculoskeletal: He exhibits no edema. Neurological: He is alert. Skin: Skin is warm and dry. He is not diaphoretic. Nursing note and vitals reviewed. ASSESSMENT and PLAN  Diagnoses and all orders for this visit:    1. HTN (hypertension), benign- Well controlled and stable. his medications were reviewed and refilled where necessary as noted below. Labs ordered as noted. Follow-up Disposition:  Return in about 6 months (around 4/18/2018).

## 2017-10-20 ENCOUNTER — TELEPHONE (OUTPATIENT)
Dept: CARDIOLOGY CLINIC | Age: 52
End: 2017-10-20

## 2017-10-20 NOTE — TELEPHONE ENCOUNTER
Verified patient with two patient identifiers. Spoke with patient regarding his lab results. He said that he will discuss it further with Dennis Nguyen about it along with Eliquis and danii on next office visit.

## 2018-02-14 LAB — HBA1C MFR BLD HPLC: 8.8 %

## 2018-02-19 RX ORDER — MISOPROSTOL 200 UG/1
TABLET ORAL
Qty: 60 TAB | Refills: 5 | Status: SHIPPED | OUTPATIENT
Start: 2018-02-19 | End: 2019-09-06

## 2018-04-03 ENCOUNTER — TELEPHONE (OUTPATIENT)
Dept: INTERNAL MEDICINE CLINIC | Age: 53
End: 2018-04-03

## 2018-04-03 NOTE — TELEPHONE ENCOUNTER
I called pt back, no answer. LM on VM to call back to reschedule appt. Pt needs to follow up with PCP.

## 2018-04-03 NOTE — TELEPHONE ENCOUNTER
Patient states he's returning a call to [facility]. States there was a msge about his apt for tmrw.  He can be reached at [phone]

## 2018-04-18 ENCOUNTER — OFFICE VISIT (OUTPATIENT)
Dept: INTERNAL MEDICINE CLINIC | Age: 53
End: 2018-04-18

## 2018-04-18 VITALS
HEART RATE: 71 BPM | HEIGHT: 70 IN | RESPIRATION RATE: 18 BRPM | SYSTOLIC BLOOD PRESSURE: 137 MMHG | TEMPERATURE: 98.7 F | DIASTOLIC BLOOD PRESSURE: 87 MMHG | OXYGEN SATURATION: 97 %

## 2018-04-18 DIAGNOSIS — N18.30 CKD (CHRONIC KIDNEY DISEASE) STAGE 3, GFR 30-59 ML/MIN (HCC): ICD-10-CM

## 2018-04-18 DIAGNOSIS — F51.01 PRIMARY INSOMNIA: ICD-10-CM

## 2018-04-18 DIAGNOSIS — I10 HTN (HYPERTENSION), BENIGN: Primary | ICD-10-CM

## 2018-04-18 PROBLEM — E66.01 SEVERE OBESITY (BMI 35.0-39.9) WITH COMORBIDITY (HCC): Status: ACTIVE | Noted: 2018-04-18

## 2018-04-18 PROBLEM — E11.21 TYPE 2 DIABETES WITH NEPHROPATHY (HCC): Status: ACTIVE | Noted: 2018-04-18

## 2018-04-18 RX ORDER — CHOLECALCIFEROL (VITAMIN D3) 125 MCG
5 CAPSULE ORAL
COMMUNITY
Start: 2018-04-18 | End: 2019-09-06

## 2018-04-18 RX ORDER — INSULIN DEGLUDEC INJECTION 100 U/ML
16 INJECTION, SOLUTION SUBCUTANEOUS DAILY
COMMUNITY
Start: 2018-03-28 | End: 2019-09-06

## 2018-04-18 NOTE — MR AVS SNAPSHOT
[address] [facility] [address] 
 
 
 [address] [facility] [place] [address] 
[phone] Patient: Ward Morse MRN: [id number] HLM:[id number]/[id number] Visit Information Date & Time Provider Department Dept. Phone Encounter #  
 4/18/2018  9:30 AM Vikki Rodriguez MD [facility] [address]  [id number] Your Appointments 6/29/2018 10:20 AM  
ESTABLISHED PATIENT with Trent Gosselin, MD  
[facility] Appt Note: annual visit [address] [facility] [address]  
54 [facility] [address] [address] Upcoming Health Maintenance Date Due  
 FOOT EXAM Q1 7/25/2017 MICROALBUMIN Q1 4/24/2018 EYE EXAM RETINAL OR DILATED Q1 5/25/2018 HEMOGLOBIN A1C Q6M 8/14/2018 LIPID PANEL Q1 9/15/2018 COLONOSCOPY 10/11/2026 DTaP/Tdap/Td series (3 - Td) 12/14/2026 Allergies as of 4/18/2018  Review Complete On: 10/18/2017 By: Reji Haynes LPN Severity Noted Reaction Type Reactions Cleocin [Clindamycin Hcl]  11/29/2011    Rash Current Immunizations  Reviewed on 12/14/2016 Name Date Influenza Vaccine 10/22/2017, 10/1/2016, 9/12/2015, 9/28/2014,  Deferred (Patient Refused) Pneumococcal Polysaccharide (PPSV-23) 9/1/2016 Pneumococcal Vaccine (Pcv) 3/10/2009 TDAP Vaccine 9/16/2008 Tdap 12/14/2016 Not reviewed this visit You Were Diagnosed With   
  
 Codes Comments HTN (hypertension), benign    -  Primary ICD-10-CM: I10 
ICD-9-CM: 401.1 CKD (chronic kidney disease) stage 3, GFR 30-59 ml/min     ICD-10-CM: N18.3 ICD-9-CM: 585.3 Primary insomnia     ICD-10-CM: F51.01 
ICD-9-CM: 307.42 Vitals BP Pulse Temp Resp Height(growth percentile) Weight(growth percentile)  137/87 (BP 1 Location: Left arm, BP Patient Position: Sitting) 71 98.7 °F (37.1 °C) (Oral) 18 5' 10\" (1.778 m) (P) 264 lb 8 oz (120 kg) SpO2 BMI Smoking Status 97% (P) 37.95 kg/m2 Never Smoker Vitals History BMI and BSA Data Body Mass Index Body Surface Area (P) 37.95 kg/m 2 (P) 2.43 m 2 Preferred Pharmacy Pharmacy Name Phone [facility] [address] [phone] Your Updated Medication List  
  
   
This list is accurate as of 4/18/18 10:08 AM.  Always use your most recent med list.  
  
  
  
  
 apixaban 5 mg tablet Commonly known as:  Arbutus Bon Take 1 Tab by mouth two (2) times a day. BYSTOLIC 10 mg tablet Generic drug:  nebivolol TAKE ONE TABLET BY MOUTH NIGHTLY CARTIA  mg ER capsule Generic drug:  dilTIAZem CD  
TAKE ONE CAPSULE BY MOUTH ONCE DAILY  
  
 clopidogrel 75 mg Tab Commonly known as:  PLAVIX TAKE ONE TABLET BY MOUTH ONCE DAILY  
  
 fenofibrate nanocrystallized 145 mg tablet Commonly known as:  TRICOR  
TAKE ONE TABLET BY MOUTH DAILY fosinopril 40 mg tablet Commonly known as:  MONOPRIL  
TAKE ONE TABLET BY MOUTH ONCE DAILY  
  
 glipiZIDE SR 10 mg CR tablet Commonly known as:  GLUCOTROL XL Take 10 mg by mouth daily. hydroCHLOROthiazide 25 mg tablet Commonly known as:  HYDRODIURIL  
TAKE ONE TABLET BY MOUTH ONCE DAILY JARDIANCE 25 mg tablet Generic drug:  empagliflozin Take 25 mg by mouth daily. melatonin Tab tablet Take 1 Tab by mouth nightly. metFORMIN 1,000 mg tablet Commonly known as:  GLUCOPHAGE  
TAKE ONE TABLET BY MOUTH TWICE DAILY WITH  MEALS  
  
 miSOPROStol 200 mcg tablet Commonly known as:  CYTOTEC  
TAKE ONE TABLET BY MOUTH TWICE DAILY  
  
 multivitamin tablet Commonly known as:  ONE A DAY Take 1 Tab by mouth daily. ONETOUCH ULTRA TEST strip Generic drug:  glucose blood VI test strips ONETOUCH ULTRA2 monitoring kit Generic drug:  Blood-Glucose Meter pantoprazole 40 mg tablet Commonly known as:  PROTONIX Take 1 Tab by mouth daily. rosuvastatin 40 mg tablet Commonly known as:  CRESTOR  
TAKE ONE TABLET BY MOUTH NIGHTLY SITagliptin 100 mg tablet Commonly known as:  Birdena Broom Take 1 Tab by mouth daily. TESTOSTERONE IM  
by IntraMUSCular route. Every 10 weeks. Aveed TRESIBA FLEXTOUCH U-100 100 unit/mL (3 mL) Inpn Generic drug:  insulin degludec  
8 Units by SubCUTAneous route daily. We Performed the Following METABOLIC PANEL, BASIC [80170 CPT(R)] URINALYSIS W/ RFLX MICROSCOPIC [67251 CPT(R)] Introducing [facility] SERVICES! Dear Josselin Caraballo: Thank you for requesting a [employer] account. Our records indicate that you already have an active [employer] account. You can access your account anytime at [email or web address] "[employer]"/[email or web address] Did you know that you can access your hospital and ER discharge instructions at any time in [employer]? You can also review all of your test results from your hospital stay or ER visit. Additional Information If you have questions, please visit the Frequently Asked Questions section of the [employer] website at [email or web address] "[employer]"/[email or web address]/. Remember, [employer] is NOT to be used for urgent needs. For medical emergencies, dial 911. Now available from your iPhone and Android! Please provide this summary of care documentation to your next provider. Your primary care clinician is listed as Wanda Terrell. If you have any questions after today's visit, please call [phone].

## 2018-04-18 NOTE — PROGRESS NOTES
HISTORY OF PRESENT ILLNESS  Rito Gentile is a 47 y.o. male. HPI   Hypertension:  Rito Gentile is a 47 y.o. male with hypertension. with Chronic kidney disease stage 3   Medication change since last visit: No  The patient reports:  taking medications as instructed, no medication side effects noted, home BP monitoring in range of 016'T systolic over 68'W diastolic, no TIA's, no chest pain on exertion, no dyspnea on exertion, no swelling of ankles, no orthostatic dizziness or lightheadedness, no palpitations. Lifestyle modification/social history: generally follows a low fat low cholesterol diet, generally follows a low sodium diet    Lab Results   Component Value Date/Time    Sodium 145 (H) 09/15/2017 08:56 AM    Potassium 4.2 09/15/2017 08:56 AM    Chloride 102 09/15/2017 08:56 AM    CO2 26 09/15/2017 08:56 AM    Anion gap 9 05/08/2017 10:48 PM    Glucose 151 (H) 09/15/2017 08:56 AM    BUN 19 09/15/2017 08:56 AM    Creatinine 1.38 (H) 09/15/2017 08:56 AM    BUN/Creatinine ratio 14 09/15/2017 08:56 AM    GFR est AA 68 09/15/2017 08:56 AM    GFR est non-AA 59 (L) 09/15/2017 08:56 AM    Calcium 9.4 09/15/2017 08:56 AM           ROS    Physical Exam   Constitutional: He appears well-developed and well-nourished. No distress. /87 (BP 1 Location: Left arm, BP Patient Position: Sitting)  Pulse 71  Temp 98.7 °F (37.1 °C) (Oral)   Resp 18  Ht 5' 10\" (1.778 m)  Wt (P) 264 lb 8 oz (120 kg)  SpO2 97%  BMI (P) 37.95 kg/m2Body mass index is 37.95 kg/(m^2) (pended). HENT:   Mouth/Throat: Oropharynx is clear and moist.   Neck: No JVD present. Carotid bruit is not present. Cardiovascular: Normal rate, regular rhythm and intact distal pulses. Murmur heard. Systolic murmur is present with a grade of 2/6   Pulses:       Posterior tibial pulses are 1+ on the right side, and 1+ on the left side. Pulmonary/Chest: Effort normal and breath sounds normal.   Musculoskeletal: He exhibits no edema. Neurological: He is alert. Skin: Skin is warm and dry. He is not diaphoretic. Nursing note and vitals reviewed. ASSESSMENT and PLAN  Diagnoses and all orders for this visit:    1. HTN (hypertension), benign -Well controlled and stable. his medications were reviewed and refilled where necessary as noted below. Labs ordered as noted. -     METABOLIC PANEL, BASIC    2. CKD (chronic kidney disease) stage 3, GFR 30-59 ml/min - recheck   -     METABOLIC PANEL, BASIC  -     URINALYSIS W/ RFLX MICROSCOPIC    3. Primary insomnia - he reports some difficulties with initiating and maintaining sleep. Can try otc melatonin for now.     obstructive sleep apnea has been well controlled with PAP      Follow-up Disposition: Not on File

## 2018-04-19 LAB
APPEARANCE UR: CLEAR
BILIRUB UR QL STRIP: NEGATIVE
BUN SERPL-MCNC: 35 MG/DL (ref 6–24)
BUN/CREAT SERPL: 22 (ref 9–20)
CALCIUM SERPL-MCNC: 10 MG/DL (ref 8.7–10.2)
CHLORIDE SERPL-SCNC: 100 MMOL/L (ref 96–106)
CO2 SERPL-SCNC: 23 MMOL/L (ref 18–29)
COLOR UR: YELLOW
CREAT SERPL-MCNC: 1.58 MG/DL (ref 0.76–1.27)
GFR SERPLBLD CREATININE-BSD FMLA CKD-EPI: 50 ML/MIN/1.73
GFR SERPLBLD CREATININE-BSD FMLA CKD-EPI: 57 ML/MIN/1.73
GLUCOSE SERPL-MCNC: 113 MG/DL (ref 65–99)
GLUCOSE UR QL: ABNORMAL
HGB UR QL STRIP: NEGATIVE
INTERPRETATION: NORMAL
KETONES UR QL STRIP: NEGATIVE
LEUKOCYTE ESTERASE UR QL STRIP: NEGATIVE
MICRO URNS: ABNORMAL
NITRITE UR QL STRIP: NEGATIVE
PH UR STRIP: 5 [PH] (ref 5–7.5)
POTASSIUM SERPL-SCNC: 4.7 MMOL/L (ref 3.5–5.2)
PROT UR QL STRIP: NEGATIVE
SODIUM SERPL-SCNC: 141 MMOL/L (ref 134–144)
SP GR UR: 1.03 (ref 1–1.03)
UROBILINOGEN UR STRIP-MCNC: 0.2 MG/DL (ref 0.2–1)

## 2018-05-15 LAB — HBA1C MFR BLD HPLC: 7 %

## 2018-07-11 ENCOUNTER — TELEPHONE (OUTPATIENT)
Dept: CARDIOLOGY CLINIC | Age: 53
End: 2018-07-11

## 2018-07-11 NOTE — TELEPHONE ENCOUNTER
Received office note from [facility]. After Dr Becky Tran reviewed note he states that he needs to see patient in follow up as we have not seen him since 6/9/2017. Tried to reach patient by both numbers but no answer and the cell is not correct. My chart message sent for patient to contact us for an appointment.

## 2018-09-24 ENCOUNTER — OFFICE VISIT (OUTPATIENT)
Dept: CARDIOLOGY CLINIC | Age: 53
End: 2018-09-24

## 2018-09-24 VITALS
SYSTOLIC BLOOD PRESSURE: 138 MMHG | OXYGEN SATURATION: 95 % | DIASTOLIC BLOOD PRESSURE: 72 MMHG | BODY MASS INDEX: 39.2 KG/M2 | WEIGHT: 273.8 LBS | RESPIRATION RATE: 16 BRPM | HEIGHT: 70 IN | HEART RATE: 76 BPM

## 2018-09-24 DIAGNOSIS — I48.19 PERSISTENT ATRIAL FIBRILLATION (HCC): Primary | ICD-10-CM

## 2018-09-24 NOTE — PROGRESS NOTES
Visit Vitals    /72 (BP 1 Location: Left arm, BP Patient Position: Sitting)    Pulse 76    Resp 16    Ht 5' 10\" (1.778 m)    Wt 273 lb 12.8 oz (124.2 kg)    SpO2 95%    BMI 39.29 kg/m2

## 2018-09-24 NOTE — MR AVS SNAPSHOT
[address] 
[phone] Patient: Anuradha Valerio MRN: [id number] OBK:0[birth year] Visit Information Date & Time Provider Department Dept. Phone Encounter #  
 9/24/2018  9:20 AM Herminio Alvarado MD [facility] [phone] [id number] Upcoming Health Maintenance Date Due  
 FOOT EXAM Q1 7/25/2017 EYE EXAM RETINAL OR DILATED Q1 5/25/2018 Influenza Age 5 to Adult 8/1/2018 LIPID PANEL Q1 9/15/2018 HEMOGLOBIN A1C Q6M 11/16/2018 MICROALBUMIN Q1 2/15/2019 DTaP/Tdap/Td series (3 - Td) 12/14/2026 COLONOSCOPY 7/13/2028 Allergies as of 9/24/2018  Review Complete On: 9/24/2018 By: Herminio Alvarado MD  
  
 Severity Noted Reaction Type Reactions Cleocin [Clindamycin Hcl]  11/29/2011    Rash Current Immunizations  Reviewed on 12/14/2016 Name Date Influenza Vaccine 10/22/2017, 10/1/2016, 9/12/2015, 9/28/2014,  Deferred (Patient Refused) Pneumococcal Polysaccharide (PPSV-23) 9/1/2016 Pneumococcal Vaccine (Pcv) 3/10/2009 TDAP Vaccine 9/16/2008 Tdap 12/14/2016 Not reviewed this visit You Were Diagnosed With   
  
 Codes Comments Persistent atrial fibrillation (HCC)    -  Primary ICD-10-CM: I48.1 ICD-9-CM: 427.31 Vitals BP Pulse Resp Height(growth percentile) Weight(growth percentile) SpO2  
 138/72 (BP 1 Location: Left arm, BP Patient Position: Sitting) 76 16 5' 10\" (1.778 m) 273 lb 12.8 oz (124.2 kg) 95% BMI Smoking Status 39.29 kg/m2 Never Smoker Vitals History BMI and BSA Data Body Mass Index Body Surface Area  
 39.29 kg/m 2 2.48 m 2 Preferred Pharmacy Pharmacy Name Phone [address] [phone] Your Updated Medication List  
  
   
This list is accurate as of 9/24/18  9:36 AM.  Always use your most recent med list.  
  
 apixaban 5 mg tablet Commonly known as:  Cameron Gault Take 1 Tab by mouth two (2) times a day. BYSTOLIC 10 mg tablet Generic drug:  nebivolol TAKE ONE TABLET BY MOUTH NIGHTLY CARTIA  mg ER capsule Generic drug:  dilTIAZem CD  
TAKE ONE CAPSULE BY MOUTH ONCE DAILY  
  
 clopidogrel 75 mg Tab Commonly known as:  PLAVIX TAKE ONE TABLET BY MOUTH ONCE DAILY  
  
 fenofibrate nanocrystallized 145 mg tablet Commonly known as:  TRICOR  
TAKE ONE TABLET BY MOUTH ONCE DAILY fosinopril 40 mg tablet Commonly known as:  MONOPRIL  
TAKE ONE TABLET BY MOUTH ONCE DAILY  
  
 glipiZIDE SR 10 mg CR tablet Commonly known as:  GLUCOTROL XL Take 10 mg by mouth two (2) times a day. hydroCHLOROthiazide 25 mg tablet Commonly known as:  HYDRODIURIL  
TAKE ONE TABLET BY MOUTH ONCE DAILY JARDIANCE 25 mg tablet Generic drug:  empagliflozin Take 25 mg by mouth daily. melatonin Tab tablet Take 1 Tab by mouth nightly. metFORMIN 1,000 mg tablet Commonly known as:  GLUCOPHAGE  
TAKE ONE TABLET BY MOUTH TWICE DAILY WITH  MEALS  
  
 miSOPROStol 200 mcg tablet Commonly known as:  CYTOTEC  
TAKE ONE TABLET BY MOUTH TWICE DAILY  
  
 multivitamin tablet Commonly known as:  ONE A DAY Take 1 Tab by mouth daily. ONETOUCH ULTRA TEST strip Generic drug:  glucose blood VI test strips ONETOUCH ULTRA2 monitoring kit Generic drug:  Blood-Glucose Meter  
  
 pantoprazole 40 mg tablet Commonly known as:  PROTONIX Take 1 Tab by mouth daily. rosuvastatin 40 mg tablet Commonly known as:  CRESTOR  
TAKE ONE TABLET BY MOUTH NIGHTLY SITagliptin 100 mg tablet Commonly known as:  [facility] Take 1 Tab by mouth daily. TESTOSTERONE IM  
by IntraMUSCular route. Every 10 weeks. Aveed TRESIBA FLEXTOUCH U-100 100 unit/mL (3 mL) Inpn Generic drug:  insulin degludec  
16 Units by SubCUTAneous route daily. We Performed the Following AMB POC EKG ROUTINE W/ 12 LEADS, INTER & REP [11361 CPT(R)] Introducing [facility] SERVICES! Dear Kash Burton: Thank you for requesting a [employer] account. Our records indicate that you already have an active [employer] account. You can access your account anytime at [email or web address] Did you know that you can access your hospital and ER discharge instructions at any time in [employer] You can also review all of your test results from your hospital stay or ER visit. Additional Information If you have questions, please visit the Frequently Asked Questions section of the [employer] website at [email or web address] Remember, [employer] is NOT to be used for urgent needs. For medical emergencies, dial 911. Now available from your iPhone and Android! Please provide this summary of care documentation to your next provider. Your primary care clinician is listed as Vin Valera. If you have any questions after today's visit, please call [phone].

## 2018-09-24 NOTE — PROGRESS NOTES
HISTORY OF PRESENTING ILLNESS      Charla Weinberg is a 47 y.o. male with native CAD, PCI, recurrent highly symptomatic atrial fibrillation status post AF ablation who is now off CHADSVASC score of 3. He is currently no longer on Eliquis. he denies recurrence of symptomatic atrial fibrillation. He was recently noted to be hypertensive; his drug therapy was adjusted and he is now normotensive. Last visit, we re-started Eliquis and discussed LAAO. He has been tolerating anticoagulation. Denies symptomatic recurrence of AF thus far. ACTIVE PROBLEM LIST     Patient Active Problem List    Diagnosis Date Noted    Type 2 diabetes with nephropathy ([facility] 04/18/2018    Severe obesity (BMI 35.0-39. 9) [facility] comorbidity [facility] 04/18/2018    CKD (chronic kidney disease) stage 3, GFR 30-59 ml/min 04/18/2018    Gastroparesis 03/23/2016    Cholelithiasis without cholecystitis 10/03/2015    Chest pain 10/03/2015    Cellulitis, neck 10/02/2015    S/P ablation of atrial fibrillation 09/29/2015    ED (erectile dysfunction) 08/28/2015    Hypogonadism in male 08/28/2015    [facility] 07/09/2015    Chest pain, unspecified 01/23/2013    Depression 11/29/2011    Colon polyp 10/18/2011    HTN (hypertension), benign 03/31/2010    Mixed hyperlipidemia 03/31/2010    Coronary Atherosclerosis of Native Coronary Artery- restenosis and restenting 6/2011-Dr. Tirado 03/31/2010    GERD (gastroesophageal reflux disease) 03/31/2010    Obese 03/31/2010    ZAK (obstructive sleep apnea) 03/31/2010    MI (myocardial infarction) ([facility] 02/26/2008    Type II diabetes mellitus ([facility] 03/31/2007           PAST MEDICAL HISTORY     Past Medical History:   Diagnosis Date    [facility] 7/9/2015    Multaq, Eliquis    Arthritis     GERD (gastroesophageal reflux disease) 3/31/2010    SFKMDKJP(025.2)     HTN (hypertension), benign 3/31/2010    Hypercholesterolemia     Low serum testosterone level 07/2013    MI (myocardial infarction) ([facility] 2/26/2007    stent x2, 3rd stent ~5yrs later, Plavix    Mixed hyperlipidemia 3/31/2010    Obese 3/31/2010    ZAK (obstructive sleep apnea) 3/31/2010    Type II or unspecified type diabetes mellitus without mention of complication, not stated as uncontrolled 3/31/2007           PAST SURGICAL HISTORY     Past Surgical History:   Procedure Laterality Date    CARDIAC SURG PROCEDURE UNLIST      4 stents placed 2009,2011    HX AFIB ABLATION      HX ANGIOPLASTY      HX ORTHOPAEDIC  2007    L Knee Arthroscopy          ALLERGIES     Allergies   Allergen Reactions    Cleocin [Clindamycin Hcl] Rash          FAMILY HISTORY     Family History   Problem Relation Age of Onset    Arthritis-osteo Mother     Diabetes Mother     Elevated Lipids Mother     Heart Disease Mother     Hypertension Mother     Elevated Lipids Father     Heart Disease Father     Hypertension Father     Cancer Father      skin    Hypertension Brother     Diabetes Brother     Hypertension Brother     Diabetes Brother     Hypertension Brother     negative for cardiac disease       SOCIAL HISTORY     Social History     Social History    Marital status:      Spouse name: N/A    Number of children: N/A    Years of education: N/A     Social History Main Topics    Smoking status: Never Smoker    Smokeless tobacco: Never Used    Alcohol use No    Drug use: No    Sexual activity: Yes     Partners: Female     Birth control/ protection: None     Other Topics Concern    Not on file     Social History Narrative         MEDICATIONS     Current Outpatient Prescriptions   Medication Sig    fosinopril (MONOPRIL) 40 mg tablet TAKE ONE TABLET BY MOUTH ONCE DAILY    BYSTOLIC 10 mg tablet TAKE ONE TABLET BY MOUTH NIGHTLY    fenofibrate nanocrystallized (TRICOR) 145 mg tablet TAKE ONE TABLET BY MOUTH ONCE DAILY    rosuvastatin (CRESTOR) 40 mg tablet TAKE ONE TABLET BY MOUTH NIGHTLY    TRESIBA FLEXTOUCH U-100 100 unit/mL (3 mL) inpn 8 Units by SubCUTAneous route daily.  melatonin tab tablet Take 1 Tab by mouth nightly.  CARTIA  mg ER capsule TAKE ONE CAPSULE BY MOUTH ONCE DAILY    miSOPROStol (CYTOTEC) 200 mcg tablet TAKE ONE TABLET BY MOUTH TWICE DAILY    clopidogrel (PLAVIX) 75 mg tab TAKE ONE TABLET BY MOUTH ONCE DAILY    hydroCHLOROthiazide (HYDRODIURIL) 25 mg tablet TAKE ONE TABLET BY MOUTH ONCE DAILY    apixaban (ELIQUIS) 5 mg tablet Take 1 Tab by mouth two (2) times a day.  JARDIANCE 25 mg tablet Take 25 mg by mouth daily.  ONETOUCH ULTRA2 monitoring kit     glipiZIDE SR (GLUCOTROL) 10 mg CR tablet Take 10 mg by mouth daily.  pantoprazole (PROTONIX) 40 mg tablet Take 1 Tab by mouth daily.  ONETOUCH ULTRA TEST strip     sitaGLIPtin (JANUVIA) 100 mg tablet Take 1 Tab by mouth daily.  metFORMIN (GLUCOPHAGE) 1,000 mg tablet TAKE ONE TABLET BY MOUTH TWICE DAILY WITH  MEALS    TESTOSTERONE IM by IntraMUSCular route. Every 10 weeks. Aveed    multivitamin (ONE A DAY) tablet Take 1 Tab by mouth daily. No current facility-administered medications for this visit. I have reviewed the nurses notes, vitals, problem list, allergy list, medical history, family, social history and medications. REVIEW OF SYMPTOMS      General: Pt denies excessive weight gain or loss. Pt is able to conduct ADL's  HEENT: Denies blurred vision, headaches, hearing loss, epistaxis and difficulty swallowing. Respiratory: Denies cough, congestion, shortness of breath, SCHAFFER, wheezing or stridor.   Cardiovascular: Denies precordial pain, palpitations, edema or PND  Gastrointestinal: Denies poor appetite, indigestion, abdominal pain or blood in stool  Genitourinary: Denies hematuria, dysuria, increased urinary frequency  Musculoskeletal: Denies joint pain or swelling from muscles or joints  Neurologic: Denies tremor, paresthesias, headache, or sensory motor disturbance  Psychiatric: Denies confusion, insomnia, depression  Integumentray: Denies rash, itching or ulcers. Hematologic: Denies easy bruising, bleeding       PHYSICAL EXAMINATION      There were no vitals filed for this visit. General: Well developed, in no acute distress. HEENT: No jaundice, oral mucosa moist, no oral ulcers  Neck: Supple, no stiffness, no lymphadenopathy, supple  Heart:  Normal S1/S2 negative S3 or S4. Regular, no murmur, gallop or rub, no jugular venous distention  Respiratory: Clear bilaterally x 4, no wheezing or rales  Abdomen:   Soft, non-tender, bowel sounds are active.   Extremities:  No edema, normal cap refill, no cyanosis. Musculoskeletal: No clubbing, no deformities  Neuro: A&Ox3, speech clear, gait stable, cooperative, no focal neurologic deficits  Skin: Skin color is normal. No rashes or lesions. Non diaphoretic, moist.  Vascular: 2+ pulses symmetric in all extremities       DIAGNOSTIC DATA      EKG: Sinus rhythm       LABORATORY DATA      Lab Results   Component Value Date/Time    WBC 8.6 05/08/2017 10:48 PM    Hemoglobin (POC) 14.6 01/24/2010 02:15 AM    HGB 15.6 05/08/2017 10:48 PM    Hematocrit (POC) 43 01/24/2010 02:15 AM    HCT 44.9 05/08/2017 10:48 PM    PLATELET 533 69/37/4460 10:48 PM    MCV 85.9 05/08/2017 10:48 PM      Lab Results   Component Value Date/Time    Sodium 141 04/18/2018 10:40 AM    Potassium 4.7 04/18/2018 10:40 AM    Chloride 100 04/18/2018 10:40 AM    CO2 23 04/18/2018 10:40 AM    Anion gap 9 05/08/2017 10:48 PM    Glucose 113 (H) 04/18/2018 10:40 AM    BUN 35 (H) 04/18/2018 10:40 AM    Creatinine 1.58 (H) 04/18/2018 10:40 AM    BUN/Creatinine ratio 22 (H) 04/18/2018 10:40 AM    GFR est AA 57 (L) 04/18/2018 10:40 AM    GFR est non-AA 50 (L) 04/18/2018 10:40 AM    Calcium 10.0 04/18/2018 10:40 AM    Bilirubin, total 0.4 09/15/2017 08:56 AM    AST (SGOT) 29 09/15/2017 08:56 AM    Alk.  phosphatase 39 09/15/2017 08:56 AM    Protein, total 7.3 09/15/2017 08:56 AM    Albumin 4.4 09/15/2017 08:56 AM Globulin 4.5 (H) 05/08/2017 10:48 PM    A-G Ratio 1.5 09/15/2017 08:56 AM    ALT (SGPT) 33 09/15/2017 08:56 AM           ASSESSMENT      1. Atrial fibrillation              A. Symptomatic              B. Persistent              C. Failed multaq              D. AF ablation 9/29/15  2. CAD  3. Myocardial infarction  4. ZAK on CPAP  5. Hypertension  6. Hyperlipidemia  7. Diabetes  8. Obesity  9. GERD  10. Percutaneous transluminal angioplasty  11. Cholelithiasis     PLAN     Continue monitoring clinically. Continue anticoagulation       FOLLOW-UP     PRN      Thank you, Carlene Santo MD and Dr. Becky Tran for allowing me to participate in the care of this extraordinarily pleasant male. Please do not hesitate to contact me for further questions/concerns.          Buck Rainey MD  Cardiac Electrophysiology / Cardiology    [facility].  [address]    [address]  [phone] / [phone] Fax   [phone] / [phone] Fax

## 2018-10-31 DIAGNOSIS — I10 HTN (HYPERTENSION), BENIGN: ICD-10-CM

## 2018-11-01 RX ORDER — FOSINOPRIL SODIUM 40 MG/1
TABLET ORAL
Qty: 30 TAB | Refills: 0 | Status: SHIPPED | OUTPATIENT
Start: 2018-11-01 | End: 2018-12-01 | Stop reason: SDUPTHER

## 2018-11-01 RX ORDER — CLOPIDOGREL BISULFATE 75 MG/1
TABLET ORAL
Qty: 30 TAB | Refills: 0 | Status: SHIPPED | OUTPATIENT
Start: 2018-11-01 | End: 2018-11-25 | Stop reason: SDUPTHER

## 2018-11-12 DIAGNOSIS — I10 HTN (HYPERTENSION), BENIGN: ICD-10-CM

## 2018-11-13 RX ORDER — HYDROCHLOROTHIAZIDE 25 MG/1
TABLET ORAL
Qty: 90 TAB | Refills: 2 | Status: SHIPPED | OUTPATIENT
Start: 2018-11-13 | End: 2019-08-29 | Stop reason: SDUPTHER

## 2018-11-25 DIAGNOSIS — I48.91 ATRIAL FIBRILLATION, UNSPECIFIED TYPE (HCC): ICD-10-CM

## 2018-11-25 DIAGNOSIS — I25.10 ATHEROSCLEROSIS OF NATIVE CORONARY ARTERY OF NATIVE HEART WITHOUT ANGINA PECTORIS: ICD-10-CM

## 2018-11-25 DIAGNOSIS — E66.01 MORBID OBESITY, UNSPECIFIED OBESITY TYPE (HCC): ICD-10-CM

## 2018-11-25 DIAGNOSIS — I35.0 AORTIC VALVE STENOSIS, UNSPECIFIED ETIOLOGY: ICD-10-CM

## 2018-11-25 DIAGNOSIS — I10 HTN (HYPERTENSION), BENIGN: ICD-10-CM

## 2018-11-25 DIAGNOSIS — K21.9 GASTROESOPHAGEAL REFLUX DISEASE WITHOUT ESOPHAGITIS: ICD-10-CM

## 2018-11-25 DIAGNOSIS — E78.2 MIXED HYPERLIPIDEMIA: ICD-10-CM

## 2018-11-26 RX ORDER — CLOPIDOGREL BISULFATE 75 MG/1
TABLET ORAL
Qty: 30 TAB | Refills: 0 | Status: SHIPPED | OUTPATIENT
Start: 2018-11-26 | End: 2018-12-20 | Stop reason: SDUPTHER

## 2018-11-26 RX ORDER — ROSUVASTATIN CALCIUM 40 MG/1
TABLET, COATED ORAL
Qty: 30 TAB | Refills: 0 | Status: SHIPPED | OUTPATIENT
Start: 2018-11-26 | End: 2018-12-20 | Stop reason: SDUPTHER

## 2018-11-26 RX ORDER — NEBIVOLOL HYDROCHLORIDE 10 MG/1
TABLET ORAL
Qty: 30 TAB | Refills: 0 | Status: SHIPPED | OUTPATIENT
Start: 2018-11-26 | End: 2018-12-20 | Stop reason: SDUPTHER

## 2018-11-26 RX ORDER — FENOFIBRATE 145 MG/1
TABLET, COATED ORAL
Qty: 30 TAB | Refills: 0 | Status: SHIPPED | OUTPATIENT
Start: 2018-11-26 | End: 2018-12-20 | Stop reason: SDUPTHER

## 2018-12-01 DIAGNOSIS — I10 HTN (HYPERTENSION), BENIGN: ICD-10-CM

## 2018-12-03 RX ORDER — FOSINOPRIL SODIUM 40 MG/1
TABLET ORAL
Qty: 30 TAB | Refills: 0 | Status: SHIPPED | OUTPATIENT
Start: 2018-12-03 | End: 2018-12-20 | Stop reason: SDUPTHER

## 2018-12-20 DIAGNOSIS — I10 HTN (HYPERTENSION), BENIGN: ICD-10-CM

## 2018-12-20 DIAGNOSIS — I48.91 ATRIAL FIBRILLATION, UNSPECIFIED TYPE (HCC): ICD-10-CM

## 2018-12-20 DIAGNOSIS — I35.0 AORTIC VALVE STENOSIS, UNSPECIFIED ETIOLOGY: ICD-10-CM

## 2018-12-20 DIAGNOSIS — E78.2 MIXED HYPERLIPIDEMIA: ICD-10-CM

## 2018-12-20 DIAGNOSIS — E66.01 MORBID OBESITY, UNSPECIFIED OBESITY TYPE (HCC): ICD-10-CM

## 2018-12-20 DIAGNOSIS — K21.9 GASTROESOPHAGEAL REFLUX DISEASE WITHOUT ESOPHAGITIS: ICD-10-CM

## 2018-12-20 DIAGNOSIS — I25.10 ATHEROSCLEROSIS OF NATIVE CORONARY ARTERY OF NATIVE HEART WITHOUT ANGINA PECTORIS: ICD-10-CM

## 2018-12-21 RX ORDER — ROSUVASTATIN CALCIUM 40 MG/1
TABLET, COATED ORAL
Qty: 30 TAB | Refills: 0 | Status: SHIPPED | OUTPATIENT
Start: 2018-12-21 | End: 2019-01-23 | Stop reason: SDUPTHER

## 2018-12-21 RX ORDER — CLOPIDOGREL BISULFATE 75 MG/1
TABLET ORAL
Qty: 30 TAB | Refills: 0 | Status: SHIPPED | OUTPATIENT
Start: 2018-12-21 | End: 2019-02-12 | Stop reason: SDUPTHER

## 2018-12-21 RX ORDER — FOSINOPRIL SODIUM 40 MG/1
TABLET ORAL
Qty: 30 TAB | Refills: 0 | Status: SHIPPED | OUTPATIENT
Start: 2018-12-21 | End: 2019-02-12 | Stop reason: SDUPTHER

## 2018-12-21 RX ORDER — NEBIVOLOL HYDROCHLORIDE 10 MG/1
TABLET ORAL
Qty: 30 TAB | Refills: 0 | Status: SHIPPED | OUTPATIENT
Start: 2018-12-21 | End: 2019-01-23 | Stop reason: SDUPTHER

## 2018-12-21 RX ORDER — FENOFIBRATE 145 MG/1
TABLET, COATED ORAL
Qty: 30 TAB | Refills: 0 | Status: SHIPPED | OUTPATIENT
Start: 2018-12-21 | End: 2019-01-23 | Stop reason: SDUPTHER

## 2019-01-22 ENCOUNTER — OFFICE VISIT (OUTPATIENT)
Dept: CARDIOLOGY CLINIC | Age: 54
End: 2019-01-22

## 2019-01-22 VITALS
RESPIRATION RATE: 18 BRPM | DIASTOLIC BLOOD PRESSURE: 76 MMHG | WEIGHT: 274.6 LBS | OXYGEN SATURATION: 97 % | HEIGHT: 70 IN | BODY MASS INDEX: 39.31 KG/M2 | SYSTOLIC BLOOD PRESSURE: 120 MMHG | HEART RATE: 77 BPM

## 2019-01-22 DIAGNOSIS — I48.19 PERSISTENT ATRIAL FIBRILLATION (HCC): ICD-10-CM

## 2019-01-22 DIAGNOSIS — E78.2 MIXED HYPERLIPIDEMIA: Primary | ICD-10-CM

## 2019-01-22 DIAGNOSIS — E11.9 TYPE 2 DIABETES MELLITUS WITHOUT COMPLICATION, WITHOUT LONG-TERM CURRENT USE OF INSULIN (HCC): ICD-10-CM

## 2019-01-22 NOTE — PROGRESS NOTES
1. Have you been to the ER, urgent care clinic since your last visit? Hospitalized since your last visit? No    2. Have you seen or consulted any other health care providers outside of the [address] [place] [address] since your last visit? Include any pap smears or colon screening. No    3) Do you have an Advance Directive on file? no    Patient is accompanied by self I have received verbal consent from Zaynab Williamson to discuss any/all medical information while they are present in the room.   Visit Vitals  /76 (BP 1 Location: Left arm, BP Patient Position: Sitting)   Pulse 77   Resp 18   Ht 5' 10\" (1.778 m)   Wt 274 lb 9.6 oz (124.6 kg)   SpO2 97%   BMI 39.40 kg/m²

## 2019-01-22 NOTE — PROGRESS NOTES
HISTORY OF PRESENT ILLNESS  Iraj Messer is a 47 y.o. male. history of HTN , HLP, CAD/MI in 2008. S/p PCI LAD in 2008 and additional stent in LAD in 2011. He also has residual 70% RV branch stenosis. Admitted to [facility] with AF and RVR on 7/15 nuclear stress test at that time with no ischemia, apical attenuation ( sub endo mi) and EF 54%. Echo also on 7/15: EF 55-60% no rwma,  bicuspid AV and mild AS  Admitted to [facility] on 7/15 for recurrent AF. DOLORES showed no NEWTON, normal LV and tri leaflet AV with possible partial fusion of 2 leaflets, mild AS, AI and MR . He failed cardioversion with persistent AF   successful dc cardioversion on 7/15 while on multaq  Recurrent AF afterwards on 7/15  and eventually AF ablation with Dr. Michael Dawson on 9/15  Stopped toprol on his own accord on 11/15 on account of excessive sob  He has GB disease and stones 2015  Echo on 8/16:Left ventricle: Size was normal. Systolic function was normal by visual  assessment. Ejection fraction was estimated to be 60 %. There were no  regional wall motion abnormalities. Wall thickness was mildly increased. Aortic valve: There was an apparent echogenic mass of tissue appearing in  the outflow tract adherent to the right coronary cusp; unknown if  clinically significant. Leaflets exhibited moderately increased thickness,  moderate calcification, and mildly reduced cuspal separation. Transaortic  velocity was increased due to valvular stenosis. There was mild stenosis. VIRA was 2.0 cm2, peak/mean gradients were 29/17 mmHg, and the DI was 0.45. There was mild regurgitation. Aorta, systemic arteries: The root exhibited normal size; however, the  ascending aorta was mildly enlarged; sinus of valsalva was 3.8 cm, the  ST-J was 3.5 cm, and the ascending aorta was 4.0 cm.   ER visit for cp on 5/17 normal w/u but elevated BP  NUCLEAR STRESS TEST on 6/2/17 no gross ischemia fixed apical and infero apical defect EF 60%   ECHO on 5/17: EF 60% mild AS mean gradient of 14 mmhg mild RI                Past Medical History    Diagnosis  Date      ZAK (obstructive sleep apnea)  3/31/2010      Type II or unspecified type diabetes mellitus without mention of complication, not stated as uncontrolled ([facility])  3/31/2007      HTN (hypertension), benign  3/31/2010      Mixed hyperlipidemia  3/31/2010      GERD (gastroesophageal reflux disease)  3/31/2010      MI (myocardial infarction) ([facility])  2/26/2007          stent x2, 3rd stent ~5yrs later, Plavix      Obese  3/31/2010      Hypercholesterolemia         Arthritis         Headache(784.0)         Low serum testosterone level  07/2013      A-fib ([facility])  7/9/2015       [address]            Past Surgical History    Procedure  Laterality  Date      Hx orthopaedic     2007          L Knee Arthroscopy      Pr cardiac surg procedure unlist                4 stents placed 9308,6519      Hx angioplasty            Hx afib ablation              History                      Social History      Marital Status:            Spouse Name:  N/A          Number of Children:  N/A      Years of Education:  N/A                Occupational History      Not on file.                      Social History Main Topics      Smoking status:  Never Smoker       Smokeless tobacco:  Never Used      Alcohol Use:  No      Drug Use:  No      Sexual Activity:             Partners:  Female          Birth Control/ Protection:  None                   Other Topics  Concern      Not on file        Social History Narrative         HPI  He denies any cp or sob he works active duty as  and no issues   he had one episode of palpitations rates at 150 last december while sitting  Abated after extra bystolic  Review of Systems   Respiratory: Negative. Cardiovascular: Negative.       Visit Vitals  /76 (BP 1 Location: Left arm, BP Patient Position: Sitting)   Pulse 77   Resp 18   Ht 5' 10\" (1.778 m)   Wt 124.6 kg (274 lb 9.6 oz)   SpO2 97%   BMI 39.40 kg/m²       Physical Exam   Neck: No JVD present. Carotid bruit is not present. Cardiovascular: Normal rate and regular rhythm. Pulmonary/Chest: Effort normal and breath sounds normal.   Abdominal: Soft. Musculoskeletal: He exhibits no edema. Psychiatric: He has a normal mood and affect. Current Outpatient Medications on File Prior to Visit   Medication Sig Dispense Refill    rosuvastatin (CRESTOR) 40 mg tablet  TAKE ONE TABLET BY MOUTH NIGHTLY **NEEDS AN APPOINTMENT 11/26/18** 30 Tab 0    fenofibrate nanocrystallized (TRICOR) 145 mg tablet TAKE 1 TABLET BY MOUTH ONCE DAILY **NEEDS  AN  APPOINTMENT  11/26/18** 30 Tab 0    clopidogrel (PLAVIX) 75 mg tab TAKE 1 TABLET BY MOUTH ONCE DAILY **NEEDS  AN  APPOINTMENT  11/26/18** 30 Tab 0    BYSTOLIC 10 mg tablet  TAKE ONE TABLET BY MOUTH ONCE NIGHTLY **NEEDS AN APPOINTMENT 11/26/18** 30 Tab 0    fosinopril (MONOPRIL) 40 mg tablet TAKE 1 TABLET BY MOUTH ONCE DAILY **NEEDS  APPOINTMENT  11/1/2018** 30 Tab 0    hydroCHLOROthiazide (HYDRODIURIL) 25 mg tablet TAKE ONE TABLET BY MOUTH ONCE DAILY 90 Tab 2    melatonin tab tablet Take 1 Tab by mouth nightly.  CARTIA  mg ER capsule TAKE ONE CAPSULE BY MOUTH ONCE DAILY 90 Cap 2    miSOPROStol (CYTOTEC) 200 mcg tablet TAKE ONE TABLET BY MOUTH TWICE DAILY 60 Tab 5    JARDIANCE 25 mg tablet Take 25 mg by mouth daily.  ONETOUCH ULTRA2 monitoring kit       glipiZIDE SR (GLUCOTROL) 10 mg CR tablet Take 10 mg by mouth two (2) times a day.  pantoprazole (PROTONIX) 40 mg tablet Take 1 Tab by mouth daily. 1 Tab 1    ONETOUCH ULTRA TEST strip       metFORMIN (GLUCOPHAGE) 1,000 mg tablet TAKE ONE TABLET BY MOUTH TWICE DAILY WITH  MEALS 180 Tab 0    TESTOSTERONE IM by IntraMUSCular route. Every 10 weeks. Aveed      multivitamin (ONE A DAY) tablet Take 1 Tab by mouth daily.         TRESIBA FLEXTOUCH U-100 100 unit/mL (3 mL) inpn 16 Units by SubCUTAneous route daily.      apixaban (ELIQUIS) 5 mg tablet Take 1 Tab by mouth two (2) times a day. 60 Tab 6    sitaGLIPtin (JANUVIA) 100 mg tablet Take 1 Tab by mouth daily. 90 Tab 0     No current facility-administered medications on file prior to visit. ASSESSMENT and PLAN  PAF: he remains in NSR after AF ablation on 9/15. dronedarone stopped on 1/1/16. Previous holter with no AF and NSR. His [id number] VASC score is 3 with 3.2 % cva risk. Continue asa and plavix. Now off eliquis. He will alert me right away if any recurrent  palpitations fatigue sob and off course chest pain. Once again He tells me that when he was in NSR he felt absolutely great therefore he is very symptomatic when in AF   Closely followed by  in the last year  Dr. Warren Fowler aware of 12/18 episode of likely PAF      CAD: completely asymptomatic and active . His ecg shows NSR small inferior q waves and no significant st t changes  His nuclear stress on 6/17 failed to reveal any ischemia, fixed apical defect discussed and potential explanations also discussed. Continue medical rx only unless of occurring symptoms   given underlying dm we ubaldo consider repeat stress test at the next OV  Weight loss and exercise also discussed     HTN:  controlled     HLD: on crestor and tricor. Check lipids    AS: mild some fusion of 2 cusps.  Consider  echo surveillance in 6 months    Obesity: discussed importance of weight loss and exercise !!    ZAK on cpap    AODM: closely followed by his pcp also on jardiance      See him back in 6 months or sooner if any issues

## 2019-01-23 DIAGNOSIS — E66.01 MORBID OBESITY, UNSPECIFIED OBESITY TYPE (HCC): ICD-10-CM

## 2019-01-23 DIAGNOSIS — I10 HTN (HYPERTENSION), BENIGN: ICD-10-CM

## 2019-01-23 DIAGNOSIS — K21.9 GASTROESOPHAGEAL REFLUX DISEASE WITHOUT ESOPHAGITIS: ICD-10-CM

## 2019-01-23 DIAGNOSIS — I48.91 ATRIAL FIBRILLATION, UNSPECIFIED TYPE (HCC): ICD-10-CM

## 2019-01-23 DIAGNOSIS — E78.2 MIXED HYPERLIPIDEMIA: ICD-10-CM

## 2019-01-23 DIAGNOSIS — I35.0 AORTIC VALVE STENOSIS, UNSPECIFIED ETIOLOGY: ICD-10-CM

## 2019-01-23 DIAGNOSIS — I25.10 ATHEROSCLEROSIS OF NATIVE CORONARY ARTERY OF NATIVE HEART WITHOUT ANGINA PECTORIS: ICD-10-CM

## 2019-01-23 RX ORDER — FENOFIBRATE 145 MG/1
145 TABLET, COATED ORAL DAILY
Qty: 90 TAB | Refills: 3 | Status: SHIPPED | OUTPATIENT
Start: 2019-01-23 | End: 2020-01-31 | Stop reason: SDUPTHER

## 2019-01-23 RX ORDER — ROSUVASTATIN CALCIUM 40 MG/1
40 TABLET, COATED ORAL DAILY
Qty: 90 TAB | Refills: 3 | Status: SHIPPED | OUTPATIENT
Start: 2019-01-23 | End: 2020-01-31 | Stop reason: SDUPTHER

## 2019-01-23 RX ORDER — NEBIVOLOL 10 MG/1
10 TABLET ORAL DAILY
Qty: 90 TAB | Refills: 3 | Status: SHIPPED | OUTPATIENT
Start: 2019-01-23 | End: 2019-02-13

## 2019-01-23 NOTE — TELEPHONE ENCOUNTER
Request for tricor 145mg daily, crestor 33JM daily & bystolic 10TU daily. Last office visit 1-22-19, next office visit 7-24-19. Refills per verbal order from Dr. Umm Mora.

## 2019-01-30 LAB
ALBUMIN SERPL-MCNC: 4.7 G/DL (ref 3.5–5.5)
ALP SERPL-CCNC: 37 IU/L (ref 39–117)
ALT SERPL-CCNC: 33 IU/L (ref 0–44)
AST SERPL-CCNC: 28 IU/L (ref 0–40)
BILIRUB DIRECT SERPL-MCNC: 0.13 MG/DL (ref 0–0.4)
BILIRUB SERPL-MCNC: 0.4 MG/DL (ref 0–1.2)
CHOLEST SERPL-MCNC: 195 MG/DL (ref 100–199)
HDLC SERPL-MCNC: 36 MG/DL
INTERPRETATION, 910389: NORMAL
LDLC SERPL CALC-MCNC: ABNORMAL MG/DL (ref 0–99)
PROT SERPL-MCNC: 7.6 G/DL (ref 6–8.5)
TRIGL SERPL-MCNC: 442 MG/DL (ref 0–149)
VLDLC SERPL CALC-MCNC: ABNORMAL MG/DL (ref 5–40)

## 2019-02-05 RX ORDER — DILTIAZEM HYDROCHLORIDE 240 MG/1
CAPSULE, EXTENDED RELEASE ORAL
Qty: 90 CAP | Refills: 2 | Status: SHIPPED | OUTPATIENT
Start: 2019-02-05 | End: 2019-11-04 | Stop reason: SDUPTHER

## 2019-02-12 DIAGNOSIS — I10 HTN (HYPERTENSION), BENIGN: ICD-10-CM

## 2019-02-13 DIAGNOSIS — I48.91 ATRIAL FIBRILLATION, UNSPECIFIED TYPE (HCC): ICD-10-CM

## 2019-02-13 DIAGNOSIS — E66.01 MORBID OBESITY, UNSPECIFIED OBESITY TYPE (HCC): ICD-10-CM

## 2019-02-13 DIAGNOSIS — I48.91 ATRIAL FIBRILLATION BY ELECTROCARDIOGRAM (HCC): ICD-10-CM

## 2019-02-13 DIAGNOSIS — E78.2 MIXED HYPERLIPIDEMIA: Primary | ICD-10-CM

## 2019-02-13 DIAGNOSIS — I25.10 ATHEROSCLEROSIS OF NATIVE CORONARY ARTERY OF NATIVE HEART WITHOUT ANGINA PECTORIS: ICD-10-CM

## 2019-02-13 DIAGNOSIS — K21.9 GASTROESOPHAGEAL REFLUX DISEASE WITHOUT ESOPHAGITIS: ICD-10-CM

## 2019-02-13 DIAGNOSIS — I10 HTN (HYPERTENSION), BENIGN: ICD-10-CM

## 2019-02-13 DIAGNOSIS — I35.0 AORTIC VALVE STENOSIS, UNSPECIFIED ETIOLOGY: ICD-10-CM

## 2019-02-13 RX ORDER — ICOSAPENT ETHYL 1000 MG/1
2 CAPSULE ORAL 2 TIMES DAILY WITH MEALS
Qty: 120 CAP | Refills: 3 | OUTPATIENT
Start: 2019-02-13 | End: 2019-06-28 | Stop reason: SDUPTHER

## 2019-02-13 RX ORDER — FOSINOPRIL SODIUM 40 MG/1
TABLET ORAL
Qty: 30 TAB | Refills: 6 | Status: SHIPPED | OUTPATIENT
Start: 2019-02-13 | End: 2019-08-19 | Stop reason: SDUPTHER

## 2019-02-13 RX ORDER — ICOSAPENT ETHYL 1000 MG/1
CAPSULE ORAL 2 TIMES DAILY WITH MEALS
COMMUNITY
End: 2019-02-13 | Stop reason: SDUPTHER

## 2019-02-13 RX ORDER — CLOPIDOGREL BISULFATE 75 MG/1
TABLET ORAL
Qty: 30 TAB | Refills: 6 | Status: SHIPPED | OUTPATIENT
Start: 2019-02-13 | End: 2019-08-19 | Stop reason: SDUPTHER

## 2019-02-13 RX ORDER — NEBIVOLOL 10 MG/1
10 TABLET ORAL DAILY
Qty: 30 TAB | Refills: 6 | Status: SHIPPED | OUTPATIENT
Start: 2019-02-13 | End: 2020-03-02 | Stop reason: SDUPTHER

## 2019-02-13 RX ORDER — NEBIVOLOL 5 MG/1
5 TABLET ORAL DAILY
COMMUNITY
End: 2019-09-06

## 2019-02-13 NOTE — TELEPHONE ENCOUNTER
Verified patient with two patient identifiers. Spoke with patient regarding his Bystolic and lab results. He was advised to get his lab work done in 2 months and to start new med Vascepa. Lab slip mailed to patient.

## 2019-02-13 NOTE — TELEPHONE ENCOUNTER
Called Pt. Two patient identifiers confirmed.   Let him know to take 15 mg of bistolic and a half a tab as needed

## 2019-02-13 NOTE — TELEPHONE ENCOUNTER
Called Pt. Two patient identifiers confirmed. Let Pt. know triglycerides were to high and to start taking vascepa 2 g BID with meals and to recheck lipids in 4 months. Had a spell of tachycardia at 150 last night 2/12/2019  at 6 then took beta blockers and went down to 75 range.  Would like to know what he should do if happens again

## 2019-08-19 DIAGNOSIS — I10 HTN (HYPERTENSION), BENIGN: ICD-10-CM

## 2019-08-20 RX ORDER — CLOPIDOGREL BISULFATE 75 MG/1
TABLET ORAL
Qty: 90 TAB | Refills: 2 | Status: SHIPPED | OUTPATIENT
Start: 2019-08-20 | End: 2020-03-02 | Stop reason: SDUPTHER

## 2019-08-20 RX ORDER — FOSINOPRIL SODIUM 40 MG/1
TABLET ORAL
Qty: 90 TAB | Refills: 2 | Status: SHIPPED | OUTPATIENT
Start: 2019-08-20 | End: 2020-10-16 | Stop reason: SDUPTHER

## 2019-08-28 DIAGNOSIS — I10 HTN (HYPERTENSION), BENIGN: ICD-10-CM

## 2019-08-28 RX ORDER — HYDROCHLOROTHIAZIDE 25 MG/1
TABLET ORAL
Qty: 90 TAB | Refills: 2 | OUTPATIENT
Start: 2019-08-28

## 2019-08-29 DIAGNOSIS — I10 HTN (HYPERTENSION), BENIGN: ICD-10-CM

## 2019-08-30 ENCOUNTER — PATIENT MESSAGE (OUTPATIENT)
Dept: INTERNAL MEDICINE CLINIC | Age: 54
End: 2019-08-30

## 2019-09-06 ENCOUNTER — OFFICE VISIT (OUTPATIENT)
Dept: CARDIOLOGY CLINIC | Age: 54
End: 2019-09-06

## 2019-09-06 VITALS
HEART RATE: 74 BPM | SYSTOLIC BLOOD PRESSURE: 134 MMHG | HEIGHT: 70 IN | DIASTOLIC BLOOD PRESSURE: 73 MMHG | OXYGEN SATURATION: 97 % | WEIGHT: 263.2 LBS | RESPIRATION RATE: 16 BRPM | BODY MASS INDEX: 37.68 KG/M2

## 2019-09-06 DIAGNOSIS — E11.9 TYPE 2 DIABETES MELLITUS WITHOUT COMPLICATION, WITHOUT LONG-TERM CURRENT USE OF INSULIN (HCC): ICD-10-CM

## 2019-09-06 DIAGNOSIS — I48.91 ATRIAL FIBRILLATION, UNSPECIFIED TYPE (HCC): ICD-10-CM

## 2019-09-06 DIAGNOSIS — I25.10 ATHEROSCLEROSIS OF NATIVE CORONARY ARTERY OF NATIVE HEART WITHOUT ANGINA PECTORIS: ICD-10-CM

## 2019-09-06 DIAGNOSIS — I10 HTN (HYPERTENSION), BENIGN: Primary | ICD-10-CM

## 2019-09-06 DIAGNOSIS — E78.2 MIXED HYPERLIPIDEMIA: ICD-10-CM

## 2019-09-06 RX ORDER — SILDENAFIL CITRATE 20 MG/1
TABLET ORAL
Refills: 11 | COMMUNITY
Start: 2019-08-10 | End: 2021-11-30 | Stop reason: SDUPTHER

## 2019-09-06 RX ORDER — HYDROCHLOROTHIAZIDE 25 MG/1
TABLET ORAL
Qty: 90 TAB | Refills: 2 | Status: SHIPPED | OUTPATIENT
Start: 2019-09-06 | End: 2020-06-03 | Stop reason: SDUPTHER

## 2019-09-06 NOTE — PROGRESS NOTES
HISTORY OF PRESENT ILLNESS  Camilo Mondragon is a 49 y.o. male. history of HTN , HLP, CAD/MI in 2008. S/p PCI LAD in 2008 and additional stent in LAD in 2011. He also has residual 70% RV branch stenosis. Admitted to [facility] with AF and RVR on 7/15 nuclear stress test at that time with no ischemia, apical attenuation ( sub endo mi) and EF 54%. Echo also on 7/15: EF 55-60% no rwma,  bicuspid AV and mild AS  Admitted to [facility] on 7/15 for recurrent AF. DOLORES showed no NEWTON, normal LV and tri leaflet AV with possible partial fusion of 2 leaflets, mild AS, AI and MR . He failed cardioversion with persistent AF   successful dc cardioversion on 7/15 while on multaq  Recurrent AF afterwards on 7/15  and eventually AF ablation with Dr. Osiel Santiago on 9/15  Stopped toprol on his own accord on 11/15 on account of excessive sob  He has GB disease and stones 2015  Echo on 8/16:Left ventricle: Size was normal. Systolic function was normal by visual  assessment. Ejection fraction was estimated to be 60 %. There were no  regional wall motion abnormalities. Wall thickness was mildly increased. Aortic valve: There was an apparent echogenic mass of tissue appearing in  the outflow tract adherent to the right coronary cusp; unknown if  clinically significant. Leaflets exhibited moderately increased thickness,  moderate calcification, and mildly reduced cuspal separation. Transaortic  velocity was increased due to valvular stenosis. There was mild stenosis. VIRA was 2.0 cm2, peak/mean gradients were 29/17 mmHg, and the DI was 0.45. There was mild regurgitation. Aorta, systemic arteries: The root exhibited normal size; however, the  ascending aorta was mildly enlarged; sinus of valsalva was 3.8 cm, the  ST-J was 3.5 cm, and the ascending aorta was 4.0 cm.   ER visit for cp on 5/17 normal w/u but elevated BP  NUCLEAR STRESS TEST on 6/2/17 no gross ischemia fixed apical and infero apical defect EF 60%   ECHO on 5/17: EF 60% mild AS mean gradient of 14 mmhg mild VA                Past Medical History    Diagnosis  Date      ZAK (obstructive sleep apnea)  3/31/2010      Type II or unspecified type diabetes mellitus without mention of complication, not stated as uncontrolled ([facility])  3/31/2007      HTN (hypertension), benign  3/31/2010      Mixed hyperlipidemia  3/31/2010      GERD (gastroesophageal reflux disease)  3/31/2010      MI (myocardial infarction) ([facility])  2/26/2007          stent x2, 3rd stent ~5yrs later, Plavix      Obese  3/31/2010      Hypercholesterolemia         Arthritis         Headache(784.0)         Low serum testosterone level  07/2013      A-fib ([facility])  7/9/2015       [address]            Past Surgical History    Procedure  Laterality  Date      Hx orthopaedic     2007          L Knee Arthroscopy      Pr cardiac surg procedure unlist                4 stents placed 1442,0441      Hx angioplasty            Hx afib ablation              History                      Social History      Marital Status:            Spouse Name:  N/A          Number of Children:  N/A      Years of Education:  N/A                Occupational History      Not on file.                      Social History Main Topics      Smoking status:  Never Smoker       Smokeless tobacco:  Never Used      Alcohol Use:  No      Drug Use:  No      Sexual Activity:             Partners:  Female          Birth Control/ Protection:  None                   Other Topics  Concern      Not on file        Social History Narrative        HPI  Doing well no cp or sob or palpitations reported   active  Review of Systems   Constitutional: Negative. Respiratory: Negative. Cardiovascular: Negative. Visit Vitals  /73 (BP 1 Location: Right arm, BP Patient Position: Sitting)   Pulse 74   Resp 16   Ht 5' 10\" (1.778 m)   Wt 263 lb 3.2 oz (119.4 kg)   SpO2 97%   BMI 37.77 kg/m²       Physical Exam   Neck: No JVD present. Carotid bruit is not present. Cardiovascular: Normal rate and regular rhythm. Murmur heard. Systolic murmur is present with a grade of 1/6. Pulmonary/Chest: Effort normal and breath sounds normal.   Abdominal: Soft. Musculoskeletal: He exhibits no edema. Psychiatric: He has a normal mood and affect. Current Outpatient Medications on File Prior to Visit   Medication Sig Dispense Refill    sildenafil, antihypertensive, (REVATIO) 20 mg tablet TAKE UP TO 5 TABS BY MOUTH AS DIRECTED  11    OTHER humulog insulin 20 units/24 hours continuously via insulin pump.  OTHER humulog insulin sliding tid with meals.  semaglutide (OZEMPIC SC) by SubCUTAneous route every seven (7) days.  clopidogrel (PLAVIX) 75 mg tab TAKE 1 TABLET BY MOUTH ONCE DAILY 90 Tab 2    fosinopril (MONOPRIL) 40 mg tablet TAKE 1 TABLET BY MOUTH ONCE DAILY 90 Tab 2    VASCEPA 1 gram capsule TAKE 2 CAPSULES BY MOUTH TWICE DAILY 120 Cap 3    nebivolol (BYSTOLIC) 10 mg tablet Take 1 Tab by mouth daily. 30 Tab 6    CARTIA  mg ER capsule TAKE 1 CAPSULE BY MOUTH ONCE DAILY 90 Cap 2    rosuvastatin (CRESTOR) 40 mg tablet Take 1 Tab by mouth daily. 90 Tab 3    fenofibrate nanocrystallized (TRICOR) 145 mg tablet Take 1 Tab by mouth daily. 90 Tab 3    JARDIANCE 25 mg tablet Take 25 mg by mouth daily.  ONETOUCH ULTRA2 monitoring kit       pantoprazole (PROTONIX) 40 mg tablet Take 1 Tab by mouth daily. 1 Tab 1    ONETOUCH ULTRA TEST strip       metFORMIN (GLUCOPHAGE) 1,000 mg tablet TAKE ONE TABLET BY MOUTH TWICE DAILY WITH  MEALS 180 Tab 0    multivitamin (ONE A DAY) tablet Take 1 Tab by mouth daily.  hydroCHLOROthiazide (HYDRODIURIL) 25 mg tablet TAKE ONE TABLET BY MOUTH ONCE DAILY 90 Tab 2    TESTOSTERONE IM by IntraMUSCular route. Every 10 weeks. Aveed       No current facility-administered medications on file prior to visit.       Lab Results   Component Value Date/Time    Cholesterol, total 195 01/29/2019 08:56 AM    HDL Cholesterol 36 (L) 01/29/2019 08:56 AM    LDL,Direct 80 07/10/2015 04:44 AM    LDL, calculated Comment 01/29/2019 08:56 AM    VLDL, calculated Comment 01/29/2019 08:56 AM    Triglyceride 442 (H) 01/29/2019 08:56 AM    CHOL/HDL Ratio 5.6 (H) 07/10/2015 04:44 AM     Lab Results   Component Value Date/Time    ALT (SGPT) 33 01/29/2019 08:56 AM    AST (SGOT) 28 01/29/2019 08:56 AM    Alk. phosphatase 37 (L) 01/29/2019 08:56 AM    Bilirubin, direct 0.13 01/29/2019 08:56 AM    Bilirubin, total 0.4 01/29/2019 08:56 AM       ASSESSMENT and PLAN  PAF: he remains in NSR after AF ablation on 9/15. dronedarone stopped on 1/1/16. Previous holter with no AF and NSR. His [id number] VASC score is 3 with 3.2 % cva risk. Continue asa and plavix. Now off eliquis. He will alert me right away if any recurrent  palpitations fatigue sob and off course chest pain. Once again He tells me that when he was in NSR he felt absolutely great therefore he is very symptomatic when in AF   Closely followed by  in the last year  Dr. Loretta Agee aware of 12/18 episode of likely PAF        CAD: completely asymptomatic and active . His ecg shows NSR small inferior q waves and no significant st t changes  His nuclear stress on 6/17 failed to reveal any ischemia, fixed apical defect discussed and potential explanations also discussed. Continue medical rx only unless of occurring symptoms   given underlying dm we will proceed with stress echo >2 years after last one  Weight loss and exercise also discussed     HTN:  controlled      HLD: on crestor and tricor. Check lipids     AS: mild some fusion of 2 cusps.  Consider  echo surveillance in 6 months     Obesity: discussed importance of weight loss and exercise !!     ZAK on cpap     AODM: closely followed by his pcp also on jardiance        See him back in 6 months or sooner if any issues

## 2019-09-06 NOTE — PROGRESS NOTES
Visit Vitals  /73 (BP 1 Location: Right arm, BP Patient Position: Sitting)   Pulse 74   Resp 16   Ht 5' 10\" (1.778 m)   Wt 263 lb 3.2 oz (119.4 kg)   SpO2 97%   BMI 37.77 kg/m²

## 2019-09-06 NOTE — PROGRESS NOTES
Chief Complaint   Patient presents with    Follow-up     Denies chest pain/shortness of breath/swelling/dizziness.       Visit Vitals  /73 (BP 1 Location: Right arm, BP Patient Position: Sitting)   Pulse 74   Resp 16   Ht 5' 10\" (1.778 m)   Wt 263 lb 3.2 oz (119.4 kg)   SpO2 97%   BMI 37.77 kg/m²

## 2019-09-06 NOTE — PATIENT INSTRUCTIONS
Stress Echo now    Follow up with Gabrielle Black in 6 months    Samantha Perez MD [id number] [facility]

## 2019-09-12 LAB
ALBUMIN SERPL-MCNC: 4.7 G/DL (ref 3.5–5.5)
ALP SERPL-CCNC: 34 IU/L (ref 39–117)
ALT SERPL-CCNC: 30 IU/L (ref 0–44)
AST SERPL-CCNC: 27 IU/L (ref 0–40)
BILIRUB DIRECT SERPL-MCNC: 0.14 MG/DL (ref 0–0.4)
BILIRUB SERPL-MCNC: 0.3 MG/DL (ref 0–1.2)
CHOLEST SERPL-MCNC: 139 MG/DL (ref 100–199)
HDLC SERPL-MCNC: 36 MG/DL
INTERPRETATION, 910389: NORMAL
LDLC SERPL CALC-MCNC: 50 MG/DL (ref 0–99)
PROT SERPL-MCNC: 7.5 G/DL (ref 6–8.5)
TRIGL SERPL-MCNC: 266 MG/DL (ref 0–149)
VLDLC SERPL CALC-MCNC: 53 MG/DL (ref 5–40)

## 2019-09-16 ENCOUNTER — TELEPHONE (OUTPATIENT)
Dept: CARDIOLOGY CLINIC | Age: 54
End: 2019-09-16

## 2019-09-16 NOTE — TELEPHONE ENCOUNTER
Verified patient with two patient identifiers. Spoke with patient and gave lab result. Per Greg Francheska needs to reduce triglycerides,low carbs and follow up with pcp for diabetes.

## 2019-11-04 RX ORDER — DILTIAZEM HYDROCHLORIDE 240 MG/1
CAPSULE, COATED, EXTENDED RELEASE ORAL
Qty: 90 CAP | Refills: 2 | Status: SHIPPED | OUTPATIENT
Start: 2019-11-04 | End: 2020-07-27 | Stop reason: SDUPTHER

## 2019-11-12 ENCOUNTER — TELEPHONE (OUTPATIENT)
Dept: CARDIOLOGY CLINIC | Age: 54
End: 2019-11-12

## 2019-11-13 PROBLEM — M19.90 ARTHRITIS: Status: ACTIVE | Noted: 2019-11-13

## 2019-11-13 NOTE — PROGRESS NOTES
History of Present Illness   Chief Complaint   Establish care    Inna Olson is a 49 y.o. male     Followed by several specialists for most of his problems    Hypertension-takes hydrochlorothiazide, Monopril. Normally in the 130s at home. Denies any chest pain, shortness of breath, headaches    A. fib-followed by cards. Denies any palpitations    Erectile dysfunction-takes sildenafil. No problems    Diabetes-followed by Dr. Silvia Sullivan.  On a pump. Reports that his blood sugars are well controlled at home. Denies any hypoglycemia. Coronary artery disease status post stents-taking Plavix and aspirin    GERD-taking pantoprazole. Followed by Dr. Pastor Boyd. Reports symptoms are well controlled    Hypogonadism-taking testosterone. Followed by urology    CKD- followed by nephrology    Recent concussion-headaches are improving. Occurred after work accident about a month ago. Review of Systems   Constitutional: Negative for chills and fever. HENT: Positive for hearing loss. Eyes: Negative for blurred vision. Respiratory: Negative for shortness of breath. Cardiovascular: Negative for chest pain. Gastrointestinal: Negative for abdominal pain, blood in stool, constipation, diarrhea, melena, nausea and vomiting. Genitourinary: Negative for dysuria and hematuria. Musculoskeletal: Negative for joint pain. Skin: Negative for rash. Neurological: Negative for headaches. Past Medical History     Allergies   Allergen Reactions    Cleocin [Clindamycin Hcl] Rash        Current Outpatient Medications   Medication Sig    dilTIAZem CD (CARTIA XT) 240 mg ER capsule TAKE 1 CAPSULE BY MOUTH ONCE DAILY    VASCEPA 1 gram capsule TAKE 2 CAPSULES BY MOUTH TWICE DAILY (Patient taking differently: Take 2 Caps by mouth two (2) times daily (with meals). )    hydroCHLOROthiazide (HYDRODIURIL) 25 mg tablet TAKE ONE TABLET BY MOUTH ONCE DAILY    sildenafil, antihypertensive, (REVATIO) 20 mg tablet TAKE UP TO 5 TABS BY MOUTH AS DIRECTED    OTHER humulog insulin 20 units/24 hours continuously via insulin pump.  OTHER humulog insulin sliding tid with meals.  semaglutide (OZEMPIC SC) by SubCUTAneous route every seven (7) days.  clopidogrel (PLAVIX) 75 mg tab TAKE 1 TABLET BY MOUTH ONCE DAILY    nebivolol (BYSTOLIC) 10 mg tablet Take 1 Tab by mouth daily.  rosuvastatin (CRESTOR) 40 mg tablet Take 1 Tab by mouth daily.  fenofibrate nanocrystallized (TRICOR) 145 mg tablet Take 1 Tab by mouth daily.  JARDIANCE 25 mg tablet Take 25 mg by mouth daily.  ONETOUCH ULTRA2 monitoring kit     pantoprazole (PROTONIX) 40 mg tablet Take 1 Tab by mouth daily.  ONETOUCH ULTRA TEST strip     metFORMIN (GLUCOPHAGE) 1,000 mg tablet TAKE ONE TABLET BY MOUTH TWICE DAILY WITH  MEALS    TESTOSTERONE IM by IntraMUSCular route. Every 10 weeks. Aveed    multivitamin (ONE A DAY) tablet Take 1 Tab by mouth daily.  fosinopril (MONOPRIL) 40 mg tablet TAKE 1 TABLET BY MOUTH ONCE DAILY (Patient taking differently: Indications: not taking)     No current facility-administered medications for this visit.            Patient Active Problem List   Diagnosis Code    Type 2 diabetes mellitus with stage 3 chronic kidney disease, with long-term current use of insulin ([facility]) E11.22, N18.3, Z79.4    HTN (hypertension), benign I10    Mixed hyperlipidemia E78.2    Coronary Atherosclerosis of Native Coronary Artery- restenosis and restenting 6/2011-Dr. Tirado I25.10    Gastroesophageal reflux disease without esophagitis K21.9    MI (myocardial infarction) ([facility] I21.9    Class 2 severe obesity due to excess calories with serious comorbidity and body mass index (BMI) of 35.0 to 35.9 in adult ([facility]) E66.01, Z68.35    ZAK (obstructive sleep apnea) G47.33    Colon polyp K63.5    Recurrent major depressive disorder, in remission ([facility] F33.40    A-fib ([facility] I48.91    Other male erectile dysfunction N52.8    Hypogonadism in male E29.1  S/P ablation of atrial fibrillation Z98.890, Z86.79    Cholelithiasis without cholecystitis K80.20    Gastroparesis K31.84    Type 2 diabetes with nephropathy (HCC) E11.21    CKD (chronic kidney disease) stage 3, GFR 30-59 ml/min ([facility]) N18.3    Arthritis M19.90     Past Surgical History:   Procedure Laterality Date    CARDIAC SURG PROCEDURE UNLIST      4 stents placed 2009,2011    HX AFIB ABLATION      HX ANGIOPLASTY      HX ORTHOPAEDIC  2007    L Knee Arthroscopy      Social History     Tobacco Use    Smoking status: Never Smoker    Smokeless tobacco: Never Used   Substance Use Topics    Alcohol use: No     Alcohol/week: 0.0 standard drinks      Family History   Problem Relation Age of Onset    Arthritis-osteo Mother     Diabetes Mother     Elevated Lipids Mother     Heart Disease Mother     Hypertension Mother     Elevated Lipids Father     Heart Disease Father     Hypertension Father     Cancer Father         skin and angiosarcoma    Diabetes Maternal Grandmother     Hypertension Brother     Diabetes Brother     Hypertension Brother     Diabetes Brother     Hypertension Brother         Physical Exam   Vitals:       Visit Vitals  /70   Pulse 79   Temp 97.6 °F (36.4 °C) (Oral)   Resp 12   Ht 5' 10\" (1.778 m)   Wt 265 lb (120.2 kg)   SpO2 95%   BMI 38.02 kg/m²        Physical Exam   Constitutional: He is oriented to person, place, and time and well-developed, well-nourished, and in no distress. No distress. HENT:   Left Ear: External ear normal.   Mouth/Throat: Oropharynx is clear and moist.   Wax impaction right ear   Eyes: Conjunctivae and EOM are normal.   Neck: Neck supple. No thyromegaly present. Cardiovascular: Normal rate, regular rhythm and intact distal pulses. Exam reveals no gallop and no friction rub. No murmur heard. Pulmonary/Chest: No respiratory distress. He has no wheezes. He has no rales. Abdominal: Soft.  Bowel sounds are normal. He exhibits no distension. There is no hepatosplenomegaly. There is no tenderness. Neurological: He is alert and oriented to person, place, and time. Skin: Skin is warm. No rash noted. Psychiatric: Affect and judgment normal.        Assessment and Plan     Diagnoses and all orders for this visit:    1. CKD (chronic kidney disease) stage 3, GFR 30-59 ml/min ([facility])  -     MICROALBUMIN, UR, RAND W/ MICROALB/CREAT RATIO; Future  -     PHOSPHORUS; Future  -     PTH INTACT; Future  -     VITAMIN D, 25 HYDROXY; Future  -     FERRITIN; Future  Followed by nephrology      2. Routine adult health maintenance  -     METABOLIC PANEL, BASIC; Future  -     CBC WITH AUTOMATED DIFF; Future  Flu shot last month    3. HTN (hypertension), benign  Well-controlled, continue medications    4. Atherosclerosis of native coronary artery of native heart without angina pectoris  Followed by cards    5. Paroxysmal atrial fibrillation (HCC)  Well-controlled, followed by cards    6. Gastroesophageal reflux disease without esophagitis  Well-controlled followed by GI    7. Type 2 diabetes mellitus with stage 3 chronic kidney disease, with long-term current use of insulin ([facility]  Patient reports his A1c is 6.9. Followed by endocrinology. On a pump. Continue medications per endocrinology    8. Hypogonadism in male  Continue testosterone per urology    9. Mixed hyperlipidemia  Continue medications per cards    10. Class 2 severe obesity due to excess calories with serious comorbidity and body mass index (BMI) of 35.0 to 35.9 in [facility])  Discussed dietary changes    11. Other male erectile dysfunction  Continue sildenafil as needed    Follow-up and Dispositions    · Return in about 1 year (around 11/18/2020). Benefits, risks, possible drug interactions, and side effects of all new medications were reviewed with the patient. Pt verbalized understanding.     Return to clinic: 1 year for physical.    Garima Kerr MD  [facility]  11/18/2019    Future Appointments   Date Time Provider Boby Liya   3/19/2020  9:00 AM MD lee ann Lara   96/86/0844  1:29 AM Lizabeth Del Cid MD [address] [address]

## 2019-11-18 ENCOUNTER — HOSPITAL ENCOUNTER (OUTPATIENT)
Dept: LAB | Age: 54
Discharge: HOME | End: 2019-11-18

## 2019-11-18 ENCOUNTER — OFFICE VISIT (OUTPATIENT)
Dept: INTERNAL MEDICINE CLINIC | Age: 54
End: 2019-11-18

## 2019-11-18 VITALS
HEIGHT: 70 IN | RESPIRATION RATE: 12 BRPM | HEART RATE: 79 BPM | WEIGHT: 265 LBS | TEMPERATURE: 97.6 F | SYSTOLIC BLOOD PRESSURE: 128 MMHG | BODY MASS INDEX: 37.94 KG/M2 | OXYGEN SATURATION: 95 % | DIASTOLIC BLOOD PRESSURE: 70 MMHG

## 2019-11-18 DIAGNOSIS — Z79.4 TYPE 2 DIABETES MELLITUS WITH STAGE 3 CHRONIC KIDNEY DISEASE, WITH LONG-TERM CURRENT USE OF INSULIN (HCC): ICD-10-CM

## 2019-11-18 DIAGNOSIS — N18.30 TYPE 2 DIABETES MELLITUS WITH STAGE 3 CHRONIC KIDNEY DISEASE, WITH LONG-TERM CURRENT USE OF INSULIN (HCC): ICD-10-CM

## 2019-11-18 DIAGNOSIS — Z00.00 ROUTINE ADULT HEALTH MAINTENANCE: ICD-10-CM

## 2019-11-18 DIAGNOSIS — N18.30 CKD (CHRONIC KIDNEY DISEASE) STAGE 3, GFR 30-59 ML/MIN (HCC): Primary | ICD-10-CM

## 2019-11-18 DIAGNOSIS — E29.1 HYPOGONADISM IN MALE: ICD-10-CM

## 2019-11-18 DIAGNOSIS — K21.9 GASTROESOPHAGEAL REFLUX DISEASE WITHOUT ESOPHAGITIS: ICD-10-CM

## 2019-11-18 DIAGNOSIS — N18.30 CKD (CHRONIC KIDNEY DISEASE) STAGE 3, GFR 30-59 ML/MIN (HCC): ICD-10-CM

## 2019-11-18 DIAGNOSIS — I25.10 ATHEROSCLEROSIS OF NATIVE CORONARY ARTERY OF NATIVE HEART WITHOUT ANGINA PECTORIS: ICD-10-CM

## 2019-11-18 DIAGNOSIS — I48.0 PAROXYSMAL ATRIAL FIBRILLATION (HCC): ICD-10-CM

## 2019-11-18 DIAGNOSIS — E11.22 TYPE 2 DIABETES MELLITUS WITH STAGE 3 CHRONIC KIDNEY DISEASE, WITH LONG-TERM CURRENT USE OF INSULIN (HCC): ICD-10-CM

## 2019-11-18 DIAGNOSIS — I10 HTN (HYPERTENSION), BENIGN: ICD-10-CM

## 2019-11-18 DIAGNOSIS — E66.01 CLASS 2 SEVERE OBESITY DUE TO EXCESS CALORIES WITH SERIOUS COMORBIDITY AND BODY MASS INDEX (BMI) OF 35.0 TO 35.9 IN ADULT (HCC): ICD-10-CM

## 2019-11-18 DIAGNOSIS — N52.8 OTHER MALE ERECTILE DYSFUNCTION: ICD-10-CM

## 2019-11-18 DIAGNOSIS — E78.2 MIXED HYPERLIPIDEMIA: ICD-10-CM

## 2019-11-18 LAB
25(OH)D3 SERPL-MCNC: 19.6 NG/ML (ref 30–100)
ANION GAP SERPL CALC-SCNC: 6 MMOL/L (ref 5–15)
BASOPHILS # BLD: 0.1 K/UL (ref 0–0.1)
BASOPHILS NFR BLD: 1 % (ref 0–1)
BUN SERPL-MCNC: 34 MG/DL (ref 6–20)
BUN/CREAT SERPL: 20 (ref 12–20)
CALCIUM SERPL-MCNC: 9.7 MG/DL (ref 8.5–10.1)
CALCIUM SERPL-MCNC: 9.8 MG/DL (ref 8.5–10.1)
CHLORIDE SERPL-SCNC: 106 MMOL/L (ref 97–108)
CO2 SERPL-SCNC: 30 MMOL/L (ref 21–32)
CREAT SERPL-MCNC: 1.71 MG/DL (ref 0.7–1.3)
DIFFERENTIAL METHOD BLD: NORMAL
EOSINOPHIL # BLD: 0.3 K/UL (ref 0–0.4)
EOSINOPHIL NFR BLD: 5 % (ref 0–7)
ERYTHROCYTE [DISTWIDTH] IN BLOOD BY AUTOMATED COUNT: 13.6 % (ref 11.5–14.5)
FERRITIN SERPL-MCNC: 151 NG/ML (ref 26–388)
GLUCOSE SERPL-MCNC: 167 MG/DL (ref 65–100)
HCT VFR BLD AUTO: 50 % (ref 36.6–50.3)
HGB BLD-MCNC: 15.1 G/DL (ref 12.1–17)
IMM GRANULOCYTES # BLD AUTO: 0 K/UL (ref 0–0.04)
IMM GRANULOCYTES NFR BLD AUTO: 0 % (ref 0–0.5)
LYMPHOCYTES # BLD: 1.6 K/UL (ref 0.8–3.5)
LYMPHOCYTES NFR BLD: 26 % (ref 12–49)
MCH RBC QN AUTO: 28.4 PG (ref 26–34)
MCHC RBC AUTO-ENTMCNC: 30.2 G/DL (ref 30–36.5)
MCV RBC AUTO: 94.2 FL (ref 80–99)
MONOCYTES # BLD: 0.7 K/UL (ref 0–1)
MONOCYTES NFR BLD: 11 % (ref 5–13)
NEUTS SEG # BLD: 3.6 K/UL (ref 1.8–8)
NEUTS SEG NFR BLD: 57 % (ref 32–75)
NRBC # BLD: 0 K/UL (ref 0–0.01)
NRBC BLD-RTO: 0 PER 100 WBC
PHOSPHATE SERPL-MCNC: 3.6 MG/DL (ref 2.6–4.7)
PLATELET # BLD AUTO: 222 K/UL (ref 150–400)
PMV BLD AUTO: 10 FL (ref 8.9–12.9)
POTASSIUM SERPL-SCNC: 4.6 MMOL/L (ref 3.5–5.1)
PTH-INTACT SERPL-MCNC: 34.8 PG/ML (ref 18.4–88)
RBC # BLD AUTO: 5.31 M/UL (ref 4.1–5.7)
SODIUM SERPL-SCNC: 142 MMOL/L (ref 136–145)
WBC # BLD AUTO: 6.2 K/UL (ref 4.1–11.1)

## 2019-11-19 LAB
CREAT UR-MCNC: 64.2 MG/DL
MICROALBUMIN UR-MCNC: 0.53 MG/DL
MICROALBUMIN/CREAT UR-RTO: 8 MG/G (ref 0–30)

## 2019-11-20 DIAGNOSIS — E55.9 VITAMIN D DEFICIENCY: Primary | ICD-10-CM

## 2019-11-20 RX ORDER — ACETAMINOPHEN 500 MG
2000 TABLET ORAL DAILY
Qty: 90 CAP | Refills: 3 | Status: SHIPPED | OUTPATIENT
Start: 2019-11-20 | End: 2020-02-18

## 2019-11-21 PROBLEM — I25.2 HISTORY OF MI (MYOCARDIAL INFARCTION): Status: ACTIVE | Noted: 2019-11-21

## 2019-12-02 DIAGNOSIS — I10 HTN (HYPERTENSION), BENIGN: ICD-10-CM

## 2019-12-02 RX ORDER — FOSINOPRIL SODIUM 40 MG/1
TABLET ORAL
Qty: 30 TAB | Refills: 6 | Status: SHIPPED | OUTPATIENT
Start: 2019-12-02 | End: 2020-10-16 | Stop reason: SDUPTHER

## 2019-12-02 RX ORDER — CLOPIDOGREL BISULFATE 75 MG/1
TABLET ORAL
Qty: 30 TAB | Refills: 6 | Status: SHIPPED | OUTPATIENT
Start: 2019-12-02 | End: 2020-10-16 | Stop reason: SDUPTHER

## 2020-01-31 ENCOUNTER — TELEPHONE (OUTPATIENT)
Dept: CARDIOLOGY CLINIC | Age: 55
End: 2020-01-31

## 2020-01-31 DIAGNOSIS — I48.91 ATRIAL FIBRILLATION, UNSPECIFIED TYPE (HCC): ICD-10-CM

## 2020-01-31 DIAGNOSIS — I10 HTN (HYPERTENSION), BENIGN: ICD-10-CM

## 2020-01-31 DIAGNOSIS — I25.10 ATHEROSCLEROSIS OF NATIVE CORONARY ARTERY OF NATIVE HEART WITHOUT ANGINA PECTORIS: ICD-10-CM

## 2020-01-31 DIAGNOSIS — E66.01 MORBID OBESITY, UNSPECIFIED OBESITY TYPE (HCC): ICD-10-CM

## 2020-01-31 DIAGNOSIS — I35.0 AORTIC VALVE STENOSIS, UNSPECIFIED ETIOLOGY: ICD-10-CM

## 2020-01-31 DIAGNOSIS — E78.2 MIXED HYPERLIPIDEMIA: ICD-10-CM

## 2020-01-31 DIAGNOSIS — K21.9 GASTROESOPHAGEAL REFLUX DISEASE WITHOUT ESOPHAGITIS: ICD-10-CM

## 2020-01-31 RX ORDER — ROSUVASTATIN CALCIUM 40 MG/1
40 TABLET, COATED ORAL DAILY
Qty: 90 TAB | Refills: 1 | Status: SHIPPED | OUTPATIENT
Start: 2020-01-31 | End: 2020-07-27 | Stop reason: SDUPTHER

## 2020-01-31 RX ORDER — FENOFIBRATE 145 MG/1
145 TABLET, COATED ORAL DAILY
Qty: 90 TAB | Refills: 1 | Status: SHIPPED | OUTPATIENT
Start: 2020-01-31 | End: 2020-07-27 | Stop reason: SDUPTHER

## 2020-01-31 NOTE — TELEPHONE ENCOUNTER
Requested Prescriptions     Signed Prescriptions Disp Refills    rosuvastatin (CRESTOR) 40 mg tablet 90 Tab 1     Sig: Take 1 Tab by mouth daily. Authorizing Provider: Paul Anguiano     Ordering User: Ana Hudson     Verbal order per Dr. Tyson Aguiar.     Future Appointments   Date Time Provider Boby Nickerson   3/19/2020  9:00 AM MD lee ann Longoria   71/91/2694  8:80 AM Jose Rincon MD [address] [facility] [address]

## 2020-01-31 NOTE — TELEPHONE ENCOUNTER
Requested Prescriptions     Signed Prescriptions Disp Refills    fenofibrate nanocrystallized (TRICOR) 145 mg tablet 90 Tab 1     Sig: Take 1 Tab by mouth daily. Authorizing Provider: Frederic Ayala     Ordering User: Es Gordon     Verbal order per Dr. Becky Tran.     Future Appointments   Date Time Provider Boby Nickerson   3/19/2020  9:00 AM Joni Gallegos MD [facility]   57/01/8466  4:29 AM Adam Duque MD [address] [address]

## 2020-03-02 DIAGNOSIS — E78.2 MIXED HYPERLIPIDEMIA: ICD-10-CM

## 2020-03-02 DIAGNOSIS — I10 HTN (HYPERTENSION), BENIGN: ICD-10-CM

## 2020-03-02 DIAGNOSIS — I35.0 AORTIC VALVE STENOSIS, UNSPECIFIED ETIOLOGY: ICD-10-CM

## 2020-03-02 DIAGNOSIS — I48.91 ATRIAL FIBRILLATION, UNSPECIFIED TYPE (HCC): ICD-10-CM

## 2020-03-02 DIAGNOSIS — E66.01 MORBID OBESITY, UNSPECIFIED OBESITY TYPE (HCC): ICD-10-CM

## 2020-03-02 DIAGNOSIS — I25.10 ATHEROSCLEROSIS OF NATIVE CORONARY ARTERY OF NATIVE HEART WITHOUT ANGINA PECTORIS: ICD-10-CM

## 2020-03-02 DIAGNOSIS — K21.9 GASTROESOPHAGEAL REFLUX DISEASE WITHOUT ESOPHAGITIS: ICD-10-CM

## 2020-03-02 RX ORDER — NEBIVOLOL 10 MG/1
10 TABLET ORAL DAILY
Qty: 90 TAB | Refills: 1 | Status: SHIPPED | OUTPATIENT
Start: 2020-03-02 | End: 2020-09-16 | Stop reason: SDUPTHER

## 2020-04-09 RX ORDER — ICOSAPENT ETHYL 1000 MG/1
2 CAPSULE ORAL 2 TIMES DAILY WITH MEALS
Qty: 120 CAP | Refills: 1 | Status: SHIPPED | OUTPATIENT
Start: 2020-04-09 | End: 2020-06-29

## 2020-06-03 DIAGNOSIS — I10 HTN (HYPERTENSION), BENIGN: ICD-10-CM

## 2020-06-03 RX ORDER — HYDROCHLOROTHIAZIDE 25 MG/1
TABLET ORAL
Qty: 90 TAB | Refills: 0 | Status: SHIPPED | OUTPATIENT
Start: 2020-06-03 | End: 2020-09-08

## 2020-06-03 NOTE — TELEPHONE ENCOUNTER
Requested Prescriptions     Signed Prescriptions Disp Refills    hydroCHLOROthiazide (HYDRODIURIL) 25 mg tablet 90 Tab 0     Sig: TAKE ONE TABLET BY MOUTH ONCE DAILY  Please schedule follow up with Dr. Erlinda Muñoz. Authorizing Provider: Reno William     Ordering User: Ivan Dillon     Verbal order per Dr. Erlinda Muñoz.

## 2020-06-29 RX ORDER — ICOSAPENT ETHYL 1000 MG/1
2 CAPSULE ORAL 2 TIMES DAILY WITH MEALS
Qty: 360 CAP | Refills: 0 | Status: SHIPPED | OUTPATIENT
Start: 2020-06-29 | End: 2020-09-28

## 2020-06-29 NOTE — TELEPHONE ENCOUNTER
Requested Prescriptions     Signed Prescriptions Disp Refills    icosapent ethyL (Vascepa) 1 gram capsule 360 Cap 0     Sig: Take 2 Caps by mouth two (2) times daily (with meals). Please schedule follow up with Dr. Jo Verdugo. Authorizing Provider: Christiana Bocanegra     Ordering User: Baljit Hendricks     Verbal order per Dr. Jenna Bloom.

## 2020-07-27 DIAGNOSIS — I25.10 ATHEROSCLEROSIS OF NATIVE CORONARY ARTERY OF NATIVE HEART WITHOUT ANGINA PECTORIS: ICD-10-CM

## 2020-07-27 DIAGNOSIS — I35.0 AORTIC VALVE STENOSIS, UNSPECIFIED ETIOLOGY: ICD-10-CM

## 2020-07-27 DIAGNOSIS — K21.9 GASTROESOPHAGEAL REFLUX DISEASE WITHOUT ESOPHAGITIS: ICD-10-CM

## 2020-07-27 DIAGNOSIS — I10 HTN (HYPERTENSION), BENIGN: ICD-10-CM

## 2020-07-27 DIAGNOSIS — E66.01 MORBID OBESITY, UNSPECIFIED OBESITY TYPE (HCC): ICD-10-CM

## 2020-07-27 DIAGNOSIS — E78.2 MIXED HYPERLIPIDEMIA: ICD-10-CM

## 2020-07-27 DIAGNOSIS — I48.91 ATRIAL FIBRILLATION, UNSPECIFIED TYPE (HCC): ICD-10-CM

## 2020-07-27 RX ORDER — FENOFIBRATE 145 MG/1
145 TABLET, COATED ORAL DAILY
Qty: 90 TAB | Refills: 0 | Status: SHIPPED | OUTPATIENT
Start: 2020-07-27 | End: 2020-09-28

## 2020-07-27 RX ORDER — DILTIAZEM HYDROCHLORIDE 240 MG/1
CAPSULE, COATED, EXTENDED RELEASE ORAL
Qty: 90 CAP | Refills: 0 | Status: SHIPPED | OUTPATIENT
Start: 2020-07-27 | End: 2020-09-28

## 2020-07-27 RX ORDER — ROSUVASTATIN CALCIUM 40 MG/1
40 TABLET, COATED ORAL DAILY
Qty: 90 TAB | Refills: 0 | Status: SHIPPED | OUTPATIENT
Start: 2020-07-27 | End: 2020-09-28

## 2020-07-27 NOTE — TELEPHONE ENCOUNTER
Requested Prescriptions     Signed Prescriptions Disp Refills    rosuvastatin (CRESTOR) 40 mg tablet 90 Tab 0     Sig: Take 1 Tab by mouth daily. Please schedule follow up with Dr. Yecenia Go. Authorizing Provider: Seymour Roberson     Ordering User: Chema Foster    fenofibrate nanocrystallized (TRICOR) 145 mg tablet 90 Tab 0     Sig: Take 1 Tab by mouth daily. Please schedule follow up with Dr. Yecenia Go. Authorizing Provider: Seymour Roberson     Ordering User: Feliberto ANDREW    dilTIAZem ER (Cartia XT) 240 mg capsule 90 Cap 0     Sig: TAKE 1 CAPSULE BY MOUTH ONCE DAILY Please schedule follow up with Dr. Yecenia Go. Authorizing Provider: Seymour Roberson     Ordering User: Chema Foster     Verbal order per Dr. Yecenia Go.

## 2020-09-08 DIAGNOSIS — I10 HTN (HYPERTENSION), BENIGN: ICD-10-CM

## 2020-09-08 RX ORDER — HYDROCHLOROTHIAZIDE 25 MG/1
TABLET ORAL
Qty: 90 TAB | Refills: 0 | Status: SHIPPED | OUTPATIENT
Start: 2020-09-08 | End: 2020-10-16 | Stop reason: SDUPTHER

## 2020-09-08 NOTE — TELEPHONE ENCOUNTER
Requested Prescriptions     Signed Prescriptions Disp Refills    hydroCHLOROthiazide (HYDRODIURIL) 25 mg tablet 90 Tab 0     Sig: TAKE 1 TABLET BY MOUTH ONCE DAILY (keep follow up on 10-16-20)     Authorizing Provider: Linwood Mclaughlin     Ordering User: Lillie Newberry     Verbal order per Dr. Festus Gonzalez.     Future Appointments   Date Time Provider Boby Nickerson   10/16/2020  1:20 PM [facility]ges [facility]ins[facility]   24/84/0714  3:25 AM Nicole Bob MD [facility] BS AMB

## 2020-09-16 DIAGNOSIS — K21.9 GASTROESOPHAGEAL REFLUX DISEASE WITHOUT ESOPHAGITIS: ICD-10-CM

## 2020-09-16 DIAGNOSIS — E66.01 MORBID OBESITY, UNSPECIFIED OBESITY TYPE (HCC): ICD-10-CM

## 2020-09-16 DIAGNOSIS — I10 HTN (HYPERTENSION), BENIGN: ICD-10-CM

## 2020-09-16 DIAGNOSIS — I25.10 ATHEROSCLEROSIS OF NATIVE CORONARY ARTERY OF NATIVE HEART WITHOUT ANGINA PECTORIS: ICD-10-CM

## 2020-09-16 DIAGNOSIS — E78.2 MIXED HYPERLIPIDEMIA: ICD-10-CM

## 2020-09-16 DIAGNOSIS — I35.0 AORTIC VALVE STENOSIS, UNSPECIFIED ETIOLOGY: ICD-10-CM

## 2020-09-16 DIAGNOSIS — I48.91 ATRIAL FIBRILLATION, UNSPECIFIED TYPE (HCC): ICD-10-CM

## 2020-09-16 RX ORDER — NEBIVOLOL 10 MG/1
10 TABLET ORAL DAILY
Qty: 90 TAB | Refills: 0 | Status: SHIPPED | OUTPATIENT
Start: 2020-09-16 | End: 2020-10-16 | Stop reason: SDUPTHER

## 2020-09-16 NOTE — TELEPHONE ENCOUNTER
Requested Prescriptions     Signed Prescriptions Disp Refills    nebivoloL (Bystolic) 10 mg tablet 90 Tab 0     Sig: Take 1 Tab by mouth daily. Authorizing Provider: Seymour Roberson     Ordering User: Chema Foster     Verbal order per Dr. Yecenia Go.     Future Appointments   Date Time Provider Boby Nickerson   10/16/2020  1:20 PM MD ZARA Walsh BS AMB   84/13/1002  0:10 AM Yelitza Ash MD [facility] BS AMB

## 2020-09-27 DIAGNOSIS — E78.2 MIXED HYPERLIPIDEMIA: ICD-10-CM

## 2020-09-27 DIAGNOSIS — I48.91 ATRIAL FIBRILLATION, UNSPECIFIED TYPE (HCC): ICD-10-CM

## 2020-09-27 DIAGNOSIS — I25.10 ATHEROSCLEROSIS OF NATIVE CORONARY ARTERY OF NATIVE HEART WITHOUT ANGINA PECTORIS: ICD-10-CM

## 2020-09-27 DIAGNOSIS — I35.0 AORTIC VALVE STENOSIS, UNSPECIFIED ETIOLOGY: ICD-10-CM

## 2020-09-27 DIAGNOSIS — I10 HTN (HYPERTENSION), BENIGN: ICD-10-CM

## 2020-09-27 DIAGNOSIS — E66.01 MORBID OBESITY, UNSPECIFIED OBESITY TYPE (HCC): ICD-10-CM

## 2020-09-27 DIAGNOSIS — K21.9 GASTROESOPHAGEAL REFLUX DISEASE WITHOUT ESOPHAGITIS: ICD-10-CM

## 2020-09-28 PROBLEM — Z79.4 TYPE 2 DIABETES MELLITUS WITH BOTH EYES AFFECTED BY MODERATE NONPROLIFERATIVE RETINOPATHY WITHOUT MACULAR EDEMA, WITH LONG-TERM CURRENT USE OF INSULIN (HCC): Status: ACTIVE | Noted: 2020-09-28

## 2020-09-28 PROBLEM — E11.3393 TYPE 2 DIABETES MELLITUS WITH BOTH EYES AFFECTED BY MODERATE NONPROLIFERATIVE RETINOPATHY WITHOUT MACULAR EDEMA, WITH LONG-TERM CURRENT USE OF INSULIN (HCC): Status: ACTIVE | Noted: 2020-09-28

## 2020-09-28 RX ORDER — DILTIAZEM HYDROCHLORIDE 240 MG/1
CAPSULE, COATED, EXTENDED RELEASE ORAL
Qty: 90 CAP | Refills: 0 | Status: SHIPPED | OUTPATIENT
Start: 2020-09-28 | End: 2020-10-16 | Stop reason: SDUPTHER

## 2020-09-28 RX ORDER — ICOSAPENT ETHYL 1000 MG/1
2 CAPSULE ORAL 2 TIMES DAILY WITH MEALS
Qty: 360 CAP | Refills: 0 | Status: SHIPPED | OUTPATIENT
Start: 2020-09-28 | End: 2020-10-16 | Stop reason: SDUPTHER

## 2020-09-28 RX ORDER — ROSUVASTATIN CALCIUM 40 MG/1
40 TABLET, COATED ORAL DAILY
Qty: 90 TAB | Refills: 0 | Status: SHIPPED | OUTPATIENT
Start: 2020-09-28 | End: 2020-10-16 | Stop reason: SDUPTHER

## 2020-09-28 RX ORDER — FENOFIBRATE 145 MG/1
145 TABLET, COATED ORAL DAILY
Qty: 90 TAB | Refills: 0 | Status: SHIPPED | OUTPATIENT
Start: 2020-09-28 | End: 2020-10-16 | Stop reason: SDUPTHER

## 2020-09-28 NOTE — TELEPHONE ENCOUNTER
Requested Prescriptions     Signed Prescriptions Disp Refills    dilTIAZem ER (Cartia XT) 240 mg capsule 90 Cap 0     Sig: TAKE 1 CAPSULE BY MOUTH ONCE DAILY  Keep follow up on 10-16-20 for further refills. Authorizing Provider: Hoda Puentes     Ordering User: Bella He    fenofibrate nanocrystallized (TRICOR) 145 mg tablet 90 Tab 0     Sig: Take 1 Tab by mouth daily. Keep follow up on 10-16-20 for further refills. Authorizing Provider: Hoda Puentes     Ordering User: Bella He    rosuvastatin (CRESTOR) 40 mg tablet 90 Tab 0     Sig: Take 1 Tab by mouth daily. Keep follow up on 10-16-20 for further refills. Authorizing Provider: Hoda Puentes     Ordering User: Bella He     Verbal order per Dr. Araceli Pryor.     Future Appointments   Date Time Provider Boby Nickerson   10/16/2020  1:20 PM MD ZARA Post AMB   61/25/7261  3:17 AM Estephania Lopez MD [facility] BS AMB

## 2020-10-16 ENCOUNTER — OFFICE VISIT (OUTPATIENT)
Dept: CARDIOLOGY CLINIC | Age: 55
End: 2020-10-16
Payer: COMMERCIAL

## 2020-10-16 VITALS
WEIGHT: 265 LBS | DIASTOLIC BLOOD PRESSURE: 68 MMHG | RESPIRATION RATE: 18 BRPM | HEIGHT: 70 IN | BODY MASS INDEX: 37.94 KG/M2 | HEART RATE: 79 BPM | OXYGEN SATURATION: 95 % | SYSTOLIC BLOOD PRESSURE: 128 MMHG

## 2020-10-16 DIAGNOSIS — I10 HTN (HYPERTENSION), BENIGN: ICD-10-CM

## 2020-10-16 DIAGNOSIS — K21.9 GASTROESOPHAGEAL REFLUX DISEASE WITHOUT ESOPHAGITIS: ICD-10-CM

## 2020-10-16 DIAGNOSIS — I48.91 ATRIAL FIBRILLATION, UNSPECIFIED TYPE (HCC): ICD-10-CM

## 2020-10-16 DIAGNOSIS — E66.01 MORBID OBESITY, UNSPECIFIED OBESITY TYPE (HCC): ICD-10-CM

## 2020-10-16 DIAGNOSIS — I35.0 AORTIC VALVE STENOSIS, UNSPECIFIED ETIOLOGY: ICD-10-CM

## 2020-10-16 DIAGNOSIS — I25.10 ATHEROSCLEROSIS OF NATIVE CORONARY ARTERY OF NATIVE HEART WITHOUT ANGINA PECTORIS: Primary | ICD-10-CM

## 2020-10-16 DIAGNOSIS — E78.2 MIXED HYPERLIPIDEMIA: ICD-10-CM

## 2020-10-16 DIAGNOSIS — Z01.812 PRE-PROCEDURE LAB EXAM: ICD-10-CM

## 2020-10-16 PROCEDURE — 99214 OFFICE O/P EST MOD 30 MIN: CPT | Performed by: SPECIALIST

## 2020-10-16 PROCEDURE — 93000 ELECTROCARDIOGRAM COMPLETE: CPT | Performed by: SPECIALIST

## 2020-10-16 RX ORDER — CLOPIDOGREL BISULFATE 75 MG/1
TABLET ORAL
Qty: 90 TAB | Refills: 1 | Status: SHIPPED | OUTPATIENT
Start: 2020-10-16 | End: 2021-07-07

## 2020-10-16 RX ORDER — FOSINOPRIL SODIUM 40 MG/1
TABLET ORAL
Qty: 90 TAB | Refills: 1 | Status: SHIPPED | OUTPATIENT
Start: 2020-10-16 | End: 2021-07-07

## 2020-10-16 RX ORDER — FENOFIBRATE 145 MG/1
145 TABLET, COATED ORAL DAILY
Qty: 90 TAB | Refills: 1 | Status: SHIPPED | OUTPATIENT
Start: 2020-10-16 | End: 2021-07-07

## 2020-10-16 RX ORDER — HYDROCHLOROTHIAZIDE 25 MG/1
TABLET ORAL
Qty: 90 TAB | Refills: 1 | Status: SHIPPED | OUTPATIENT
Start: 2020-10-16 | End: 2021-04-29

## 2020-10-16 RX ORDER — ROSUVASTATIN CALCIUM 40 MG/1
40 TABLET, COATED ORAL DAILY
Qty: 90 TAB | Refills: 1 | Status: SHIPPED | OUTPATIENT
Start: 2020-10-16 | End: 2021-07-07

## 2020-10-16 RX ORDER — NEBIVOLOL 10 MG/1
10 TABLET ORAL DAILY
Qty: 90 TAB | Refills: 1 | Status: SHIPPED | OUTPATIENT
Start: 2020-10-16 | End: 2021-04-29

## 2020-10-16 RX ORDER — ICOSAPENT ETHYL 1000 MG/1
2 CAPSULE ORAL 2 TIMES DAILY WITH MEALS
Qty: 360 CAP | Refills: 1 | Status: SHIPPED | OUTPATIENT
Start: 2020-10-16 | End: 2021-04-29

## 2020-10-16 RX ORDER — DILTIAZEM HYDROCHLORIDE 240 MG/1
CAPSULE, COATED, EXTENDED RELEASE ORAL
Qty: 90 CAP | Refills: 1 | Status: SHIPPED | OUTPATIENT
Start: 2020-10-16 | End: 2021-07-07

## 2020-10-16 NOTE — PROGRESS NOTES
Chief Complaint   Patient presents with    Follow-up     refills         1. Have you been to the ER, urgent care clinic since your last visit? Yes [facility] feel off fire truck. Hospitalized since your last visit? Yes over night    2. Have you seen or consulted any other health care providers outside of the [address] [place] [address] since your last visit? Yes orthopedic doctor for arthritis in right knee  Include any pap smears or colon screening.   no

## 2020-10-16 NOTE — PATIENT INSTRUCTIONS
You will be scheduled for an exercise nuclear stress test and echocardiogram  after your appointment today. Wear comfortable clothing (shorts or pants with a shirt or blouse- no underwire bras) and walking or athletic shoes. Do not eat or drink anything, except water, for at least 2 hours prior to your appointment. Avoid tobacco products for at least 6 hours prior to your test. 
 
Do not eat or drink anything containing caffeine, including but not limited to the following: chocolate, regular and decaffeinated coffee, soft drinks, or tea for at least 24 hours prior to your test. 
 
Do hold your bystolic for 24 hours  prior to your test. If you are a diabetic, please ask your physician how to adjust your food and insulin prior to your test.  
 
Please bring all medications you are currently taking. Your test will be performed on a 1 day protocol. This is determined by your height, weight, and other risk factors. For a 1 day test, please allow for 4 hours The radioactive isotope used for your testing is different from any of the dyes that are commonly used in x-ray procedures, and is ordered specially for your test. Please call to cancel or reschedule your appointment at least 24 hours prior to your scheduled appointment to avoid being billed for the expensive isotope. You will be required to be tested for covid 19, 3 days prior to test.  This can be done at the [facility] location. Once tested, please self quarantine. You are required to wear a mask during test.   
 
Follow up with Dr. Noemi Espinosa after the above.

## 2020-11-09 ENCOUNTER — HOSPITAL ENCOUNTER (OUTPATIENT)
Dept: PREADMISSION TESTING | Age: 55
Discharge: HOME | End: 2020-11-09
Payer: COMMERCIAL

## 2020-11-09 ENCOUNTER — TRANSCRIBE ORDER (OUTPATIENT)
Dept: REGISTRATION | Age: 55
End: 2020-11-09

## 2020-11-09 DIAGNOSIS — Z01.812 PRE-PROCEDURE LAB EXAM: Primary | ICD-10-CM

## 2020-11-09 DIAGNOSIS — Z01.812 PRE-PROCEDURE LAB EXAM: ICD-10-CM

## 2020-11-09 PROCEDURE — 87635 SARS-COV-2 COVID-19 AMP PRB: CPT

## 2020-11-11 LAB — SARS-COV-2, COV2NT: NOT DETECTED

## 2020-11-19 ENCOUNTER — TELEPHONE (OUTPATIENT)
Dept: INTERNAL MEDICINE CLINIC | Age: 55
End: 2020-11-19

## 2020-11-19 ENCOUNTER — NURSE TRIAGE (OUTPATIENT)
Dept: OTHER | Facility: CLINIC | Age: 55
End: 2020-11-19

## 2020-11-19 ENCOUNTER — TRANSCRIBE ORDER (OUTPATIENT)
Dept: INTERNAL MEDICINE CLINIC | Age: 55
End: 2020-11-19

## 2020-11-19 NOTE — TELEPHONE ENCOUNTER
Reason for Disposition   [1] COVID-19 EXPOSURE (Close Contact) AND [2] within last 14 days BUT [3] NO symptoms    Answer Assessment - Initial Assessment Questions  1. 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 PLACE of CONTACT: \"Where were you when you were exposed to COVID-19? \" (e.g., home, school, medical waiting room; which city?)        At work     3. TYPE of CONTACT: \"How much contact was there? \" (e.g., sitting next to, live in same house, work in same office, same building)      Transporting pt on EMS     4. DURATION of CONTACT: \"How long were you in contact with the COVID-19 patient? \" (e.g., a few seconds, passed by person, a few minutes, live with the patient)      10 min     5. DATE of CONTACT: \"When did you have contact with a COVID-19 patient? \" (e.g., how many days ago)      11/17/2020    6. TRAVEL: \"Have you traveled out of the country recently? \" If so, \"When and where? \"      * Also ask about out-of-state travel, since the CDC has identified some high-risk cities for community spread in the [address] [place] [address]. * Note: Travel becomes less relevant if there is widespread community transmission where the patient lives. Denies    7. COMMUNITY SPREAD: \"Are there lots of cases of COVID-19 (community spread) where you live? \" (See public health department website, if unsure)          8. SYMPTOMS: \"Do you have any symptoms? \" (e.g., fever, cough, breathing difficulty)      Denies any at this time     9. PREGNANCY OR POSTPARTUM: \"Is there any chance you are pregnant? \" \"When was your last menstrual period? \" \"Did you deliver in the last 2 weeks? \"       NA     10. HIGH RISK: \"Do you have any heart or lung problems? Do you have a weak immune system? 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 Triaged as documented, care advice given, dispo given and pt verbalized understanding and is agreeable to plan. Warm transfer to [facility] for further assistance and speak to office for their recommendation on coming into the office tomorrow.

## 2020-11-19 NOTE — TELEPHONE ENCOUNTER
----- Message from Katerin Woodall sent at 11/19/2020 11:28 AM EST -----  Regarding: Dr Rich Luo: [phone]  General Message/Vendor Calls    Caller's first and last name:Clarke Bennett      Reason for call:pt was exposed to a COVID-19 pt on Tuesday and wants to know if he should come in for his appt on tomorrow at 8:00 am.      Callback required yes/no and why:      Best contact number(s):[phone]      Details to clarify the request:please leave message. Pt is a .       Katerin Woodall

## 2020-11-19 NOTE — TELEPHONE ENCOUNTER
PSR called patient, advised CPE appt with PCP has been R/S due to Covid risk. Please return call to confirm date/time change okay.

## 2020-12-10 NOTE — PROGRESS NOTES
Assessment and Plan   Diagnoses and all orders for this visit:    1. Well adult exam  Up-to-date on vaccines and colonoscopy screening. Had an eye exam downstairs 2 months ago. Encourage healthy habits    2. Paroxysmal atrial fibrillation (HCC)  3. Coronary artery disease involving native coronary artery of native heart without angina pectoris  Status post ablation in 2015 and status post stents. Followed by cardiology. Continue current medications  Cardiology planning for stress and echo    4. HTN (hypertension), benign  Well-controlled. Continue current medications    5. Type 2 diabetes mellitus with stage 3 chronic kidney disease, with long-term current use of insulin, unspecified whether stage 3a or 3b CKD ([facility] [address] [address]  Followed by endocrinology and is currently on an insulin pump. Most recent A1c 7.4%. Continue per endocrinology  Followed by nephrology for CKD    6. Vitamin D deficiency  -     VITAMIN D, 25 HYDROXY; Future  Started on vitamin D supplements after last visit. We will recheck levels. Order provided so we can get labs done when he gets labs for other people    7. Hypogonadism in male  Followed by urology for testosterone replacement       8/28/2020 labs from endo  a1c 7.4%  Total cholesterol 131, triglycerides 163, HDL 38, LDL 60  glucose 103  BUN 28  Creatinine 1.91  Sodium 142  Potassium 4.3  Chloride 102  Bicarb 25  Calcium 10  Total protein 7.1  Albumin 4.5    Total bili 0.6  Alk phos 31  AST 34  ALT 34  TSH 0.424      Benefits, risks, possible drug interactions, and side effects of all new medications were reviewed with the patient. Pt verbalized understanding.     Return to clinic: 1 year for physical or earlier as needed    Sherri Henley MD  [facility]  12/11/2020    Future Appointments   Date Time Provider Boby Nickerson   77/65/7965  0:50 AM Deyanira Zaldivar MD [facility] BS AMB        Subjective   Chief Complaint   Physical    Everardo Hammond Katie Lowry is a 48 y.o. male         Review of Systems   Constitutional: Negative for chills and fever. HENT: Negative for hearing loss. Eyes: Negative for blurred vision. Respiratory: Negative for shortness of breath. Cardiovascular: Negative for chest pain. Gastrointestinal: Negative for abdominal pain, blood in stool, constipation, diarrhea, melena, nausea and vomiting. Genitourinary: Negative for dysuria and hematuria. Musculoskeletal: Negative for joint pain. Skin: Negative for rash. Neurological: Negative for headaches. Objective   Vitals:       Visit Vitals  /80 (BP 1 Location: Left arm, BP Patient Position: Sitting)   Pulse 80   Temp 97.9 °F (36.6 °C) (Oral)   Resp 16   Ht 5' 10\" (1.778 m)   Wt 271 lb (122.9 kg)   SpO2 94%   BMI 38.88 kg/m²        Physical Exam  Constitutional:       General: He is not in acute distress. Appearance: He is well-developed. HENT:      Right Ear: External ear normal. There is impacted cerumen. Left Ear: External ear normal. There is impacted cerumen. Eyes:      Extraocular Movements: Extraocular movements intact. Conjunctiva/sclera: Conjunctivae normal.   Neck:      Musculoskeletal: Neck supple. Cardiovascular:      Rate and Rhythm: Normal rate and regular rhythm. Pulses: Normal pulses. Heart sounds: Murmur (2/6 systolic murmur) present. No friction rub. No gallop. Pulmonary:      Effort: No respiratory distress. Breath sounds: No wheezing, rhonchi or rales. Abdominal:      General: Bowel sounds are normal. There is no distension. Palpations: Abdomen is soft. There is no hepatomegaly, splenomegaly or mass. Tenderness: There is no abdominal tenderness. There is no guarding. Skin:     General: Skin is warm. Findings: No rash. Neurological:      Mental Status: He is alert. Gait: Gait normal.   Psychiatric:         Mood and Affect: Mood normal.         Thought Content:  Thought content normal.         Judgment: Judgment normal.          Current Outpatient Medications   Medication Sig    Vitamin D3 50 mcg (2,000 unit) cap capsule TAKE 1 CAPSULE BY MOUTH ONCE DAILY    FreeStyle Almita 14 Day Sensor kit CHECK BLOOD SUGAR AS DIRECTED. CHANGE SENSOR EVERY 14 DAYS    V-GO 20 vinicio     clopidogreL (PLAVIX) 75 mg tab TAKE 1 TABLET BY MOUTH ONCE DAILY    icosapent ethyL (Vascepa) 1 gram capsule Take 2 Caps by mouth two (2) times daily (with meals).  dilTIAZem ER (Cartia XT) 240 mg capsule TAKE 1 CAPSULE BY MOUTH ONCE DAILY    fenofibrate nanocrystallized (TRICOR) 145 mg tablet Take 1 Tab by mouth daily.  nebivoloL (Bystolic) 10 mg tablet Take 1 Tab by mouth daily.  fosinopriL (MONOPRIL) 40 mg tablet TAKE 1 TABLET BY MOUTH ONCE DAILY    rosuvastatin (CRESTOR) 40 mg tablet Take 1 Tab by mouth daily.  hydroCHLOROthiazide (HYDRODIURIL) 25 mg tablet TAKE 1 TABLET BY MOUTH ONCE DAILY    sildenafil, antihypertensive, (REVATIO) 20 mg tablet TAKE UP TO 5 TABS BY MOUTH AS DIRECTED    OTHER humulog insulin 20 units/24 hours continuously via insulin pump.  OTHER humulog insulin sliding tid with meals.  semaglutide (OZEMPIC SC) by SubCUTAneous route every seven (7) days.  JARDIANCE 25 mg tablet Take 25 mg by mouth daily.  ONETOUCH ULTRA2 monitoring kit     pantoprazole (PROTONIX) 40 mg tablet Take 1 Tab by mouth daily.  ONETOUCH ULTRA TEST strip     metFORMIN (GLUCOPHAGE) 1,000 mg tablet TAKE ONE TABLET BY MOUTH TWICE DAILY WITH  MEALS    TESTOSTERONE IM by IntraMUSCular route. Every 10 weeks. Aveed    multivitamin (ONE A DAY) tablet Take 1 Tab by mouth daily. No current facility-administered medications for this visit.

## 2020-12-11 ENCOUNTER — OFFICE VISIT (OUTPATIENT)
Dept: INTERNAL MEDICINE CLINIC | Age: 55
End: 2020-12-11
Payer: COMMERCIAL

## 2020-12-11 VITALS
OXYGEN SATURATION: 94 % | RESPIRATION RATE: 16 BRPM | WEIGHT: 271 LBS | DIASTOLIC BLOOD PRESSURE: 80 MMHG | SYSTOLIC BLOOD PRESSURE: 127 MMHG | TEMPERATURE: 97.9 F | BODY MASS INDEX: 38.8 KG/M2 | HEART RATE: 80 BPM | HEIGHT: 70 IN

## 2020-12-11 DIAGNOSIS — I48.0 PAROXYSMAL ATRIAL FIBRILLATION (HCC): ICD-10-CM

## 2020-12-11 DIAGNOSIS — E11.22 TYPE 2 DIABETES MELLITUS WITH STAGE 3 CHRONIC KIDNEY DISEASE, WITH LONG-TERM CURRENT USE OF INSULIN, UNSPECIFIED WHETHER STAGE 3A OR 3B CKD (HCC): ICD-10-CM

## 2020-12-11 DIAGNOSIS — I25.10 CORONARY ARTERY DISEASE INVOLVING NATIVE CORONARY ARTERY OF NATIVE HEART WITHOUT ANGINA PECTORIS: ICD-10-CM

## 2020-12-11 DIAGNOSIS — E55.9 VITAMIN D DEFICIENCY: ICD-10-CM

## 2020-12-11 DIAGNOSIS — N18.30 TYPE 2 DIABETES MELLITUS WITH STAGE 3 CHRONIC KIDNEY DISEASE, WITH LONG-TERM CURRENT USE OF INSULIN, UNSPECIFIED WHETHER STAGE 3A OR 3B CKD (HCC): ICD-10-CM

## 2020-12-11 DIAGNOSIS — E29.1 HYPOGONADISM IN MALE: ICD-10-CM

## 2020-12-11 DIAGNOSIS — I10 HTN (HYPERTENSION), BENIGN: ICD-10-CM

## 2020-12-11 DIAGNOSIS — Z00.00 WELL ADULT EXAM: Primary | ICD-10-CM

## 2020-12-11 DIAGNOSIS — Z79.4 TYPE 2 DIABETES MELLITUS WITH STAGE 3 CHRONIC KIDNEY DISEASE, WITH LONG-TERM CURRENT USE OF INSULIN, UNSPECIFIED WHETHER STAGE 3A OR 3B CKD (HCC): ICD-10-CM

## 2020-12-11 PROCEDURE — 99396 PREV VISIT EST AGE 40-64: CPT | Performed by: INTERNAL MEDICINE

## 2020-12-11 RX ORDER — NICOTINE 11MG/24HR
PATCH, TRANSDERMAL 24 HOURS TRANSDERMAL
COMMUNITY
Start: 2020-11-15 | End: 2021-03-11

## 2020-12-11 RX ORDER — FLASH GLUCOSE SENSOR
KIT MISCELLANEOUS
COMMUNITY
Start: 2020-11-22 | End: 2022-02-16

## 2020-12-11 RX ORDER — SUB-Q INSULIN DEVICE, 40 UNIT
EACH MISCELLANEOUS
COMMUNITY
Start: 2020-11-30 | End: 2022-02-16

## 2021-01-21 ENCOUNTER — HOSPITAL ENCOUNTER (OUTPATIENT)
Dept: GENERAL RADIOLOGY | Age: 56
Discharge: HOME | End: 2021-01-21
Attending: NURSE PRACTITIONER
Payer: COMMERCIAL

## 2021-01-21 ENCOUNTER — HOSPITAL ENCOUNTER (OUTPATIENT)
Dept: VASCULAR SURGERY | Age: 56
Discharge: HOME | End: 2021-01-21
Attending: NURSE PRACTITIONER
Payer: COMMERCIAL

## 2021-01-21 ENCOUNTER — OFFICE VISIT (OUTPATIENT)
Dept: INTERNAL MEDICINE CLINIC | Age: 56
End: 2021-01-21
Attending: NURSE PRACTITIONER
Payer: COMMERCIAL

## 2021-01-21 VITALS
WEIGHT: 264 LBS | DIASTOLIC BLOOD PRESSURE: 81 MMHG | OXYGEN SATURATION: 95 % | HEIGHT: 70 IN | RESPIRATION RATE: 16 BRPM | TEMPERATURE: 98 F | BODY MASS INDEX: 37.8 KG/M2 | HEART RATE: 83 BPM | SYSTOLIC BLOOD PRESSURE: 137 MMHG

## 2021-01-21 DIAGNOSIS — I48.0 PAROXYSMAL ATRIAL FIBRILLATION (HCC): ICD-10-CM

## 2021-01-21 DIAGNOSIS — M70.52 PATELLAR BURSITIS OF LEFT KNEE: ICD-10-CM

## 2021-01-21 DIAGNOSIS — R60.0 EDEMA OF LEFT LOWER LEG: ICD-10-CM

## 2021-01-21 DIAGNOSIS — E11.21 TYPE 2 DIABETES WITH NEPHROPATHY (HCC): ICD-10-CM

## 2021-01-21 DIAGNOSIS — R60.0 EDEMA OF LEFT LOWER LEG: Primary | ICD-10-CM

## 2021-01-21 DIAGNOSIS — M70.52 INFRAPATELLAR BURSITIS OF LEFT KNEE: ICD-10-CM

## 2021-01-21 PROCEDURE — 73560 X-RAY EXAM OF KNEE 1 OR 2: CPT

## 2021-01-21 PROCEDURE — 99214 OFFICE O/P EST MOD 30 MIN: CPT | Performed by: NURSE PRACTITIONER

## 2021-01-21 PROCEDURE — 93971 EXTREMITY STUDY: CPT

## 2021-01-21 RX ORDER — MISOPROSTOL 200 UG/1
1 TABLET ORAL
COMMUNITY

## 2021-01-21 RX ORDER — DULOXETIN HYDROCHLORIDE 30 MG/1
1 CAPSULE, DELAYED RELEASE ORAL DAILY
COMMUNITY
Start: 2020-12-15

## 2021-01-21 NOTE — PROGRESS NOTES
Gerard Moore (: 1965) is a 48 y.o. male, established patient, here for evaluation of the following chief complaint(s):  Leg Swelling (2021 noticed a knot on knee - over time the left leg started swelling - doesn't hurt but is tender to the touch and brusing )       ASSESSMENT/PLAN:  1. Edema of left lower leg -- sent for stat venous doppler to ensure that he has not developed DVT in left leg.  -     DUPLEX LOWER EXT VENOUS LEFT; Future    2. Infrapatellar bursitis of left knee -- can use Neoprene sleeve for compression; keep leg elevated as much as possible. If symptoms worsen, can have him see Orthopedics for possible aspiration of fluid and cortisone injection into bursa. -     DUPLEX LOWER EXT VENOUS LEFT; Future  -     XR KNEE LT MAX 2 VWS; Future    3. Type 2 diabetes with nephropathy (HCC) -- stable on insulin pump    4. Paroxysmal atrial fibrillation (HCC) -- has been stable          SUBJECTIVE/OBJECTIVE:  HPI    Presents with complaints of sudden onset of lump to left knee that he noticed 4 days ago after kneeling down. Over the next few days, he noted initial area of swelling had decreased in size and edema and bruising forming to inside of left calf down to left ankle. Denies redness, warmth to area. Has mild tenderness to inside of left knee. Works as  and is currently on Plavix. Does not recall any known injury to left knee. Has not taken any OTC medications for current symptoms. Has significant arthritis in right knee and has had cortisone injections given by Dr Elie Elizondo with [address] [place] [address] which gave temporary relief. Has insulin pump that is managed by Endocrinology and reports his blood sugars have been stable. Most recent Hgb A1c 7.4%. Sees Nephrology for CKD Stage 3.     Patient Active Problem List   Diagnosis Code    Type 2 diabetes mellitus with stage 3 chronic kidney disease, with long-term current use of insulin ([facility]) E11.22, N18.30, Z79.4    HTN (hypertension), benign I10    Mixed hyperlipidemia E78.2    Coronary artery disease involving native coronary artery of native heart without angina pectoris I25.10    Gastroesophageal reflux disease without esophagitis K21.9    Class 2 severe obesity due to excess calories with serious comorbidity and body mass index (BMI) of 35.0 to 35.9 in adult ([facility]) E66.01, Z68.35    ZAK (obstructive sleep apnea) G47.33    Colon polyp K63.5    Paroxysmal atrial fibrillation ([facility]) I48.0    Other male erectile dysfunction N52.8    Hypogonadism in male E29.1    S/P ablation of atrial fibrillation Z98.890, Z86.79    Cholelithiasis without cholecystitis K80.20    Gastroparesis K31.84    Type 2 diabetes with nephropathy ([facility]) E11.21    CKD (chronic kidney disease) stage 3, GFR 30-59 ml/min ([facility]) N18.30    Arthritis M19.90    History of MI (myocardial infarction) I25.2    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 without macular edema, with long-term current use of insulin [facility] L59.4258, Z79.4     Past Surgical History:   Procedure Laterality Date    HX AFIB ABLATION      HX ANGIOPLASTY      HX ORTHOPAEDIC  2007    L Knee Arthroscopy    MT CARDIAC SURG PROCEDURE UNLIST      4 stents placed 2009,2011     Social History     Socioeconomic History    Marital status:      Spouse name: Not on file    Number of children: Not on file    Years of education: Not on file    Highest education level: Not on file   Occupational History    Not on file   Social Needs    Financial resource strain: Not on file    Food insecurity     Worry: Not on file     Inability: Not on file   [employer] needs     Medical: Not on file     Non-medical: Not on file   Tobacco Use    Smoking status: Never Smoker    Smokeless tobacco: Never Used   Substance and Sexual Activity    Alcohol use: No     Alcohol/week: 0.0 standard drinks    Drug use: No    Sexual activity: Yes Partners: Female     Birth control/protection: None   Lifestyle    Physical activity     Days per week: Not on file     Minutes per session: Not on file    Stress: Not on file   Relationships    Social connections     Talks on phone: Not on file     Gets together: Not on file     Attends Restorationist service: Not on file     Active member of club or organization: Not on file     Attends meetings of clubs or organizations: Not on file     Relationship status: Not on file    Intimate partner violence     Fear of current or ex partner: Not on file     Emotionally abused: Not on file     Physically abused: Not on file     Forced sexual activity: Not on file   Other Topics Concern    Not on file   Social History Narrative    Not on file     Family History   Problem Relation Age of Onset    Arthritis-osteo Mother     Diabetes Mother     Elevated Lipids Mother     Heart Disease Mother     Hypertension Mother     Elevated Lipids Father     Heart Disease Father     Hypertension Father     Cancer Father         skin and angiosarcoma    Diabetes Maternal Grandmother     Hypertension Brother     Diabetes Brother     Hypertension Brother     Diabetes Brother     Hypertension Brother      Current Outpatient Medications   Medication Sig    DULoxetine (Cymbalta) 30 mg capsule Take 1 Cap by mouth daily.  miSOPROStoL (CYTOTEC) 200 mcg tablet Take 1 Tab by mouth daily.  Vitamin D3 50 mcg (2,000 unit) cap capsule TAKE 1 CAPSULE BY MOUTH ONCE DAILY    FreeStyle Almita 14 Day Sensor kit CHECK BLOOD SUGAR AS DIRECTED. CHANGE SENSOR EVERY 14 DAYS    V-GO 20 vinicio     clopidogreL (PLAVIX) 75 mg tab TAKE 1 TABLET BY MOUTH ONCE DAILY    icosapent ethyL (Vascepa) 1 gram capsule Take 2 Caps by mouth two (2) times daily (with meals).  dilTIAZem ER (Cartia XT) 240 mg capsule TAKE 1 CAPSULE BY MOUTH ONCE DAILY    fenofibrate nanocrystallized (TRICOR) 145 mg tablet Take 1 Tab by mouth daily.     nebivoloL (Bystolic) 10 mg tablet Take 1 Tab by mouth daily.  fosinopriL (MONOPRIL) 40 mg tablet TAKE 1 TABLET BY MOUTH ONCE DAILY    rosuvastatin (CRESTOR) 40 mg tablet Take 1 Tab by mouth daily.  hydroCHLOROthiazide (HYDRODIURIL) 25 mg tablet TAKE 1 TABLET BY MOUTH ONCE DAILY    sildenafil, antihypertensive, (REVATIO) 20 mg tablet TAKE UP TO 5 TABS BY MOUTH AS DIRECTED    OTHER humulog insulin 20 units/24 hours continuously via insulin pump.  OTHER humulog insulin sliding tid with meals.  semaglutide (OZEMPIC SC) by SubCUTAneous route every seven (7) days.  JARDIANCE 25 mg tablet Take 25 mg by mouth daily.  ONETOUCH ULTRA2 monitoring kit     pantoprazole (PROTONIX) 40 mg tablet Take 1 Tab by mouth daily.  ONETOUCH ULTRA TEST strip     metFORMIN (GLUCOPHAGE) 1,000 mg tablet TAKE ONE TABLET BY MOUTH TWICE DAILY WITH  MEALS    TESTOSTERONE IM by IntraMUSCular route. Every 10 weeks. Aveed    multivitamin (ONE A DAY) tablet Take 1 Tab by mouth daily. No current facility-administered medications for this visit. Allergies   Allergen Reactions    Cleocin [Clindamycin Hcl] Rash     Immunization History   Administered Date(s) Administered    Influenza Vaccine 09/28/2014, 09/12/2015, 10/01/2016, 10/22/2017, 12/02/2018, 09/25/2019, 10/01/2020    Pneumococcal Polysaccharide (PPSV-23) 09/01/2016    Pneumococcal Vaccine (Pcv) 03/10/2009    TDAP Vaccine 09/16/2008    Tdap 12/14/2016    Zoster Recombinant 02/01/2019, 08/11/2019         Review of Systems   Constitutional: Negative for activity change and fatigue. Respiratory: Negative for chest tightness and shortness of breath. Cardiovascular: Negative for chest pain. Musculoskeletal: Positive for joint swelling (left lower leg). Skin: Positive for color change.        /81 (BP 1 Location: Left arm, BP Patient Position: Sitting)   Pulse 83   Temp 98 °F (36.7 °C) (Oral)   Resp 16   Ht 5' 10\" (1.778 m)   Wt 264 lb (119.7 kg)   SpO2 95% BMI 37.88 kg/m²   Physical Exam  Vitals signs and nursing note reviewed. Constitutional:       Appearance: Normal appearance. He is obese. Neck:      Musculoskeletal: Normal range of motion and neck supple. Cardiovascular:      Rate and Rhythm: Normal rate and regular rhythm. Pulses: Normal pulses. Heart sounds: Murmur present. Pulmonary:      Effort: Pulmonary effort is normal.      Breath sounds: Normal breath sounds. No wheezing or rhonchi. Musculoskeletal:         General: Swelling and tenderness present. Left lower leg: Edema present. Legs:       Comments: Effusion distal to left patella; ecchymosis to left medial knee and medial calf to ankle. 2+ nonpitting edema left knee to left ankle. Mild tenderness to left medial knee    Skin:     Findings: Bruising present. Neurological:      General: No focal deficit present. Mental Status: He is alert and oriented to person, place, and time. On this date 01/21/21 I have spent 30 minutes reviewing previous notes, test results and face to face with the patient discussing the diagnosis and importance of compliance with the treatment plan as well as documenting on the day of the visit. An electronic signature was used to authenticate this note.   -- Dante Apodaca NP

## 2021-01-21 NOTE — PATIENT INSTRUCTIONS
Knee (Pes Anserine) Bursitis: Exercises Introduction Here are some examples of exercises for you to try. The exercises may be suggested for a condition or for rehabilitation. Start each exercise slowly. Ease off the exercises if you start to have pain. You will be told when to start these exercises and which ones will work best for you. How to do the exercises Heel slide 1. Lie on your back with your affected knee straight. Your good knee should be bent. 2. Bend your affected knee by sliding your heel across the floor and toward your buttock until you feel a gentle stretch in your knee. 3. Hold for about 6 seconds, and then slowly straighten your knee. 4. Repeat 8 to 12 times. Branchly 1. Sit with your affected leg straight and supported on the floor or a firm bed. Place a small, rolled-up towel under your affected knee. Your other leg should be bent, with that foot flat on the floor. 2. Tighten the thigh muscles of your affected leg by pressing the back of your knee down into the towel. 3. Hold for about 6 seconds, then rest for up to 10 seconds. 4. Repeat 8 to 12 times. Straight-leg raises to the front 1. Lie on your back with your good knee bent so that your foot rests flat on the floor. Your affected leg should be straight. Make sure that your low back has a normal curve. You should be able to slip your hand in between the floor and the small of your back, with your palm touching the floor and your back touching the back of your hand. 2. Tighten the thigh muscles in your affected leg by pressing the back of your knee flat down to the floor. Hold your knee straight. 3. Keeping the thigh muscles tight and your leg straight, lift your affected leg up so that your heel is about 12 inches off the floor. 4. Hold for about 6 seconds, then lower slowly. Rest for up to 10 seconds between repetitions. 5. Repeat 8 to 12 times. Follow-up care is a key part of your treatment and safety. Be sure to make and go to all appointments, and call your doctor if you are having problems. It's also a good idea to know your test results and keep a list of the medicines you take. Where can you learn more? Go to http://www.gray.com/ Enter S881 in the search box to learn more about \"Knee (Pes Anserine) Bursitis: Exercises. \" Current as of: March 2, 2020               Content Version: 12.6 © 1209-3368 [employer]. Care instructions adapted under license by [employer] (which disclaims liability or warranty for this information). If you have questions about a medical condition or this instruction, always ask your healthcare professional. [address] any warranty or liability for your use of this information.

## 2021-01-22 ENCOUNTER — TELEPHONE (OUTPATIENT)
Dept: INTERNAL MEDICINE CLINIC | Age: 56
End: 2021-01-22

## 2021-01-22 NOTE — TELEPHONE ENCOUNTER
Called patient with results of left knee xray and venous doppler. Will obtain Neoprene sleeve for compression and keep leg elevated. If symptoms worsen, will have him see Orthopedics.

## 2021-03-09 LAB — HBA1C MFR BLD HPLC: 7.3 %

## 2021-03-11 RX ORDER — NICOTINE 11MG/24HR
PATCH, TRANSDERMAL 24 HOURS TRANSDERMAL
Qty: 90 CAP | Refills: 3 | Status: SHIPPED | OUTPATIENT
Start: 2021-03-11

## 2021-03-17 LAB — 25(OH)D3+25(OH)D2 SERPL-MCNC: 28.5 NG/ML (ref 30–100)

## 2021-03-19 NOTE — PROGRESS NOTES
Addendum   03/19/21   [employer] message sent. Your vitamin D level is improving. It is still just a little bit low. I recommend continuing your vitamin D supplement.

## 2021-03-19 NOTE — PROGRESS NOTES
[employer] message sent. Your vitamin D level is improving. It is still just a little bit low. I recommend continuing your vitamin D supplement.

## 2021-04-28 DIAGNOSIS — I35.0 AORTIC VALVE STENOSIS, UNSPECIFIED ETIOLOGY: ICD-10-CM

## 2021-04-28 DIAGNOSIS — E78.2 MIXED HYPERLIPIDEMIA: ICD-10-CM

## 2021-04-28 DIAGNOSIS — I25.10 ATHEROSCLEROSIS OF NATIVE CORONARY ARTERY OF NATIVE HEART WITHOUT ANGINA PECTORIS: ICD-10-CM

## 2021-04-28 DIAGNOSIS — I10 HTN (HYPERTENSION), BENIGN: ICD-10-CM

## 2021-04-28 DIAGNOSIS — K21.9 GASTROESOPHAGEAL REFLUX DISEASE WITHOUT ESOPHAGITIS: ICD-10-CM

## 2021-04-28 DIAGNOSIS — E66.01 MORBID OBESITY, UNSPECIFIED OBESITY TYPE (HCC): ICD-10-CM

## 2021-04-28 DIAGNOSIS — I48.91 ATRIAL FIBRILLATION, UNSPECIFIED TYPE (HCC): ICD-10-CM

## 2021-04-29 RX ORDER — NEBIVOLOL HYDROCHLORIDE 10 MG/1
TABLET ORAL
Qty: 90 TAB | Refills: 0 | Status: SHIPPED | OUTPATIENT
Start: 2021-04-29 | End: 2021-07-07

## 2021-04-29 RX ORDER — HYDROCHLOROTHIAZIDE 25 MG/1
TABLET ORAL
Qty: 90 TAB | Refills: 0 | Status: SHIPPED | OUTPATIENT
Start: 2021-04-29 | End: 2021-07-07

## 2021-04-29 RX ORDER — ICOSAPENT ETHYL 1000 MG/1
CAPSULE ORAL
Qty: 360 CAP | Refills: 0 | Status: SHIPPED | OUTPATIENT
Start: 2021-04-29 | End: 2021-07-07

## 2021-04-29 NOTE — TELEPHONE ENCOUNTER
Cardiologist: Dr. Genaro Mendez    Last appt: 10/16/2020  Future Appointments   Date Time Provider Boby Nickerson   57/38/1283  8:69 AM Rehan Pérez MD [facility] BS AMB       Requested Prescriptions     Signed Prescriptions Disp Refills    Bystolic 10 mg tablet 90 Tab 0     Sig: Take 1 tablet by mouth once daily     Authorizing Provider: Gudelia Ventura     Ordering User: VIVEK SHEPPARD    Vascepa 1 gram capsule 360 Cap 0     Sig: TAKE 2 CAPSULES BY MOUTH TWICE DAILY WITH MEALS     Authorizing Provider: Gudelia Ventura     Ordering User: VIVEK SHEPPARD    hydroCHLOROthiazide (HYDRODIURIL) 25 mg tablet 90 Tab 0     Sig: Take 1 tablet by mouth once daily     Authorizing Provider: Gudelia Ventura     Ordering User: VIVEK SHEPPARD         Refills VO per Dr. Genaro Mendez.

## 2021-06-10 LAB — HBA1C MFR BLD HPLC: 7.4 %

## 2021-07-06 DIAGNOSIS — E78.2 MIXED HYPERLIPIDEMIA: ICD-10-CM

## 2021-07-06 DIAGNOSIS — I48.91 ATRIAL FIBRILLATION, UNSPECIFIED TYPE (HCC): ICD-10-CM

## 2021-07-06 DIAGNOSIS — I35.0 AORTIC VALVE STENOSIS, UNSPECIFIED ETIOLOGY: ICD-10-CM

## 2021-07-06 DIAGNOSIS — I10 HTN (HYPERTENSION), BENIGN: ICD-10-CM

## 2021-07-06 DIAGNOSIS — I25.10 ATHEROSCLEROSIS OF NATIVE CORONARY ARTERY OF NATIVE HEART WITHOUT ANGINA PECTORIS: ICD-10-CM

## 2021-07-06 DIAGNOSIS — K21.9 GASTROESOPHAGEAL REFLUX DISEASE WITHOUT ESOPHAGITIS: ICD-10-CM

## 2021-07-06 DIAGNOSIS — E66.01 MORBID OBESITY, UNSPECIFIED OBESITY TYPE (HCC): ICD-10-CM

## 2021-07-07 RX ORDER — DILTIAZEM HYDROCHLORIDE 240 MG/1
CAPSULE, COATED, EXTENDED RELEASE ORAL
Qty: 90 CAPSULE | Refills: 0 | Status: SHIPPED | OUTPATIENT
Start: 2021-07-07 | End: 2021-09-08

## 2021-07-07 RX ORDER — CLOPIDOGREL BISULFATE 75 MG/1
TABLET ORAL
Qty: 90 TABLET | Refills: 0 | Status: SHIPPED | OUTPATIENT
Start: 2021-07-07 | End: 2021-09-08

## 2021-07-07 RX ORDER — FENOFIBRATE 145 MG/1
TABLET, COATED ORAL
Qty: 90 TABLET | Refills: 0 | Status: SHIPPED | OUTPATIENT
Start: 2021-07-07 | End: 2021-09-08

## 2021-07-07 RX ORDER — ROSUVASTATIN CALCIUM 40 MG/1
TABLET, COATED ORAL
Qty: 90 TABLET | Refills: 0 | Status: SHIPPED | OUTPATIENT
Start: 2021-07-07 | End: 2021-09-08

## 2021-07-07 RX ORDER — NEBIVOLOL HYDROCHLORIDE 10 MG/1
TABLET ORAL
Qty: 90 TABLET | Refills: 0 | Status: SHIPPED | OUTPATIENT
Start: 2021-07-07 | End: 2021-09-08

## 2021-07-07 RX ORDER — HYDROCHLOROTHIAZIDE 25 MG/1
TABLET ORAL
Qty: 90 TABLET | Refills: 0 | Status: SHIPPED | OUTPATIENT
Start: 2021-07-07 | End: 2021-09-08

## 2021-07-07 RX ORDER — ICOSAPENT ETHYL 1000 MG/1
CAPSULE ORAL
Qty: 360 CAPSULE | Refills: 0 | Status: SHIPPED | OUTPATIENT
Start: 2021-07-07 | End: 2021-09-08

## 2021-07-07 RX ORDER — FOSINOPRIL SODIUM 40 MG/1
TABLET ORAL
Qty: 90 TABLET | Refills: 0 | Status: SHIPPED | OUTPATIENT
Start: 2021-07-07 | End: 2021-09-08

## 2021-09-07 DIAGNOSIS — I48.91 ATRIAL FIBRILLATION, UNSPECIFIED TYPE (HCC): ICD-10-CM

## 2021-09-07 DIAGNOSIS — I10 HTN (HYPERTENSION), BENIGN: ICD-10-CM

## 2021-09-07 DIAGNOSIS — I35.0 AORTIC VALVE STENOSIS, UNSPECIFIED ETIOLOGY: ICD-10-CM

## 2021-09-07 DIAGNOSIS — K21.9 GASTROESOPHAGEAL REFLUX DISEASE WITHOUT ESOPHAGITIS: ICD-10-CM

## 2021-09-07 DIAGNOSIS — E66.01 MORBID OBESITY, UNSPECIFIED OBESITY TYPE (HCC): ICD-10-CM

## 2021-09-07 DIAGNOSIS — I25.10 ATHEROSCLEROSIS OF NATIVE CORONARY ARTERY OF NATIVE HEART WITHOUT ANGINA PECTORIS: ICD-10-CM

## 2021-09-07 DIAGNOSIS — E78.2 MIXED HYPERLIPIDEMIA: ICD-10-CM

## 2021-09-08 RX ORDER — DILTIAZEM HYDROCHLORIDE 240 MG/1
CAPSULE, COATED, EXTENDED RELEASE ORAL
Qty: 30 CAPSULE | Refills: 0 | Status: SHIPPED | OUTPATIENT
Start: 2021-09-08 | End: 2021-11-11

## 2021-09-08 RX ORDER — FOSINOPRIL SODIUM 40 MG/1
TABLET ORAL
Qty: 30 TABLET | Refills: 0 | Status: SHIPPED | OUTPATIENT
Start: 2021-09-08 | End: 2021-11-11

## 2021-09-08 RX ORDER — ICOSAPENT ETHYL 1000 MG/1
CAPSULE ORAL
Qty: 120 CAPSULE | Refills: 0 | Status: SHIPPED | OUTPATIENT
Start: 2021-09-08 | End: 2022-03-08

## 2021-09-08 RX ORDER — ROSUVASTATIN CALCIUM 40 MG/1
TABLET, COATED ORAL
Qty: 30 TABLET | Refills: 0 | Status: SHIPPED | OUTPATIENT
Start: 2021-09-08 | End: 2021-11-11

## 2021-09-08 RX ORDER — FENOFIBRATE 145 MG/1
TABLET, COATED ORAL
Qty: 30 TABLET | Refills: 0 | Status: SHIPPED | OUTPATIENT
Start: 2021-09-08 | End: 2021-11-11

## 2021-09-08 RX ORDER — CLOPIDOGREL BISULFATE 75 MG/1
TABLET ORAL
Qty: 30 TABLET | Refills: 0 | Status: SHIPPED | OUTPATIENT
Start: 2021-09-08 | End: 2021-11-11

## 2021-09-08 RX ORDER — HYDROCHLOROTHIAZIDE 25 MG/1
TABLET ORAL
Qty: 30 TABLET | Refills: 0 | Status: SHIPPED | OUTPATIENT
Start: 2021-09-08 | End: 2021-11-11

## 2021-09-08 RX ORDER — NEBIVOLOL HYDROCHLORIDE 10 MG/1
TABLET ORAL
Qty: 30 TABLET | Refills: 0 | Status: SHIPPED | OUTPATIENT
Start: 2021-09-08 | End: 2021-11-11

## 2021-09-08 NOTE — TELEPHONE ENCOUNTER
Cardiologist: Dr. Roslynn Najjar    Last appt: 10/16/2020  Future Appointments   Date Time Provider Boby Prescotti   91/32/1992  3:60 AM Greg Headley MD [facility] BS AMB       Requested Prescriptions     Signed Prescriptions Disp Refills    Bystolic 10 mg tablet 30 Tablet 0     Sig: TAKE 1 TABLET BY MOUTH ONCE DAILY -PT NEEDS APPOINTMENT     Authorizing Provider: Princess Calles     Ordering User: Caprice Chen clopidogreL (PLAVIX) 75 mg tab 30 Tablet 0     Sig: TAKE 1 TABLET BY MOUTH ONCE DAILY -PT NEEDS APPOINTMENT     Authorizing Provider: Princess Calles     Ordering User: SILVER VIVEK    dilTIAZem ER (CARDIZEM CD) 240 mg capsule 30 Capsule 0     Sig: TAKE 1 CAPSULE BY MOUTH ONCE DAILY **NEEDS APPOINTMENT**     Authorizing Provider: Princess Hartley User: SILVER VIVEK    fenofibrate nanocrystallized (TRICOR) 145 mg tablet 30 Tablet 0     Sig: TAKE 1 TABLET BY MOUTH ONCE DAILY - PT NEEDS APPOINTMENT     Authorizing Provider: Princess Calles     Ordering User: JENNIFFER SHEPPARDILY    fosinopriL (MONOPRIL) 40 mg tablet 30 Tablet 0     Sig: TAKE 1 TABLET BY MOUTH ONCE DAILY -PT NEEDS APPOINTMENT     Authorizing Provider: Princess Calles     Ordering User: SILVER VIVEK    hydroCHLOROthiazide (HYDRODIURIL) 25 mg tablet 30 Tablet 0     Sig: TAKE 1 TABLET BY MOUTH ONCE DAILY - PT NEEDS APPOINTMENT     Authorizing Provider: Princess Calles     Ordering User: SILVER VIVEK    rosuvastatin (CRESTOR) 40 mg tablet 30 Tablet 0     Sig: TAKE 1 TABLET BY MOUTH ONCE DAILY- PT NEEDS APPOINTMENT     Authorizing Provider: Princess Calles     Ordering User: JENNIFFER SHEPPARDILY    Vascepa 1 gram capsule 120 Capsule 0     Sig: TAKE 2 CAPSULES BY MOUTH TWICE DAILY WITH MEALS . APPOINTMENT REQUIRED FOR FUTURE REFILLS     Authorizing Provider: Princess Calles     Ordering User: VIVEK SHEPPARD         Refills VO per Dr. Roslynn Najjar.

## 2021-10-14 ENCOUNTER — TRANSCRIBE ORDER (OUTPATIENT)
Dept: SCHEDULING | Age: 56
End: 2021-10-14

## 2021-10-14 DIAGNOSIS — M17.11 PRIMARY LOCALIZED OSTEOARTHRITIS OF RIGHT KNEE: Primary | ICD-10-CM

## 2021-11-04 LAB — HBA1C MFR BLD HPLC: 8 %

## 2021-11-09 DIAGNOSIS — K21.9 GASTROESOPHAGEAL REFLUX DISEASE WITHOUT ESOPHAGITIS: ICD-10-CM

## 2021-11-09 DIAGNOSIS — E78.2 MIXED HYPERLIPIDEMIA: ICD-10-CM

## 2021-11-09 DIAGNOSIS — I35.0 AORTIC VALVE STENOSIS, UNSPECIFIED ETIOLOGY: ICD-10-CM

## 2021-11-09 DIAGNOSIS — I48.91 ATRIAL FIBRILLATION, UNSPECIFIED TYPE (HCC): ICD-10-CM

## 2021-11-09 DIAGNOSIS — I25.10 ATHEROSCLEROSIS OF NATIVE CORONARY ARTERY OF NATIVE HEART WITHOUT ANGINA PECTORIS: ICD-10-CM

## 2021-11-09 DIAGNOSIS — I10 HTN (HYPERTENSION), BENIGN: ICD-10-CM

## 2021-11-09 DIAGNOSIS — E66.01 MORBID OBESITY, UNSPECIFIED OBESITY TYPE (HCC): ICD-10-CM

## 2021-11-11 RX ORDER — ROSUVASTATIN CALCIUM 40 MG/1
TABLET, COATED ORAL
Qty: 30 TABLET | Refills: 0 | Status: SHIPPED | OUTPATIENT
Start: 2021-11-11 | End: 2022-01-03

## 2021-11-11 RX ORDER — FENOFIBRATE 145 MG/1
TABLET, COATED ORAL
Qty: 30 TABLET | Refills: 0 | Status: SHIPPED | OUTPATIENT
Start: 2021-11-11 | End: 2021-11-30

## 2021-11-11 RX ORDER — HYDROCHLOROTHIAZIDE 25 MG/1
TABLET ORAL
Qty: 30 TABLET | Refills: 0 | Status: SHIPPED | OUTPATIENT
Start: 2021-11-11 | End: 2022-01-03

## 2021-11-11 RX ORDER — CLOPIDOGREL BISULFATE 75 MG/1
TABLET ORAL
Qty: 30 TABLET | Refills: 0 | Status: SHIPPED | OUTPATIENT
Start: 2021-11-11 | End: 2022-01-03

## 2021-11-11 RX ORDER — NEBIVOLOL 10 MG/1
TABLET ORAL
Qty: 30 TABLET | Refills: 0 | Status: SHIPPED | OUTPATIENT
Start: 2021-11-11 | End: 2022-01-03

## 2021-11-11 RX ORDER — DILTIAZEM HYDROCHLORIDE 240 MG/1
CAPSULE, COATED, EXTENDED RELEASE ORAL
Qty: 30 CAPSULE | Refills: 0 | Status: SHIPPED | OUTPATIENT
Start: 2021-11-11 | End: 2022-01-03

## 2021-11-11 RX ORDER — FOSINOPRIL SODIUM 40 MG/1
TABLET ORAL
Qty: 30 TABLET | Refills: 0 | Status: SHIPPED | OUTPATIENT
Start: 2021-11-11 | End: 2022-01-03

## 2021-11-11 NOTE — TELEPHONE ENCOUNTER
Cardiologist: Dr. Chong Dudley    Last appt: 10/16/2020  Future Appointments   Date Time Provider Boby Nickerson   11/23/2021  8:00 AM TRANG CASTRO BS AMB   11/23/2021  9:00 AM TRANG GUTIERREZ BS AMB   11/30/2021  9:00 AM Gabrielle Roman, [address] [facility] BS AMB   72/89/0635  4:09 AM Dano Guerra MD [facility] BS AMB   2/3/2022  9:30 AM [facility] [address] [facility]       Requested Prescriptions     Signed Prescriptions Disp Refills    fenofibrate nanocrystallized (TRICOR) 145 mg tablet 30 Tablet 0     Sig: TAKE 1 TABLET BY MOUTH ONCE DAILY PATIENT NEEDS  APPOINTMENT     Authorizing Provider: [facility]     Ordering User: Mikie Reed clopidogreL (PLAVIX) 75 mg tab 30 Tablet 0     Sig: Take 1 tablet by mouth once daily     Authorizing Provider: [facility]     Ordering User: VIVEK SHEPPARD    nebivoloL (BYSTOLIC) 10 mg tablet 30 Tablet 0     Sig: TAKE 1 TABLET BY MOUTH ONCE DAILY PATIENT NEEDS  APPOINTMENT     Authorizing Provider: [facility]     Ordering User: VIVEK SHEPPARD    fosinopriL (MONOPRIL) 40 mg tablet 30 Tablet 0     Sig: TAKE 1 TABLET BY MOUTH ONCE DAILY - PATIENT NEEDS APPOINTMENT     Authorizing Provider: [facility]     Ordering User: VIVEK SHEPPARD    hydroCHLOROthiazide (HYDRODIURIL) 25 mg tablet 30 Tablet 0     Sig: TAKE 1 TABLET BY MOUTH ONCE DAILY PT  NEEDS  APPOINTMENT     Authorizing Provider: [facility]     Ordering User: VIVEK SHEPPARD    dilTIAZem ER (CARDIZEM CD) 240 mg capsule 30 Capsule 0     Sig: TAKE 1 CAPSULE BY MOUTH ONCE DAILY NEEDS  APPOINTMENT     Authorizing Provider: [facility]     Ordering User: VIVEK SHEPPARD    rosuvastatin (CRESTOR) 40 mg tablet 30 Tablet 0     Sig: TAKE 1 TABLET BY MOUTH ONCE DAILY -  PATIENT  NEEDS  APPOINTMENT     Authorizing Provider: [facility]     Ordering User: VIVEK SHEPPARD         Refills VO per Dr. Chong Dudley.

## 2021-11-18 ENCOUNTER — TELEPHONE (OUTPATIENT)
Dept: CARDIOLOGY CLINIC | Age: 56
End: 2021-11-18

## 2021-11-18 NOTE — TELEPHONE ENCOUNTER
Patient called to confirm what medications he should not take before 11/23/21 Nuclear Test.     Phone: [phone]

## 2021-11-18 NOTE — TELEPHONE ENCOUNTER
TC to pt, ID verified. Advised pt of instructions for Nuclear stress test. Advised to hold Bystolic 24 hours prior to testing. No further questions.

## 2021-11-23 ENCOUNTER — ANCILLARY PROCEDURE (OUTPATIENT)
Dept: CARDIOLOGY CLINIC | Age: 56
End: 2021-11-23

## 2021-11-23 ENCOUNTER — ANCILLARY PROCEDURE (OUTPATIENT)
Dept: CARDIOLOGY CLINIC | Age: 56
End: 2021-11-23
Payer: COMMERCIAL

## 2021-11-23 VITALS — HEIGHT: 70 IN | WEIGHT: 267 LBS | BODY MASS INDEX: 38.22 KG/M2

## 2021-11-23 VITALS
SYSTOLIC BLOOD PRESSURE: 122 MMHG | WEIGHT: 264 LBS | HEIGHT: 70 IN | BODY MASS INDEX: 37.8 KG/M2 | DIASTOLIC BLOOD PRESSURE: 76 MMHG

## 2021-11-23 DIAGNOSIS — I25.2 HISTORY OF MI (MYOCARDIAL INFARCTION): ICD-10-CM

## 2021-11-23 DIAGNOSIS — I25.10 CORONARY ARTERY DISEASE INVOLVING NATIVE HEART WITHOUT ANGINA PECTORIS, UNSPECIFIED VESSEL OR LESION TYPE: ICD-10-CM

## 2021-11-23 DIAGNOSIS — Z86.79 S/P ABLATION OF ATRIAL FIBRILLATION: ICD-10-CM

## 2021-11-23 DIAGNOSIS — Z98.890 S/P ABLATION OF ATRIAL FIBRILLATION: ICD-10-CM

## 2021-11-23 DIAGNOSIS — E66.01 CLASS 2 SEVERE OBESITY DUE TO EXCESS CALORIES WITH SERIOUS COMORBIDITY AND BODY MASS INDEX (BMI) OF 35.0 TO 35.9 IN ADULT (HCC): ICD-10-CM

## 2021-11-23 DIAGNOSIS — I10 HYPERTENSION, UNSPECIFIED TYPE: ICD-10-CM

## 2021-11-23 DIAGNOSIS — I35.0 AORTIC VALVE STENOSIS, ETIOLOGY OF CARDIAC VALVE DISEASE UNSPECIFIED: ICD-10-CM

## 2021-11-23 DIAGNOSIS — E11.3393 TYPE 2 DIABETES MELLITUS WITH BOTH EYES AFFECTED BY MODERATE NONPROLIFERATIVE RETINOPATHY WITHOUT MACULAR EDEMA, WITH LONG-TERM CURRENT USE OF INSULIN (HCC): ICD-10-CM

## 2021-11-23 DIAGNOSIS — I10 HTN (HYPERTENSION), BENIGN: ICD-10-CM

## 2021-11-23 DIAGNOSIS — I48.0 PAROXYSMAL ATRIAL FIBRILLATION (HCC): ICD-10-CM

## 2021-11-23 DIAGNOSIS — E78.2 MIXED HYPERLIPIDEMIA: ICD-10-CM

## 2021-11-23 DIAGNOSIS — Z79.4 TYPE 2 DIABETES MELLITUS WITH BOTH EYES AFFECTED BY MODERATE NONPROLIFERATIVE RETINOPATHY WITHOUT MACULAR EDEMA, WITH LONG-TERM CURRENT USE OF INSULIN (HCC): ICD-10-CM

## 2021-11-23 LAB
STRESS BASELINE DIAS BP: 76 MMHG
STRESS BASELINE HR: 72 BPM
STRESS BASELINE SYS BP: 122 MMHG
STRESS ESTIMATED WORKLOAD: 9.3 METS
STRESS EXERCISE DUR MIN: NORMAL
STRESS O2 SAT PEAK: 98 %
STRESS O2 SAT REST: 96 %
STRESS PEAK DIAS BP: 76 MMHG
STRESS PEAK SYS BP: 162 MMHG
STRESS PERCENT HR ACHIEVED: 77 %
STRESS POST PEAK HR: 127 BPM
STRESS RATE PRESSURE PRODUCT: NORMAL BPM*MMHG
STRESS ST DEPRESSION: 0 MM
STRESS ST ELEVATION: 0 MM
STRESS TARGET HR: 165 BPM

## 2021-11-23 PROCEDURE — 93306 TTE W/DOPPLER COMPLETE: CPT | Performed by: SPECIALIST

## 2021-11-23 PROCEDURE — 93015 CV STRESS TEST SUPVJ I&R: CPT | Performed by: SPECIALIST

## 2021-11-23 PROCEDURE — A9500 TC99M SESTAMIBI: HCPCS

## 2021-11-23 PROCEDURE — 78452 HT MUSCLE IMAGE SPECT MULT: CPT | Performed by: SPECIALIST

## 2021-11-23 RX ORDER — TETRAKIS(2-METHOXYISOBUTYLISOCYANIDE)COPPER(I) TETRAFLUOROBORATE 1 MG/ML
10 INJECTION, POWDER, LYOPHILIZED, FOR SOLUTION INTRAVENOUS ONCE
Status: COMPLETED | OUTPATIENT
Start: 2021-11-23 | End: 2021-11-23

## 2021-11-23 RX ORDER — TETRAKIS(2-METHOXYISOBUTYLISOCYANIDE)COPPER(I) TETRAFLUOROBORATE 1 MG/ML
30 INJECTION, POWDER, LYOPHILIZED, FOR SOLUTION INTRAVENOUS ONCE
Status: COMPLETED | OUTPATIENT
Start: 2021-11-23 | End: 2021-11-23

## 2021-11-23 RX ADMIN — TETRAKIS(2-METHOXYISOBUTYLISOCYANIDE)COPPER(I) TETRAFLUOROBORATE 8.7 MILLICURIE: 1 INJECTION, POWDER, LYOPHILIZED, FOR SOLUTION INTRAVENOUS at 08:15

## 2021-11-23 RX ADMIN — TETRAKIS(2-METHOXYISOBUTYLISOCYANIDE)COPPER(I) TETRAFLUOROBORATE 25.4 MILLICURIE: 1 INJECTION, POWDER, LYOPHILIZED, FOR SOLUTION INTRAVENOUS at 10:05

## 2021-11-24 LAB
AV R PG: 54.16 MMHG
ECHO AO ASC DIAM: 3.64 CM
ECHO AO ROOT DIAM: 3.74 CM
ECHO AR MAX VEL PISA: 367.97 CM/S
ECHO AV AREA PEAK VELOCITY: 1.36 CM2
ECHO AV AREA VTI: 1.42 CM2
ECHO AV AREA/BSA PEAK VELOCITY: 0.6 CM2/M2
ECHO AV AREA/BSA VTI: 0.6 CM2/M2
ECHO AV MEAN GRADIENT: 15.89 MMHG
ECHO AV PEAK GRADIENT: 25.54 MMHG
ECHO AV PEAK VELOCITY: 252.67 CM/S
ECHO AV REGURGITANT PHT: 531.3 MS
ECHO AV VTI: 48.42 CM
ECHO IVC PROX: 1.54 CM
ECHO LA AREA 4C: 23.23 CM2
ECHO LA MAJOR AXIS: 3.71 CM
ECHO LA MINOR AXIS: 1.57 CM
ECHO LA VOL 4C: 68.62 ML (ref 18–58)
ECHO LA VOLUME INDEX A4C: 29.08 ML/M2 (ref 16–28)
ECHO LV E' LATERAL VELOCITY: 9.82 CM/S
ECHO LV E' SEPTAL VELOCITY: 9.25 CM/S
ECHO LV INTERNAL DIMENSION DIASTOLIC: 4.33 CM (ref 4.2–5.9)
ECHO LV INTERNAL DIMENSION SYSTOLIC: 2.98 CM
ECHO LV IVSD: 1.51 CM (ref 0.6–1)
ECHO LV MASS 2D: 256.7 G (ref 88–224)
ECHO LV MASS INDEX 2D: 108.8 G/M2 (ref 49–115)
ECHO LV POSTERIOR WALL DIASTOLIC: 1.46 CM (ref 0.6–1)
ECHO LVOT DIAM: 2.09 CM
ECHO LVOT PEAK GRADIENT: 4.03 MMHG
ECHO LVOT PEAK VELOCITY: 100.4 CM/S
ECHO LVOT SV: 68.7 ML
ECHO LVOT VTI: 19.99 CM
ECHO MV A VELOCITY: 44.73 CM/S
ECHO MV E DECELERATION TIME (DT): 189.71 MS
ECHO MV E VELOCITY: 82.24 CM/S
ECHO MV E/A RATIO: 1.84
ECHO MV E/E' LATERAL: 8.37
ECHO MV E/E' RATIO (AVERAGED): 8.63
ECHO MV E/E' SEPTAL: 8.89
ECHO PV MAX VELOCITY: 103.96 CM/S
ECHO PV PEAK INSTANTANEOUS GRADIENT SYSTOLIC: 4.32 MMHG
ECHO PV REGURGITANT MAX VELOCITY: 133.74 CM/S
ECHO RA MINOR AXIS: 3.21 CM
ECHO RV INTERNAL DIMENSION: 3.54 CM
ECHO RV TAPSE: 2.25 CM (ref 1.5–2)

## 2021-11-29 NOTE — PROGRESS NOTES
[address] [facility]              OFFICE NOTE          Ani Thomas   1965  [id number]    Date/Time:  11/30/2021  Cardiologist: Ashley Carver M.D.,F.A.C.C.    SUBJECTIVE:    Last OV 10/2020  Reviewed nuclear stress test results which suggest possible ischemia in the anterior wall and apex, discussed proceeding with a cardiac catheterization and he was agreeable  Patient does have some CKD stage III but I do not have any recent labs to know where his renal function is at this time  We will request recent labs from his endocrinologist Dr. Mechelle Killian for review  Also discussed echocardiogram results which showed EF 60-65%, mod CLVH, mild to mod AS, mild to mod AI  Advised patient that according to Dr. 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 which is tentatively scheduled for February  We discussed the fact that this may need to be postponed if he ends up getting his stent during his cardiac catheterization  We will address this at his follow-up visit  He mentions that he has been having really bad gas and for the last few days he has held his fenofibrate with improvement in his symptoms  Due to his symptoms and his renal function will stop fenofibrate for now  He will continue Vascepa and let me know how his symptoms are doing at his follow-up visit    **After his office visit labs received from endocrinology dated September 14, 2021  A1c 8%, BUN 28, creatinine 1.7, potassium 4.9  Lipids Dara 10, 2021 -, , HDL 33, LDL 85  CMP Dara 10, 2021 -BUN 31, creatinine 1.9, potassium 4.3, LFTs okay, TSH 0.269, T4 1.66 and normal    ASSESSMENT & PLAN:    PAF: he remains in NSR after AF ablation on 9/15. dronedarone stopped on 1/1/16. Previous holter with no AF and NSR.  His [id number] VASC score is 3 with 3.2 % cva risk but he prefers to continue plavix at this time, does not want to take [address], this was previously discussed with Dr. Patrick Bravo and patient agreed to contact the office right away if he has any recurrent  palpitations fatigue sob, he told Dr. Patrick Bravo he felt great in NSR and therefore he was very symptomatic when in AFib, closely followed by        CAD: completely asymptomatic and active however his recent nuclear stress test showed possible ischemia in the anterior wall and apex, will proceed with left heart catheterization to evaluate for obstructive CAD, gave patient preprocedure labs to have drawn prior to his catheterization  -He will continue Plavix, Bystolic, rosuvastatin and I gave him a prescription for nitroglycerin sublingual tablets to take as needed for chest pain  -I advised him not to take the nitroglycerin within 24 hours of his Viagra and vice versa  -Currently he is taking his Viagra nightly so I advised him to stop this for now and will readdress it at his follow-up visit  -He will follow up with me 1 week after his catheterization procedure     HTN:  well controlled on Bystolic, fosinopril, hydrochlorothiazide, diltiazem ER     HLD: on rosuvastatin and vascepa, will stop fenofibrate for now due to decreased renal function and symptoms as above, will request recent lipid results from his endocrinologist for review     AS: mild to mod AS, mild to mod AI, discussed results of echocardiogram today and Dr. Carlota Gutierrez recommendation that he have a right heart catheterization as well to evaluate his valve further     Obesity: discussed importance of weight loss and exercise, he is currently limited due to his osteoarthritis in the knee     ZAK: on cpap     AODM: closely followed by endocrinologist/Dr. Jennifer Dockery    CKD stage 3: No recent labs available for review, will request recent labs from endocrinologist/ and check a BMP prior to his catheterization    Left carotid bruit: We will perform carotid duplex study at his follow-up visit post catheterization to assess for PAD    ED: He is currently taking sildenafil 20 mg nightly and I advised him to hold off on this for now in case he needs to take nitroglycerin sublingual tablets as needed chest pain, will readdress this at his follow-up visit    Surgical clearance: He is planning to have a left knee replacement in February 2022, will readdress this at his follow-up visit               HPI   48 y.o. male. history of HTN , HLP, CAD/MI in 2008. S/p PCI LAD in 2008 and additional stent in LAD in 2011. He also has residual 70% RV branch stenosis. Admitted to [facility] with AF and RVR on 7/15 nuclear stress test at that time with no ischemia, apical attenuation ( sub endo mi) and EF 54%. Echo also on 7/15: EF 55-60% no rwma,  bicuspid AV and mild AS    Admitted to [facility] on 7/15 for recurrent AF. DOLORES showed no NEWTON, normal LV and tri leaflet AV with possible partial fusion of 2 leaflets, mild AS, AI and MR . He failed cardioversion with persistent AF   successful dc cardioversion on 7/15 while on multaq    Recurrent AF afterwards on 7/15  and eventually AF ablation with Dr. Renny Tay on 9/15    Stopped toprol on his own accord on 11/15 on account of excessive sob    He has GB disease and stones 2015  Echo on 8/16:Left ventricle: Size was normal. Systolic function was normal by visual  assessment. Ejection fraction was estimated to be 60 %. There were no  regional wall motion abnormalities. Wall thickness was mildly increased. Aortic valve: There was an apparent echogenic mass of tissue appearing in  the outflow tract adherent to the right coronary cusp; unknown if  clinically significant.  Leaflets exhibited moderately increased thickness,  moderate calcification, and mildly reduced cuspal separation. Transaortic  velocity was increased due to valvular stenosis. There was mild stenosis. VIRA was 2.0 cm2, peak/mean gradients were 29/17 mmHg, and the DI was 0.45. There was mild regurgitation. Aorta, systemic arteries: The root exhibited normal size; however, the  ascending aorta was mildly enlarged; sinus of valsalva was 3.8 cm, the  ST-J was 3.5 cm, and the ascending aorta was 4.0 cm. ER visit for cp on 5/17 normal w/u but elevated BP    NUCLEAR STRESS TEST on 6/2/17 no gross ischemia fixed apical and infero apical defect EF 60%     ECHO on 5/17: EF 60% mild AS mean gradient of 14 mmhg mild OK    CARDIAC STUDIES      NUCLEAR CARDIAC STRESS TEST 11/23/2021     Interpretation Summary  · SPECT: Left ventricular function post-stress was normal. Calculated ejection fraction is 63% (normal EF value is equal to or greater than 50%). Left ventricular wall motion was normal at stress, no regional wall motion abnormality noted. There is no evidence of transient ischemic dilation (TID). The TID ratio is 1.06.  · Baseline ECG: Normal sinus rhythm, rsr.  · SPECT: Myocardial perfusion imaging defect 1: There is a defect that is small to medium in size present in the apical segment(s) of the anterior wall(s) that is partially reversible. There is normal wall motion in the defect area. Viability in the area is good. The defect appears to probably be ischemia. · SPECT: Left ventricular perfusion is abnormal. Myocardial perfusion imaging supports an intermediate risk stress test.  · Mixed infarction and ischemia in a small to medium area of distal anterior wall and apex  · EF preserved at 63%    Signed by: Jamir Negron MD on 11/23/2021  4:58 PM    11/23/21    ECHO ADULT COMPLETE 11/24/2021 11/24/2021    Interpretation Summary  · LV: Estimated LVEF is 60 - 65%. Visually measured ejection fraction.  Normal cavity size and systolic function (ejection fraction normal). Moderate concentric hypertrophy. Mild (grade 1) left ventricular diastolic dysfunction. · LA: Mildly dilated left atrium. · AV: Probably trileaflet aortic valve. Moderate aortic valve leaflet calcification present with reduced excursion. Aortic valve mean gradient is 16 mmHg. Aortic valve area is 1.4 cm2. Mild to moderate aortic valve stenosis is present. Mild to moderate aortic valve regurgitation is present. · Image quality for this study was technically difficult. · Contrast used: DEFINITY. Signed by: Zoey Hylton MD on 11/24/2021  1:56 PM      EKG Results     None        IMAGING      MRI Results (most recent):  Results from [place] encounter on 08/25/15    MRA CHEST W CONT    Narrative  **Final Report**      ICD Codes / Adm. Diagnosis: 427.31  401.1 / Atrial fibrillation [facility])  Examination:  MR CHEST MRA W CON  - [id number] - Aug 25 2015 12:05PM  Accession No:  [id number]  Reason:  preop      REPORT:    CARDIOVASCULAR MRI    INDICATION: preop    PROCEDURAL DATA:    CPT Codes: 09851    SCANS PERFORMED:  Spin Echo: Axial.  Pulmonary Vein Angiogram    Contrast Agent: Magnevist.  Contrast Dose: 40ml. CLINICAL DATA:  HR = 74/min, BP = 135/91mmHg, HT = 5 feet 10, WT = 274lb. Impression  :    1. All pulmonary veins visualized draining the left atrium. There is no  accessory pulmonary veins. There is no sinus venosus defect. The left upper  and lower pulmonary vein drain close to each other without significant  separation. The esophagus courses behind the left atrium close to the os of  the left upper and lower pulmonary veins. The individual pulmonary vein  ostial dimensions are as follows:    Right upper pulmonary vein: 23 x 21 mm. Right lower pulmonary vein: 28 x 21 mm. Left upper pulmonary vein: 19 x 18 mm. Left lower pulmonary vein: 25 x 19 mm.  2. These is no left atrial appendage thrombus. 3. Normal origin of the great vessels of the aortic arch.  Normal ascending  aorta and descending thoracic aorta without aneurysm or dissection. Signing/Reading Doctor: Ivet Fowlerchure ([id number])  Roselyn Rojo ([id number])  Aug 25 2015  4:22PM      CT Results (most recent):  Results from [place] encounter on 10/01/15    CT NECK SOFT TISSUE W CONT    Narrative  **Final Report**      ICD Codes / Adm. Diagnosis: V45.82  Z98.61 / Abscess  Neck abcess  Examination:  CT SOFT TISSUE NECK W CON  - [id number] - Oct  1 2015  9:43PM  Accession No:  [id number]  Reason:  Pain      REPORT:  EXAM:  CT SOFT TISSUE NECK W CON    INDICATION: Postsurgical pain and neck abscess. COMPARISON: None. CONTRAST: 97 mL of Isovue-300. The patient was instructed to discontinue  metformin for 48 hours and check with his physician before resuming the  medication. TECHNIQUE: Multislice helical CT was performed from the mid calvarium to the  pulmonary massimo during uneventful rapid bolus intravenous contrast  administration. Contiguous 2.5 mm axial images were reconstructed and lung  and soft tissue windows were generated. Coronal reformations were generated. CT dose reduction was achieved through use of a standardized protocol  tailored for this examination and automatic exposure control for dose  modulation. FINDINGS:    No mass or adenopathy is identified within the neck. The carotid and jugular vessels enhance normally. The patient has a large  neck and there are engorged veins in the neck bilaterally. The thyroid gland, submandibular salivary glands and parotid glands are  normal.    No nasopharyngeal, pharyngeal or laryngeal mass is identified. No abnormalities are identified in the visualized portions of the brain or  orbits. The visualized mastoid air cells and paranasal sinuses are clear. The visualized portions of the lung apices are clear. There are mild degenerative changes of the upper thoracic spine.     Impression  : No mass, adenopathy or abscess. Signing/Reading Doctor: Dennie Breath ([id number])  Approved: Dennie Breath ([id number])  Oct  1 2015  9:56PM      XR Results (most recent):  Results from Hospital Encounter encounter on 01/21/21    XR KNEE LT MAX 2 VWS    Narrative  INDICATION:  left patellar edema, left leg edema    Exam: AP, lateral views of the left knee. FINDINGS: There is no acute fracture-dislocation. There are small  tricompartmental osteophytes. . Bones are well-mineralized. No suprapatellar  joint fluid is visualized. Impression  No acute fracture or dislocation.           Past Medical History:   Diagnosis Date    Arthritis     HTN (hypertension), benign 3/31/2010    Mixed hyperlipidemia 3/31/2010    ZAK (obstructive sleep apnea) 3/31/2010     Past Surgical History:   Procedure Laterality Date    HX AFIB ABLATION      HX ANGIOPLASTY      HX ORTHOPAEDIC  2007    L Knee Arthroscopy    RI CARDIAC SURG PROCEDURE UNLIST      4 stents placed 2009,2011     Social History     Tobacco Use    Smoking status: Never Smoker    Smokeless tobacco: Never Used   Substance Use Topics    Alcohol use: No     Alcohol/week: 0.0 standard drinks    Drug use: No     Family History   Problem Relation Age of Onset    Arthritis-osteo Mother     Diabetes Mother     Elevated Lipids Mother     Heart Disease Mother     Hypertension Mother     Elevated Lipids Father     Heart Disease Father     Hypertension Father     Cancer Father         skin and angiosarcoma    Diabetes Maternal Grandmother     Hypertension Brother     Diabetes Brother     Hypertension Brother     Diabetes Brother     Hypertension Brother      Allergies   Allergen Reactions    Cleocin [Clindamycin Hcl] Rash         Visit Vitals  /80 (BP 1 Location: Right arm, BP Patient Position: Sitting, BP Cuff Size: Adult)   Pulse 70   Resp 14   Ht 5' 10\" (1.778 m)   Wt 270 lb (122.5 kg)   SpO2 98%   BMI 38.74 kg/m²         Last 3 Recorded Weights in this Encounter    11/30/21 0903   Weight: 270 lb (122.5 kg)            Review of Systems:   Pertinent items are noted in the History of Present Illness. Neck: no JVD and left carotid bruit present   Heart: regular rate and rhythm, systolic murmur: systolic ejection 3/6, crescendo at 2nd right intercostal space  Lungs: clear to auscultation bilaterally  Abdomen: soft, non-tender. Bowel sounds normal. No masses,  no organomegaly  Extremities: no edema      Current Outpatient Medications on File Prior to Visit   Medication Sig Dispense Refill    clopidogreL (PLAVIX) 75 mg tab Take 1 tablet by mouth once daily 30 Tablet 0    nebivoloL (BYSTOLIC) 10 mg tablet TAKE 1 TABLET BY MOUTH ONCE DAILY PATIENT NEEDS  APPOINTMENT 30 Tablet 0    fosinopriL (MONOPRIL) 40 mg tablet TAKE 1 TABLET BY MOUTH ONCE DAILY - PATIENT NEEDS APPOINTMENT 30 Tablet 0    hydroCHLOROthiazide (HYDRODIURIL) 25 mg tablet TAKE 1 TABLET BY MOUTH ONCE DAILY PT  NEEDS  APPOINTMENT 30 Tablet 0    dilTIAZem ER (CARDIZEM CD) 240 mg capsule TAKE 1 CAPSULE BY MOUTH ONCE DAILY NEEDS  APPOINTMENT 30 Capsule 0    rosuvastatin (CRESTOR) 40 mg tablet TAKE 1 TABLET BY MOUTH ONCE DAILY -  PATIENT  NEEDS  APPOINTMENT 30 Tablet 0    Vascepa 1 gram capsule TAKE 2 CAPSULES BY MOUTH TWICE DAILY WITH MEALS . APPOINTMENT REQUIRED FOR FUTURE REFILLS 120 Capsule 0    Vitamin D3 50 mcg (2,000 unit) cap capsule Take 1 capsule by mouth once daily 90 Cap 3    DULoxetine (Cymbalta) 30 mg capsule Take 1 Cap by mouth daily.  miSOPROStoL (CYTOTEC) 200 mcg tablet Take 1 Tab by mouth daily.  FreeStyle Almita 14 Day Sensor kit CHECK BLOOD SUGAR AS DIRECTED. CHANGE SENSOR EVERY 14 DAYS      V-GO 20 vinicio       OTHER humulog insulin 20 units/24 hours continuously via insulin pump.  OTHER humulog insulin sliding tid with meals.  semaglutide (OZEMPIC SC) by SubCUTAneous route every seven (7) days.  JARDIANCE 25 mg tablet Take 25 mg by mouth daily.       ONETOUCH ULTRA2 monitoring kit       pantoprazole (PROTONIX) 40 mg tablet Take 1 Tab by mouth daily. 1 Tab 1    ONETOUCH ULTRA TEST strip       metFORMIN (GLUCOPHAGE) 1,000 mg tablet TAKE ONE TABLET BY MOUTH TWICE DAILY WITH  MEALS 180 Tab 0    TESTOSTERONE IM by IntraMUSCular route. Every 10 weeks. Aveed      multivitamin (ONE A DAY) tablet Take 1 Tab by mouth daily.  [DISCONTINUED] fenofibrate nanocrystallized (TRICOR) 145 mg tablet TAKE 1 TABLET BY MOUTH ONCE DAILY PATIENT NEEDS  APPOINTMENT 30 Tablet 0    [DISCONTINUED] sildenafil, antihypertensive, (REVATIO) 20 mg tablet TAKE UP TO 5 TABS BY MOUTH AS DIRECTED  11     No current facility-administered medications on file prior to visit. Devon Newton. [facility] had no medications administered during this visit. Current Outpatient Medications   Medication Sig    nitroglycerin (NITROSTAT) 0.4 mg SL tablet 1 Tablet by SubLINGual route every five (5) minutes as needed for Chest Pain for up to 3 doses.  sildenafiL, pulmonary hypertension, (REVATIO) 20 mg tablet Take 1 Tablet by mouth every evening.  clopidogreL (PLAVIX) 75 mg tab Take 1 tablet by mouth once daily    nebivoloL (BYSTOLIC) 10 mg tablet TAKE 1 TABLET BY MOUTH ONCE DAILY PATIENT NEEDS  APPOINTMENT    fosinopriL (MONOPRIL) 40 mg tablet TAKE 1 TABLET BY MOUTH ONCE DAILY - PATIENT NEEDS APPOINTMENT    hydroCHLOROthiazide (HYDRODIURIL) 25 mg tablet TAKE 1 TABLET BY MOUTH ONCE DAILY PT  NEEDS  APPOINTMENT    dilTIAZem ER (CARDIZEM CD) 240 mg capsule TAKE 1 CAPSULE BY MOUTH ONCE DAILY NEEDS  APPOINTMENT    rosuvastatin (CRESTOR) 40 mg tablet TAKE 1 TABLET BY MOUTH ONCE DAILY -  PATIENT  NEEDS  APPOINTMENT    Vascepa 1 gram capsule TAKE 2 CAPSULES BY MOUTH TWICE DAILY WITH MEALS . APPOINTMENT REQUIRED FOR FUTURE REFILLS    Vitamin D3 50 mcg (2,000 unit) cap capsule Take 1 capsule by mouth once daily    DULoxetine (Cymbalta) 30 mg capsule Take 1 Cap by mouth daily.     miSOPROStoL (CYTOTEC) 200 mcg tablet Take 1 Tab by mouth daily.  FreeStyle Almita 14 Day Sensor kit CHECK BLOOD SUGAR AS DIRECTED. CHANGE SENSOR EVERY 14 DAYS    V-GO 20 vinicio     OTHER humulog insulin 20 units/24 hours continuously via insulin pump.  OTHER humulog insulin sliding tid with meals.  semaglutide (OZEMPIC SC) by SubCUTAneous route every seven (7) days.  JARDIANCE 25 mg tablet Take 25 mg by mouth daily.  ONETOUCH ULTRA2 monitoring kit     pantoprazole (PROTONIX) 40 mg tablet Take 1 Tab by mouth daily.  ONETOUCH ULTRA TEST strip     metFORMIN (GLUCOPHAGE) 1,000 mg tablet TAKE ONE TABLET BY MOUTH TWICE DAILY WITH  MEALS    TESTOSTERONE IM by IntraMUSCular route. Every 10 weeks. Aveed    multivitamin (ONE A DAY) tablet Take 1 Tab by mouth daily. No current facility-administered medications for this visit. Lab Results   Component Value Date/Time    Cholesterol, total 139 09/11/2019 08:50 AM    HDL Cholesterol 36 (L) 09/11/2019 08:50 AM    LDL,Direct 80 07/10/2015 04:44 AM    LDL, calculated 50 09/11/2019 08:50 AM    VLDL, calculated 53 (H) 09/11/2019 08:50 AM    Triglyceride 266 (H) 09/11/2019 08:50 AM    CHOL/HDL Ratio 5.6 (H) 07/10/2015 04:44 AM       Lab Results   Component Value Date/Time    Sodium 142 11/18/2019 08:10 AM    Potassium 4.6 11/18/2019 08:10 AM    Chloride 106 11/18/2019 08:10 AM    CO2 30 11/18/2019 08:10 AM    Anion gap 6 11/18/2019 08:10 AM    Glucose 167 (H) 11/18/2019 08:10 AM    BUN 34 (H) 11/18/2019 08:10 AM    Creatinine 1.71 (H) 11/18/2019 08:10 AM    BUN/Creatinine ratio 20 11/18/2019 08:10 AM    GFR est AA 51 (L) 11/18/2019 08:10 AM    GFR est non-AA 42 (L) 11/18/2019 08:10 AM    Calcium 9.8 11/18/2019 08:10 AM    Calcium 9.7 11/18/2019 08:10 AM       Lab Results   Component Value Date/Time    ALT (SGPT) 30 09/11/2019 08:50 AM    Alk.  phosphatase 34 (L) 09/11/2019 08:50 AM    Bilirubin, direct 0.14 09/11/2019 08:50 AM    Bilirubin, total 0.3 09/11/2019 08:50 AM       Lab Results   Component Value Date/Time    WBC 6.2 11/18/2019 08:10 AM    Hemoglobin (POC) 14.6 01/24/2010 02:15 AM    HGB 15.1 11/18/2019 08:10 AM    Hematocrit (POC) 43 01/24/2010 02:15 AM    HCT 50.0 11/18/2019 08:10 AM    PLATELET 222 21/12/4221 08:10 AM    MCV 94.2 11/18/2019 08:10 AM       Lab Results   Component Value Date/Time    TSH 0.94 07/09/2015 03:15 AM         Lab Results   Component Value Date/Time    Cholesterol, total 139 09/11/2019 08:50 AM    Cholesterol, total 195 01/29/2019 08:56 AM    Cholesterol, total 151 09/15/2017 08:56 AM    Cholesterol, total 208 (H) 04/24/2017 08:34 AM    Cholesterol, total 143 02/03/2016 09:40 AM    HDL Cholesterol 36 (L) 09/11/2019 08:50 AM    HDL Cholesterol 36 (L) 01/29/2019 08:56 AM    HDL Cholesterol 39 (L) 09/15/2017 08:56 AM    HDL Cholesterol 49 04/24/2017 08:34 AM    HDL Cholesterol 50 02/03/2016 09:40 AM    LDL,Direct 80 07/10/2015 04:44 AM    LDL, calculated 50 09/11/2019 08:50 AM    LDL, calculated Comment 01/29/2019 08:56 AM    LDL, calculated 58 09/15/2017 08:56 AM    LDL, calculated 95 04/24/2017 08:34 AM    LDL, calculated 66 02/03/2016 09:40 AM    Triglyceride 266 (H) 09/11/2019 08:50 AM    Triglyceride 442 (H) 01/29/2019 08:56 AM    Triglyceride 271 (H) 09/15/2017 08:56 AM    Triglyceride 321 (H) 04/24/2017 08:34 AM    Triglyceride 137 02/03/2016 09:40 AM    CHOL/HDL Ratio 5.6 (H) 07/10/2015 04:44 AM        GABRIELLE Fernandes, [address] [facility]  [facility] [address], [address] [facility] [address]  [address]  ) [phone] [place] [place] [phone]

## 2021-11-30 ENCOUNTER — OFFICE VISIT (OUTPATIENT)
Dept: CARDIOLOGY CLINIC | Age: 56
End: 2021-11-30
Payer: COMMERCIAL

## 2021-11-30 ENCOUNTER — TELEPHONE (OUTPATIENT)
Dept: CARDIOLOGY CLINIC | Age: 56
End: 2021-11-30

## 2021-11-30 VITALS
HEIGHT: 70 IN | SYSTOLIC BLOOD PRESSURE: 124 MMHG | RESPIRATION RATE: 14 BRPM | WEIGHT: 270 LBS | BODY MASS INDEX: 38.65 KG/M2 | HEART RATE: 70 BPM | OXYGEN SATURATION: 98 % | DIASTOLIC BLOOD PRESSURE: 80 MMHG

## 2021-11-30 DIAGNOSIS — I35.0 NONRHEUMATIC AORTIC VALVE STENOSIS: ICD-10-CM

## 2021-11-30 DIAGNOSIS — N18.31 STAGE 3A CHRONIC KIDNEY DISEASE (HCC): ICD-10-CM

## 2021-11-30 DIAGNOSIS — I25.83 CORONARY ARTERY DISEASE DUE TO LIPID RICH PLAQUE: ICD-10-CM

## 2021-11-30 DIAGNOSIS — I25.10 CORONARY ARTERY DISEASE DUE TO LIPID RICH PLAQUE: ICD-10-CM

## 2021-11-30 DIAGNOSIS — Z86.79 S/P ABLATION OF ATRIAL FIBRILLATION: ICD-10-CM

## 2021-11-30 DIAGNOSIS — R94.39 ABNORMAL NUCLEAR STRESS TEST: Primary | ICD-10-CM

## 2021-11-30 DIAGNOSIS — I35.1 NONRHEUMATIC AORTIC VALVE INSUFFICIENCY: ICD-10-CM

## 2021-11-30 DIAGNOSIS — R09.89 BRUIT OF LEFT CAROTID ARTERY: ICD-10-CM

## 2021-11-30 DIAGNOSIS — E66.01 SEVERE OBESITY (BMI 35.0-35.9 WITH COMORBIDITY) (HCC): ICD-10-CM

## 2021-11-30 DIAGNOSIS — I48.0 PAROXYSMAL ATRIAL FIBRILLATION (HCC): ICD-10-CM

## 2021-11-30 DIAGNOSIS — Z98.890 S/P ABLATION OF ATRIAL FIBRILLATION: ICD-10-CM

## 2021-11-30 PROBLEM — I25.119 ATHEROSCLEROTIC HEART DISEASE OF NATIVE CORONARY ARTERY WITH UNSPECIFIED ANGINA PECTORIS (HCC): Status: ACTIVE | Noted: 2021-11-30

## 2021-11-30 PROBLEM — I20.9 ANGINA PECTORIS, UNSPECIFIED (HCC): Status: ACTIVE | Noted: 2021-11-30

## 2021-11-30 PROCEDURE — 99214 OFFICE O/P EST MOD 30 MIN: CPT | Performed by: NURSE PRACTITIONER

## 2021-11-30 RX ORDER — SILDENAFIL CITRATE 20 MG/1
20 TABLET ORAL EVERY EVENING
Qty: 30 TABLET | Refills: 0
Start: 2021-11-30 | End: 2021-12-13

## 2021-11-30 RX ORDER — NITROGLYCERIN 0.4 MG/1
0.4 TABLET SUBLINGUAL
Qty: 25 EACH | Refills: 1 | Status: SHIPPED | OUTPATIENT
Start: 2021-11-30

## 2021-11-30 NOTE — TELEPHONE ENCOUNTER
Patient needs left and right heart catheterization for CAD, abnormal stress test and aortic stenosis   Please schedule with interventional cardiology at [facility]  I gave him pre-procedure lab orders today    Please schedule him to see me one week post cath with a same day carotid duplex to assess for carotid stenosis    Call his mobile number to reach him during the day time     Please make him a follow up visit with Dr. Alex Pimentel in 3 months as well

## 2021-11-30 NOTE — PATIENT INSTRUCTIONS
Please stop fenofibrate    Please have labs drawn a few days prior to your procedure  Dr. Mandi Mills team will call you to schedule your procedure    You have been given a prescription for Nitroglycerin to take ONLY AS NEEDED for chest pain. You can take one tablet under your tongue for chest pain every 5 minutes up to a total of 3 tablets. If your chest pain is not gone after the 3rd tab, call 9-11 and go to the nearest emergency room. If you are using sublingual Nitroglycerin tablets as needed for chest pain then you should not use Viagra within 24 hours of taking Nitroglycerin and vice versa. For now do not take your nightly Viagra until your procedure.

## 2021-12-01 ENCOUNTER — DOCUMENTATION ONLY (OUTPATIENT)
Dept: CARDIOLOGY CLINIC | Age: 56
End: 2021-12-01

## 2021-12-01 NOTE — PROGRESS NOTES
This swetha is getting a cath and has CKD stage 3, last creatinine 1.7  Not sure what Dr. Erlinda Muñoz likes to do for CKD patients prior to dye load with cardiac cath, please discuss it with him

## 2021-12-02 ENCOUNTER — TELEPHONE (OUTPATIENT)
Dept: CARDIOLOGY CLINIC | Age: 56
End: 2021-12-02

## 2021-12-02 DIAGNOSIS — Z01.810 PREPROCEDURAL CARDIOVASCULAR EXAMINATION: Primary | ICD-10-CM

## 2021-12-02 NOTE — TELEPHONE ENCOUNTER
Adrian Reese NP  You 21 hours ago (2:46 PM)     KD    Please let him know this     [place]     Message text      MD Adrian Campbell NP Yesterday (10:02 AM)     MG    Hold hctz 48 hours prior    Message text      Adrian Reese NP routed conversation to You; Radha Flores MD Yesterday (8:24 AM)     Adrian Reese NP Yesterday (8:23 AM)     KD       This swetha is getting a cath and has CKD stage 3, last creatinine 1.7  Not sure what Dr. Garima Lopez likes to do for CKD patients prior to dye load with cardiac cath, please discuss it with him      Unsigned   Documentation      Adrian Reese NP     KD    9:56 AM  Note  Patient needs left and right heart catheterization for CAD, abnormal stress test and aortic stenosis   Please schedule with interventional cardiology at [facility]  I gave him pre-procedure lab orders today     Please schedule him to see me one week post cath with a same day carotid duplex to assess for carotid stenosis     Call his mobile number to reach him during the day time      Please make him a follow up visit with Dr. Garima Lopez in 3 months as well            [facility] address:  [address] [facility], [address] [place] [address]    Procedure: Left and Right Heart Catheterization    Your procedure will be scheduled at [address] will proceed to the main entrance of the hospital where you will be screened and checked in. Bring your insurance information and list of current medications. You may not have anything to eat or drink after midnight the night prior to your procedure, except for sips of water to take medications. Medication instructions: Hold metformin the day before, day of and day after your procedure; hold your Hydrochlorothiazide 2 days prior to your procedure    *Have lab work completed no more then 30 days prior to the procedure but at least 2-3 days prior to the procedure.   **You will need to be COVID tested FOUR DAYS PRIOR to your procedure at the [facility] drive-u testing located at [address] in the old patient discharge open M-F 7a-3p. *You will need someone to drive you home after the procedure. Plan to be at [facility] a total of 6-8 hours. *Arrange for a responsible adult to help you at home for at least 24 hours,  *Wear comfortable clothing. Leave jewelry, money, and other valuables at home. You may wear dentures, eyeglasses, and/or hearing aids. *Bring an overnight bag with you (just incase you need to spend the night.)    Post Procedure Instructions:  *No driving for 24 hours post procedure. *No heavy lifting (over 10 lbs) or strenuous activity for 48 hours. *No tub baths, swimming, hot tubs, or spas for 1 week. The band aid over cath site may be removed the day after procedure and site washed gently with soap and water. *The site may appear bruised/ discolored for a couple of weeks. A small knot may be present. You may experience tenderness or soreness in groin area. This may be relieved with the use of Tylenol. *If there is any visible blood at the site, hold pressure for 20 minutes. *Call the office if you should notice numbness, tingling, coldness, or loss of feeling in the area. Call the office if you have a fever within 2-3 days after procedure. Remember, when you begin lifting things, use proper body mechanics and bend with your knees, centering the weight on your legs. Please call with any questions: [phone].  You may also contact the cath lab directly at [phone]

## 2021-12-07 ENCOUNTER — TRANSCRIBE ORDER (OUTPATIENT)
Dept: REGISTRATION | Age: 56
End: 2021-12-07

## 2021-12-07 ENCOUNTER — HOSPITAL ENCOUNTER (OUTPATIENT)
Dept: PREADMISSION TESTING | Age: 56
Discharge: HOME | End: 2021-12-07
Attending: STUDENT IN AN ORGANIZED HEALTH CARE EDUCATION/TRAINING PROGRAM
Payer: COMMERCIAL

## 2021-12-07 DIAGNOSIS — Z01.812 PRE-PROCEDURE LAB EXAM: Primary | ICD-10-CM

## 2021-12-07 DIAGNOSIS — Z01.812 PRE-PROCEDURE LAB EXAM: ICD-10-CM

## 2021-12-07 PROCEDURE — U0005 INFEC AGEN DETEC AMPLI PROBE: HCPCS

## 2021-12-07 RX ORDER — SODIUM CHLORIDE 0.9 % (FLUSH) 0.9 %
5-40 SYRINGE (ML) INJECTION EVERY 8 HOURS
Status: CANCELLED | OUTPATIENT
Start: 2021-12-07

## 2021-12-07 RX ORDER — SODIUM CHLORIDE 0.9 % (FLUSH) 0.9 %
5-40 SYRINGE (ML) INJECTION AS NEEDED
Status: CANCELLED | OUTPATIENT
Start: 2021-12-07

## 2021-12-07 RX ORDER — SODIUM CHLORIDE 9 MG/ML
100 INJECTION, SOLUTION INTRAVENOUS CONTINUOUS
Status: CANCELLED | OUTPATIENT
Start: 2021-12-07 | End: 2021-12-07

## 2021-12-08 LAB
BUN SERPL-MCNC: 28 MG/DL (ref 6–24)
BUN/CREAT SERPL: 16 (ref 9–20)
CALCIUM SERPL-MCNC: 9.9 MG/DL (ref 8.7–10.2)
CHLORIDE SERPL-SCNC: 98 MMOL/L (ref 96–106)
CO2 SERPL-SCNC: 26 MMOL/L (ref 20–29)
CREAT SERPL-MCNC: 1.71 MG/DL (ref 0.76–1.27)
ERYTHROCYTE [DISTWIDTH] IN BLOOD BY AUTOMATED COUNT: 12.6 % (ref 11.6–15.4)
GLUCOSE SERPL-MCNC: 136 MG/DL (ref 65–99)
HCT VFR BLD AUTO: 48.8 % (ref 37.5–51)
HGB BLD-MCNC: 16.5 G/DL (ref 13–17.7)
INTERPRETATION: NORMAL
MCH RBC QN AUTO: 29 PG (ref 26.6–33)
MCHC RBC AUTO-ENTMCNC: 33.8 G/DL (ref 31.5–35.7)
MCV RBC AUTO: 86 FL (ref 79–97)
PLATELET # BLD AUTO: 245 X10E3/UL (ref 150–450)
POTASSIUM SERPL-SCNC: 4.4 MMOL/L (ref 3.5–5.2)
RBC # BLD AUTO: 5.68 X10E6/UL (ref 4.14–5.8)
SARS-COV-2, XPLCVT: NOT DETECTED
SODIUM SERPL-SCNC: 138 MMOL/L (ref 134–144)
SOURCE, COVRS: NORMAL
WBC # BLD AUTO: 7.3 X10E3/UL (ref 3.4–10.8)

## 2021-12-10 ENCOUNTER — HOSPITAL ENCOUNTER (OUTPATIENT)
Age: 56
Setting detail: OUTPATIENT SURGERY
Discharge: HOME | End: 2021-12-10
Attending: STUDENT IN AN ORGANIZED HEALTH CARE EDUCATION/TRAINING PROGRAM | Admitting: STUDENT IN AN ORGANIZED HEALTH CARE EDUCATION/TRAINING PROGRAM
Payer: COMMERCIAL

## 2021-12-10 VITALS
DIASTOLIC BLOOD PRESSURE: 77 MMHG | OXYGEN SATURATION: 94 % | HEART RATE: 70 BPM | SYSTOLIC BLOOD PRESSURE: 125 MMHG | RESPIRATION RATE: 16 BRPM | TEMPERATURE: 98.4 F

## 2021-12-10 DIAGNOSIS — R94.39 ABNORMAL STRESS TEST: ICD-10-CM

## 2021-12-10 LAB
GLUCOSE BLD STRIP.AUTO-MCNC: 144 MG/DL (ref 65–117)
SERVICE CMNT-IMP: ABNORMAL

## 2021-12-10 PROCEDURE — 74011000636 HC RX REV CODE- 636: Performed by: STUDENT IN AN ORGANIZED HEALTH CARE EDUCATION/TRAINING PROGRAM

## 2021-12-10 PROCEDURE — 77030029997 HC DEV COM RDL R BND TELE -B: Performed by: STUDENT IN AN ORGANIZED HEALTH CARE EDUCATION/TRAINING PROGRAM

## 2021-12-10 PROCEDURE — 77030013744: Performed by: STUDENT IN AN ORGANIZED HEALTH CARE EDUCATION/TRAINING PROGRAM

## 2021-12-10 PROCEDURE — 99152 MOD SED SAME PHYS/QHP 5/>YRS: CPT | Performed by: STUDENT IN AN ORGANIZED HEALTH CARE EDUCATION/TRAINING PROGRAM

## 2021-12-10 PROCEDURE — 77030042317 HC BND COMPR HEMSTAT -B: Performed by: STUDENT IN AN ORGANIZED HEALTH CARE EDUCATION/TRAINING PROGRAM

## 2021-12-10 PROCEDURE — 74011250636 HC RX REV CODE- 250/636: Performed by: STUDENT IN AN ORGANIZED HEALTH CARE EDUCATION/TRAINING PROGRAM

## 2021-12-10 PROCEDURE — 93460 R&L HRT ART/VENTRICLE ANGIO: CPT | Performed by: STUDENT IN AN ORGANIZED HEALTH CARE EDUCATION/TRAINING PROGRAM

## 2021-12-10 PROCEDURE — 99153 MOD SED SAME PHYS/QHP EA: CPT | Performed by: STUDENT IN AN ORGANIZED HEALTH CARE EDUCATION/TRAINING PROGRAM

## 2021-12-10 PROCEDURE — 77030040934 HC CATH DIAG DXTERITY MEDT -A: Performed by: STUDENT IN AN ORGANIZED HEALTH CARE EDUCATION/TRAINING PROGRAM

## 2021-12-10 PROCEDURE — 74011000250 HC RX REV CODE- 250: Performed by: STUDENT IN AN ORGANIZED HEALTH CARE EDUCATION/TRAINING PROGRAM

## 2021-12-10 PROCEDURE — C1894 INTRO/SHEATH, NON-LASER: HCPCS | Performed by: STUDENT IN AN ORGANIZED HEALTH CARE EDUCATION/TRAINING PROGRAM

## 2021-12-10 PROCEDURE — 82962 GLUCOSE BLOOD TEST: CPT

## 2021-12-10 PROCEDURE — 93458 L HRT ARTERY/VENTRICLE ANGIO: CPT | Performed by: STUDENT IN AN ORGANIZED HEALTH CARE EDUCATION/TRAINING PROGRAM

## 2021-12-10 RX ORDER — SODIUM CHLORIDE 0.9 % (FLUSH) 0.9 %
5-40 SYRINGE (ML) INJECTION AS NEEDED
Status: DISCONTINUED | OUTPATIENT
Start: 2021-12-10 | End: 2021-12-10 | Stop reason: HOSPADM

## 2021-12-10 RX ORDER — SODIUM CHLORIDE 0.9 % (FLUSH) 0.9 %
5-40 SYRINGE (ML) INJECTION EVERY 8 HOURS
Status: DISCONTINUED | OUTPATIENT
Start: 2021-12-10 | End: 2021-12-10 | Stop reason: HOSPADM

## 2021-12-10 RX ORDER — SODIUM CHLORIDE 9 MG/ML
200 INJECTION, SOLUTION INTRAVENOUS CONTINUOUS
Status: DISCONTINUED | OUTPATIENT
Start: 2021-12-10 | End: 2021-12-10 | Stop reason: HOSPADM

## 2021-12-10 RX ORDER — HEPARIN SODIUM 1000 [USP'U]/ML
INJECTION, SOLUTION INTRAVENOUS; SUBCUTANEOUS AS NEEDED
Status: DISCONTINUED | OUTPATIENT
Start: 2021-12-10 | End: 2021-12-10 | Stop reason: HOSPADM

## 2021-12-10 RX ORDER — SODIUM CHLORIDE 9 MG/ML
500 INJECTION, SOLUTION INTRAVENOUS CONTINUOUS
Status: DISCONTINUED | OUTPATIENT
Start: 2021-12-10 | End: 2021-12-10 | Stop reason: HOSPADM

## 2021-12-10 RX ORDER — HEPARIN SODIUM 200 [USP'U]/100ML
INJECTION, SOLUTION INTRAVENOUS
Status: COMPLETED | OUTPATIENT
Start: 2021-12-10 | End: 2021-12-10

## 2021-12-10 RX ORDER — MIDAZOLAM HYDROCHLORIDE 1 MG/ML
INJECTION, SOLUTION INTRAMUSCULAR; INTRAVENOUS AS NEEDED
Status: DISCONTINUED | OUTPATIENT
Start: 2021-12-10 | End: 2021-12-10 | Stop reason: HOSPADM

## 2021-12-10 RX ORDER — FENTANYL CITRATE 50 UG/ML
INJECTION, SOLUTION INTRAMUSCULAR; INTRAVENOUS AS NEEDED
Status: DISCONTINUED | OUTPATIENT
Start: 2021-12-10 | End: 2021-12-10 | Stop reason: HOSPADM

## 2021-12-10 RX ORDER — VERAPAMIL HYDROCHLORIDE 2.5 MG/ML
INJECTION, SOLUTION INTRAVENOUS AS NEEDED
Status: DISCONTINUED | OUTPATIENT
Start: 2021-12-10 | End: 2021-12-10 | Stop reason: HOSPADM

## 2021-12-10 RX ORDER — LIDOCAINE HYDROCHLORIDE 10 MG/ML
INJECTION INFILTRATION; PERINEURAL AS NEEDED
Status: DISCONTINUED | OUTPATIENT
Start: 2021-12-10 | End: 2021-12-10 | Stop reason: HOSPADM

## 2021-12-10 RX ADMIN — SODIUM CHLORIDE 500 ML: 9 INJECTION, SOLUTION INTRAVENOUS at 08:45

## 2021-12-10 NOTE — DISCHARGE INSTRUCTIONS
Radial Cardiac Catheterization / Angiography Discharge Instructions    It is normal to feel tired the first couple days. Take it easy and follow the physicians instructions. CHECK THE CATHETER INSERTION SITE DAILY:  Remove the wrist dressing 24 hours after the procedure. You may shower 24 hours after the procedure. Wash with soap and water and pat dry. Gentle cleaning of the site with soap and water is sufficient, cover with a dry clean dressing or bandage. Do not apply creams or powders to the area. No soaking the wrist for 3 days. Leave the puncture site open to air after 24 hours post-procedure. CALL THE PHYSICIAN:  If the site becomes red, swollen or feels warm to the touch. If there is bleeding or drainage or if there is unusual pain at the radial site. If there is any minor oozing, you may apply a band-aid and remove after 12 hours. If the bleeding continues, hold pressure with the middle finger against the puncture site and the thumb against the back of the wrist, call 911 to be transported to the hospital.  DO NOT DRIVE YOURSELF, [facility]. ACTIVITY:  For the first 24 hours do not manipulate the wrist.  No lifting, pushing or pulling over 3-5 pounds with the affected wrist for 7 days and no straining the insertion site. Do not lift grocery bags or the garbage can, do not run the vacuum  or  for 7 days. Start with short walks as in the hospital and gradually increase as tolerated each day. It is recommended to walk 30 minutes 5-7 days per week. Follow your physicians instructions on activity. Avoid walking outside in extremes of heat or cold. Walk inside when it is cold and windy or hot and humid. THINGS TO KEEP IN MIND:  No driving for at least 24 hours, or as designated by your physician. Limit the number of times you go up and down the stairs  Take rests and pace yourself with activity.   Be careful and do not strain with bowel movements. MEDICATIONS:  Take all medications as prescribed. Call your physician if you have any questions. Keep an updated list of your medications with you at all times and give a list to your physician and pharmacist.    SIGNS AND SYMPTOMS:  Be cautions of symptoms of angina or recurrent symptoms such as chest discomfort, unusual shortness of breath or fatigue. These could be symptoms of restenosis, a new blockage or a heart attack. If your symptoms are relieved with rest it is still recommended that you notify your physician of recurrent chest pain or discomfort. For CHEST PAIN or symptoms of angina not relieved with rest:  If the discomfort is not relieved with rest and you have been prescribed Nitroglycerin, take as directed (taken under the tongue, one at a time 5 minutes apart for a total of 3 doses). If the discomfort is not relieved after the 3rd nitroglycerin, call 911. AFTER CARE:  Follow up with you physician as instructed. Follow a heart healthy diet with proper portion control, daily stress management, daily      exercise, blood pressure and cholesterol control, and smoking cessation.

## 2021-12-10 NOTE — ROUTINE PROCESS
Cardiac Cath Lab Recovery Arrival Note:      Eric Lewis arrived to Cardiac Cath Lab, Recovery Area. Staff introduced to patient. Patient identifiers verified with NAME and DATE OF BIRTH. Procedure verified with patient. Consent forms reviewed and signed by patient or authorized representative and verified. Allergies verified. Patient and family oriented to department. Patient and family informed of procedure and plan of care. Questions answered with review. Patient prepped for procedure, per orders from physician, prior to arrival.    Patient on cardiac monitor, non-invasive blood pressure, SPO2 monitor. On room air. Patient is A&Ox 4. Patient reports no pain. Patient in stretcher, in low position, with side rails up, call bell within reach, patient instructed to call if assistance as needed. Patient prep in: [address], [place]. Family in: Wife in waiting room. Prep by: Magnolia Saab    TRANSFER - IN REPORT:    Verbal report received from Edy Lobo  on Eric Lewis  being received from cath lab for routine progression of care. Report consisted of patients Situation, Background, Assessment and Recommendations(SBAR). Information from the following report(s) SBAR, Procedure Summary and MAR was reviewed with the receiving clinician. Opportunity for questions and clarification was provided. Assessment completed upon patients arrival to [address] [place] [address] and care assumed. Cardiac Cath Lab Recovery Arrival Note:    Eric Lewis arrived to [facility] recovery area. Patient procedure= LHC. Patient on cardiac monitor, non-invasive blood pressure, SPO2 monitor. On room air   IV  of Normal saline on pump at 200 ml/hr. Patient status doing well without problems. Patient is A&Ox 4. Patient reports no pain. PROCEDURE SITE CHECK:    Procedure site:without any bleeding and no hematoma, no pain/discomfort reported at procedure site. No change in patient status.  Continue to monitor patient and status. 1200- Vasc Band removed. No bleeding or hematoma noted. I have reviewed discharge instructions with the patient. The patient verbalized understanding.

## 2021-12-10 NOTE — PROCEDURES
BRIEF PROCEDURE NOTE    Date of Procedure: 12/10/2021   Preoperative Diagnosis: Abnormal stress test  Postoperative Diagnosis: Coronary artery disease without anginal chest pain  Procedure: Left heart cath, coronary angiography  Interventional Cardiologist: Renny Merlin, DO  Assistant: None  Anesthesia: local + IV moderate sedation   I administered moderate sedation throughout this procedure. An independent trained observer pushed medications at my direction, and monitored the patients level of consciousness and physiological status throughout.   Estimated Blood Loss: Minimal    Access: Right radial artery, 6F  Catheters:  Left coronary: JL 3.5, 5F  Right coronary: JR4, 5F    Findings:   L Main: Large-caliber, minimal disease  LAD: Large caliber vessel, calcified, proximal into mid vessel with diffuse mild to moderate disease with mild progression compared to cardiac catheterization in 2013, mid into the distal LAD with a long segment of previously placed stent showing diffuse mild to moderate in-stent restenosis with the apical segment showing severe in-stent restenosis with DARIUS-3 flow into apical LAD, high diagonal shows severe diffuse disease with significant progression compared to prior cardiac catheterization films  LCx: Large caliber vessel, calcified, mild disease through the proximal end AV groove circumflex, very small caliber OM1, large caliber OM 2 with a mid vessel focal 30 to 40% stenosis, subsequently with distal bifurcation with the superior branch with previous coronary intervention demonstrating diffuse mild to moderate disease and inferior branch demonstrating severe diffuse disease with a proximal 80% stenosis  RCA: Large-caliber vessel, calcified, diffuse mild disease throughout the vessel with a focal 70% stenosis by QCA within the distal right coronary artery, small to moderate PDA and small to moderate posterior lateral branch with diffuse moderate disease    LVEDP:  8-10 mmhg         Specimens Removed: None    Implants: Not applicable    Closure Device: radial TR band    See full cath note. Complications: none      Findings:  1. Relatively stable mild to moderate disease of a calcified proximal LAD compared to cardiac catheterization in 2013. 2.  Mid into distal LAD with a long segment of previously placed stent that shows mild to moderate in-stent restenosis with severe in-stent restenosis at the very apical aspect of previously placed stent  3. Focal 70% stenosis in distal right coronary artery  4. Normal LVEDP    Plan:    Proximal LAD and showed minimal progression in disease. Abnormality on stress test likely due to in-stent restenosis of the very distal aspect of the LAD. Recommend deferring any intervention to the apical LAD due to lack of vessel runoff will predispose patient to recurrent restenosis. Recommend deferring IFR guided revascularization of the distal right coronary artery due to lack of chest pain symptoms and the fact patient can be undergoing knee surgery in the near future. I would defer revascularization of the distal right coronary artery unless patient develops anginal chest pain symptoms.       DO Jalen Melgar DO  [facility] [address]                                              Office [phone],[facility] [phone]

## 2021-12-10 NOTE — PROGRESS NOTES
Transfer to [address] [place] [address] from Procedure Area    Verbal report given to Margoth Harrisondy on Tommas Seam being transferred to Cardiac Cath Lab  for routine progression of care   Patient is post left heart cath procedure. Patient stable upon transfer to . Report consisted of patients Situation, Background, Assessment and   Recommendations(SBAR). Information from the following report(s) Procedure Summary, MAR and Recent Results was reviewed with the receiving nurse. Opportunity for questions and clarification was provided. Patient medicated during procedure with orders obtained and verified by Dr. Jorge Villa. Refer to patient PROCEDURE REPORT for vital signs, assessment, status, and response during procedure.

## 2021-12-13 ENCOUNTER — OFFICE VISIT (OUTPATIENT)
Dept: INTERNAL MEDICINE CLINIC | Age: 56
End: 2021-12-13
Payer: COMMERCIAL

## 2021-12-13 VITALS
HEART RATE: 80 BPM | SYSTOLIC BLOOD PRESSURE: 131 MMHG | WEIGHT: 266 LBS | BODY MASS INDEX: 38.08 KG/M2 | TEMPERATURE: 97.9 F | HEIGHT: 70 IN | RESPIRATION RATE: 14 BRPM | DIASTOLIC BLOOD PRESSURE: 75 MMHG | OXYGEN SATURATION: 96 %

## 2021-12-13 DIAGNOSIS — I10 HTN (HYPERTENSION), BENIGN: ICD-10-CM

## 2021-12-13 DIAGNOSIS — I25.10 CORONARY ARTERY DISEASE INVOLVING NATIVE CORONARY ARTERY OF NATIVE HEART WITHOUT ANGINA PECTORIS: ICD-10-CM

## 2021-12-13 DIAGNOSIS — D22.9 MULTIPLE NEVI: ICD-10-CM

## 2021-12-13 DIAGNOSIS — Z00.00 WELL ADULT EXAM: Primary | ICD-10-CM

## 2021-12-13 DIAGNOSIS — E29.1 HYPOGONADISM IN MALE: ICD-10-CM

## 2021-12-13 DIAGNOSIS — I25.119 ATHEROSCLEROSIS OF NATIVE CORONARY ARTERY OF NATIVE HEART WITH ANGINA PECTORIS (HCC): ICD-10-CM

## 2021-12-13 DIAGNOSIS — K21.9 GASTROESOPHAGEAL REFLUX DISEASE WITHOUT ESOPHAGITIS: ICD-10-CM

## 2021-12-13 DIAGNOSIS — N18.30 STAGE 3 CHRONIC KIDNEY DISEASE, UNSPECIFIED WHETHER STAGE 3A OR 3B CKD (HCC): ICD-10-CM

## 2021-12-13 DIAGNOSIS — E78.2 MIXED HYPERLIPIDEMIA: ICD-10-CM

## 2021-12-13 DIAGNOSIS — E11.3393 TYPE 2 DIABETES MELLITUS WITH BOTH EYES AFFECTED BY MODERATE NONPROLIFERATIVE RETINOPATHY WITHOUT MACULAR EDEMA, WITH LONG-TERM CURRENT USE OF INSULIN (HCC): ICD-10-CM

## 2021-12-13 DIAGNOSIS — M19.90 ARTHRITIS: ICD-10-CM

## 2021-12-13 DIAGNOSIS — Z79.4 TYPE 2 DIABETES MELLITUS WITH BOTH EYES AFFECTED BY MODERATE NONPROLIFERATIVE RETINOPATHY WITHOUT MACULAR EDEMA, WITH LONG-TERM CURRENT USE OF INSULIN (HCC): ICD-10-CM

## 2021-12-13 DIAGNOSIS — I48.0 PAROXYSMAL ATRIAL FIBRILLATION (HCC): ICD-10-CM

## 2021-12-13 PROCEDURE — 99396 PREV VISIT EST AGE 40-64: CPT | Performed by: INTERNAL MEDICINE

## 2021-12-13 RX ORDER — METFORMIN HYDROCHLORIDE 1000 MG/1
1000 TABLET ORAL DAILY
COMMUNITY

## 2021-12-13 NOTE — ASSESSMENT & PLAN NOTE
Up-to-date on vaccines. Up-to-date on colonoscopy.   Follows with ophthalmologist, encourage healthy habits

## 2021-12-13 NOTE — ASSESSMENT & PLAN NOTE
He is pleasant, alert, cooperative. Denies headache. No new neurological problems. Review of systems negative in 12 fields    He is able to tell me that he is in a hospital but not the name of the hospital.  He knew the year but thought it was July. monitored by specialist. No acute findings meriting change in the plan

## 2021-12-13 NOTE — ASSESSMENT & PLAN NOTE
monitored by specialist. No acute findings meriting change in the plan, check microalbumin.   Followed by ophthalmologist as well

## 2021-12-13 NOTE — ASSESSMENT & PLAN NOTE
monitored by specialist. No acute findings meriting change in the plan   Per cardiology note, creatinine 1.7 9/14/2021

## 2021-12-13 NOTE — PROGRESS NOTES
Assessment and Plan     1. Well adult exam  Assessment & Plan:  Up-to-date on vaccines. Up-to-date on colonoscopy. Follows with ophthalmologist, encourage healthy habits  2. 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 without macular edema, with long-term current use of insulin (HCC)  Assessment & Plan:   monitored by specialist. No acute findings meriting change in the plan, check microalbumin. Followed by ophthalmologist as well  Orders:  -     MICROALBUMIN, UR, RAND W/ MICROALB/CREAT RATIO; Future  3. Atherosclerosis of native coronary artery of native heart with angina pectoris (HCC)  4. Stage 3 chronic kidney disease, unspecified whether stage 3a or 3b CKD ([facility] [address]  Assessment & Plan:  monitored by specialist. No acute findings meriting change in the plan   Per cardiology note, creatinine 1.7 9/14/2021  5. Coronary artery disease involving native coronary artery of native heart without angina pectoris  Assessment & Plan:   monitored by specialist. No acute findings meriting change in the plan  6. Gastroesophageal reflux disease without esophagitis  Assessment & Plan:   well controlled, continue current medications  7. HTN (hypertension), benign  Assessment & Plan:   well controlled, continue current medications  8. Hypogonadism in male  Assessment & Plan:   monitored by specialist. No acute findings meriting change in the plan  9. Paroxysmal atrial fibrillation (HCC)  Assessment & Plan:   monitored by specialist. No acute findings meriting change in the plan  10. Mixed hyperlipidemia  Assessment & Plan:   well controlled, continue current medications   Last LDL per cards note 85  11. Arthritis  Assessment & Plan:  Planning for knee surgery in February 12.  Multiple nevi  Assessment & Plan:   Follows with dermatologist for skin checks, has had AK frozen previously       Benefits, risks, possible drug interactions, and side effects of all new medications were reviewed with the patient. Pt verbalized understanding. Return to clinic: 1 year for physical or earlier if needed    An electronic signature was used to authenticate this note. Esme Zambrano MD  [facility]  12/13/2021    Future Appointments   Date Time Provider Boby Nickerson   1/6/2022  9:00 AM VASCULAR, COSTELLO ZARA BS AMB   1/6/2022  9:40 AM Isa Roman NP CAVREY BS AMB   2/3/2022  9:30 AM [facility] CT 1 SFMRCT [facility]   3/7/2022  9:20 AM MD ZARA Chang BS AMB   47/19/0217  9:72 AM Richard Colvin MD [facility] BS AMB        History of Present Illness   Chief Complaint   Physical    Jack Purdy is a 55 y.o. male         Review of Systems   Constitutional: Negative for chills and fever. HENT: Negative for hearing loss. Eyes: Negative for blurred vision. Respiratory: Negative for shortness of breath. Cardiovascular: Negative for chest pain. Gastrointestinal: Negative for abdominal pain, blood in stool, constipation, diarrhea, melena, nausea and vomiting. Genitourinary: Negative for dysuria and hematuria. Musculoskeletal: Positive for joint pain. Skin: Negative for rash. Neurological: Negative for headaches. Past Medical History     Allergies   Allergen Reactions    Cleocin [Clindamycin Hcl] Rash        Current Outpatient Medications   Medication Sig    metFORMIN (GLUCOPHAGE) 1,000 mg tablet Take 1,000 mg by mouth daily.  nitroglycerin (NITROSTAT) 0.4 mg SL tablet 1 Tablet by SubLINGual route every five (5) minutes as needed for Chest Pain for up to 3 doses.     clopidogreL (PLAVIX) 75 mg tab Take 1 tablet by mouth once daily    nebivoloL (BYSTOLIC) 10 mg tablet TAKE 1 TABLET BY MOUTH ONCE DAILY PATIENT NEEDS  APPOINTMENT    fosinopriL (MONOPRIL) 40 mg tablet TAKE 1 TABLET BY MOUTH ONCE DAILY - PATIENT NEEDS APPOINTMENT    hydroCHLOROthiazide (HYDRODIURIL) 25 mg tablet TAKE 1 TABLET BY MOUTH ONCE DAILY PT  NEEDS  APPOINTMENT    dilTIAZem ER (CARDIZEM CD) 240 mg capsule TAKE 1 CAPSULE BY MOUTH ONCE DAILY NEEDS  APPOINTMENT    rosuvastatin (CRESTOR) 40 mg tablet TAKE 1 TABLET BY MOUTH ONCE DAILY -  PATIENT  NEEDS  APPOINTMENT    Vascepa 1 gram capsule TAKE 2 CAPSULES BY MOUTH TWICE DAILY WITH MEALS . APPOINTMENT REQUIRED FOR FUTURE REFILLS    Vitamin D3 50 mcg (2,000 unit) cap capsule Take 1 capsule by mouth once daily    DULoxetine (Cymbalta) 30 mg capsule Take 1 Cap by mouth daily.  miSOPROStoL (CYTOTEC) 200 mcg tablet Take 1 Tab by mouth daily.  FreeStyle Almita 14 Day Sensor kit CHECK BLOOD SUGAR AS DIRECTED. CHANGE SENSOR EVERY 14 DAYS    V-GO 20 vinicio     OTHER humulog insulin 20 units/24 hours continuously via insulin pump.  OTHER humulog insulin sliding tid with meals.  semaglutide (OZEMPIC SC) by SubCUTAneous route every seven (7) days.  JARDIANCE 25 mg tablet Take 25 mg by mouth daily.  ONETOUCH ULTRA2 monitoring kit     pantoprazole (PROTONIX) 40 mg tablet Take 1 Tab by mouth daily.  ONETOUCH ULTRA TEST strip     TESTOSTERONE IM by IntraMUSCular route. Every 10 weeks. Aveed    multivitamin (ONE A DAY) tablet Take 1 Tablet by mouth daily. No current facility-administered medications for this visit.           Patient Active Problem List   Diagnosis Code    Type 2 diabetes mellitus with stage 3 chronic kidney disease, with long-term current use of insulin ([facility]) E11.22, N18.30, Z79.4    HTN (hypertension), benign I10    Mixed hyperlipidemia E78.2    Coronary artery disease involving native coronary artery of native heart without angina pectoris I25.10    Gastroesophageal reflux disease without esophagitis K21.9    Class 2 severe obesity due to excess calories with serious comorbidity and body mass index (BMI) of 35.0 to 35.9 in adult ([facility]) E66.01, Z68.35    ZAK (obstructive sleep apnea) G47.33    Colon polyp K63.5    Paroxysmal atrial fibrillation ([facility]) I48.0    Other male erectile dysfunction N52.8    Hypogonadism in male E29.1    S/P ablation of atrial fibrillation Z98.890, Z86.79    Cholelithiasis without cholecystitis K80.20    Gastroparesis K31.84    Type 2 diabetes with nephropathy (HCC) E11.21    CKD (chronic kidney disease) stage 3, GFR 30-59 ml/min ([facility]) N18.30    Arthritis M19.90    History of MI (myocardial infarction) I25.2    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 without macular edema, with long-term current use of insulin ([facility] [address] N47.2405, Z79.4    Angina pectoris, unspecified I20.9    Atherosclerotic heart disease of native coronary artery with unspecified angina pectoris I25.119    Multiple nevi D22.9    Well adult exam Z00.00     Past Surgical History:   Procedure Laterality Date    HX AFIB ABLATION      HX ANGIOPLASTY      HX HEART CATHETERIZATION Left 12/10/2021    HX ORTHOPAEDIC  2007    L Knee Arthroscopy    CO CARDIAC SURG PROCEDURE UNLIST      4 stents placed 2009,2011      Social History     Tobacco Use    Smoking status: Never Smoker    Smokeless tobacco: Never Used   Substance Use Topics    Alcohol use: No     Alcohol/week: 0.0 standard drinks      Family History   Problem Relation Age of Onset    OSTEOARTHRITIS Mother     Diabetes Mother     Elevated Lipids Mother     Heart Disease Mother     Hypertension Mother     Elevated Lipids Father     Heart Disease Father     Hypertension Father     Cancer Father         skin and angiosarcoma    Diabetes Maternal Grandmother     Hypertension Brother     Diabetes Brother     Hypertension Brother     Diabetes Brother     Hypertension Brother         Physical Exam   Vitals:       Visit Vitals  /75 (BP 1 Location: Left upper arm, BP Patient Position: Sitting, BP Cuff Size: Adult)   Pulse 80   Temp 97.9 °F (36.6 °C) (Temporal)   Resp 14   Ht 5' 10\" (1.778 m)   Wt 266 lb (120.7 kg)   SpO2 96%   BMI 38.17 kg/m² Physical Exam  Constitutional:       General: He is not in acute distress. Appearance: He is well-developed. HENT:      Right Ear: Tympanic membrane, ear canal and external ear normal.      Left Ear: Tympanic membrane, ear canal and external ear normal.   Eyes:      Extraocular Movements: Extraocular movements intact. Conjunctiva/sclera: Conjunctivae normal.   Neck:      Vascular: No carotid bruit. Cardiovascular:      Rate and Rhythm: Normal rate and regular rhythm. Pulses: Normal pulses. Heart sounds: No murmur heard. No friction rub. No gallop. Pulmonary:      Effort: No respiratory distress. Breath sounds: No wheezing, rhonchi or rales. Abdominal:      General: Bowel sounds are normal. There is no distension. Palpations: Abdomen is soft. There is no hepatomegaly, splenomegaly or mass. Tenderness: There is no abdominal tenderness. There is no guarding. Musculoskeletal:      Cervical back: Neck supple. Lymphadenopathy:      Cervical: No cervical adenopathy. Skin:     General: Skin is warm. Findings: No rash. Neurological:      Mental Status: He is alert.

## 2021-12-14 LAB
ALBUMIN/CREAT UR: <3 MG/G CREAT (ref 0–29)
CREAT UR-MCNC: 87.4 MG/DL
MICROALBUMIN UR-MCNC: <3 UG/ML
SPECIMEN STATUS REPORT, ROLRST: NORMAL

## 2021-12-15 NOTE — PROGRESS NOTES
[employer] message sent.    =  You do not have any protein in your urine, which is a good thing. Please continue taking your medications.

## 2021-12-30 DIAGNOSIS — K21.9 GASTROESOPHAGEAL REFLUX DISEASE WITHOUT ESOPHAGITIS: ICD-10-CM

## 2021-12-30 DIAGNOSIS — E66.01 MORBID OBESITY, UNSPECIFIED OBESITY TYPE (HCC): ICD-10-CM

## 2021-12-30 DIAGNOSIS — I25.10 ATHEROSCLEROSIS OF NATIVE CORONARY ARTERY OF NATIVE HEART WITHOUT ANGINA PECTORIS: ICD-10-CM

## 2021-12-30 DIAGNOSIS — E78.2 MIXED HYPERLIPIDEMIA: ICD-10-CM

## 2021-12-30 DIAGNOSIS — I35.0 AORTIC VALVE STENOSIS, UNSPECIFIED ETIOLOGY: ICD-10-CM

## 2021-12-30 DIAGNOSIS — I10 HTN (HYPERTENSION), BENIGN: ICD-10-CM

## 2021-12-30 DIAGNOSIS — I48.91 ATRIAL FIBRILLATION, UNSPECIFIED TYPE (HCC): ICD-10-CM

## 2022-01-03 RX ORDER — CLOPIDOGREL BISULFATE 75 MG/1
TABLET ORAL
Qty: 30 TABLET | Refills: 0 | Status: SHIPPED | OUTPATIENT
Start: 2022-01-03 | End: 2022-01-27

## 2022-01-03 RX ORDER — ROSUVASTATIN CALCIUM 40 MG/1
TABLET, COATED ORAL
Qty: 30 TABLET | Refills: 0 | Status: SHIPPED | OUTPATIENT
Start: 2022-01-03 | End: 2022-01-27

## 2022-01-03 RX ORDER — DILTIAZEM HYDROCHLORIDE 240 MG/1
CAPSULE, COATED, EXTENDED RELEASE ORAL
Qty: 30 CAPSULE | Refills: 0 | Status: SHIPPED | OUTPATIENT
Start: 2022-01-03 | End: 2022-01-27

## 2022-01-03 RX ORDER — HYDROCHLOROTHIAZIDE 25 MG/1
TABLET ORAL
Qty: 30 TABLET | Refills: 0 | Status: SHIPPED | OUTPATIENT
Start: 2022-01-03 | End: 2022-01-27

## 2022-01-03 RX ORDER — FOSINOPRIL SODIUM 40 MG/1
TABLET ORAL
Qty: 30 TABLET | Refills: 0 | Status: SHIPPED | OUTPATIENT
Start: 2022-01-03 | End: 2022-01-27

## 2022-01-03 RX ORDER — NEBIVOLOL 10 MG/1
TABLET ORAL
Qty: 30 TABLET | Refills: 0 | Status: SHIPPED | OUTPATIENT
Start: 2022-01-03 | End: 2022-01-27

## 2022-01-03 NOTE — TELEPHONE ENCOUNTER
Cardiologist: Dr. Christy Kendrick    Last appt: 11/30/2021  Future Appointments   Date Time Provider Boby Nickerson   1/6/2022  9:00 AM VASCULAR, [place]ANG [place] BS AMB   1/6/2022  9:40 AM Louie Roman, AVE [place] BS AMB   2/3/2022  9:30 AM [facility] CT 1 [facility]CT [facility] [place]   3/7/2022  9:20 AM MD ZARA Wolff BS AMB   [id number]  2:21 AM Missael Diaz MD [facility] BS AMB       Requested Prescriptions     Signed Prescriptions Disp Refills    clopidogreL (PLAVIX) 75 mg tab 30 Tablet 0     Sig: Take 1 tablet by mouth once daily     Authorizing Provider: Lisa Parr     Ordering User: VIVEK SHEPPARD    dilTIAZem ER (CARDIZEM CD) 240 mg capsule 30 Capsule 0     Sig: TAKE 1 CAPSULE BY MOUTH ONCE DAILY (PATIENT  NEEDS  APPOINTMENT)     Authorizing Provider: Lisa Parr     Ordering User: VIVEK SHEPPARD    fosinopriL (MONOPRIL) 40 mg tablet 30 Tablet 0     Sig: TAKE 1 TABLET BY MOUTH ONCE DAILY (PATIENT  NEEDS  APPOINTMENT)     Authorizing Provider: Lisa Parr     Ordering User: VIVEK SHEPPARD    hydroCHLOROthiazide (HYDRODIURIL) 25 mg tablet 30 Tablet 0     Sig: TAKE 1 TABLET BY MOUTH ONCE DAILY (PATIENT  NEEDS  APPOINTMENT)     Authorizing Provider: Lisa Parr     Ordering User: VIVEK SHEPPARD    nebivoloL (BYSTOLIC) 10 mg tablet 30 Tablet 0     Sig: TAKE 1 TABLET BY MOUTH ONCE A DAY (PATIENT NEEDS APPOINTMENT)     Authorizing Provider: Lisa Parr     Ordering User: VIVEK SHEPPARD    rosuvastatin (CRESTOR) 40 mg tablet 30 Tablet 0     Sig: TAKE 1 TABLET BY MOUTH ONCE DAILY (PATIENT  NEEDS  APPOINTMENT)     Authorizing Provider: Lisa Parr     Ordering User: VIVEK SHEPPARD         Refills VO per Dr. Christy Kendrick. Patient called to see if his refill will possible be ready today ,. He stated he called it in on Friday and PSR did inform patient of the refill policy    Thank you

## 2022-01-26 DIAGNOSIS — I10 HTN (HYPERTENSION), BENIGN: ICD-10-CM

## 2022-01-26 DIAGNOSIS — I35.0 AORTIC VALVE STENOSIS, UNSPECIFIED ETIOLOGY: ICD-10-CM

## 2022-01-26 DIAGNOSIS — I25.10 ATHEROSCLEROSIS OF NATIVE CORONARY ARTERY OF NATIVE HEART WITHOUT ANGINA PECTORIS: ICD-10-CM

## 2022-01-26 DIAGNOSIS — I48.91 ATRIAL FIBRILLATION, UNSPECIFIED TYPE (HCC): ICD-10-CM

## 2022-01-26 DIAGNOSIS — K21.9 GASTROESOPHAGEAL REFLUX DISEASE WITHOUT ESOPHAGITIS: ICD-10-CM

## 2022-01-26 DIAGNOSIS — E78.2 MIXED HYPERLIPIDEMIA: ICD-10-CM

## 2022-01-26 DIAGNOSIS — E66.01 MORBID OBESITY, UNSPECIFIED OBESITY TYPE (HCC): ICD-10-CM

## 2022-01-27 RX ORDER — ROSUVASTATIN CALCIUM 40 MG/1
40 TABLET, COATED ORAL
Qty: 30 TABLET | Refills: 0 | Status: SHIPPED | OUTPATIENT
Start: 2022-01-27 | End: 2022-01-28 | Stop reason: SDUPTHER

## 2022-01-27 RX ORDER — DILTIAZEM HYDROCHLORIDE 240 MG/1
CAPSULE, COATED, EXTENDED RELEASE ORAL
Qty: 30 CAPSULE | Refills: 0 | Status: SHIPPED | OUTPATIENT
Start: 2022-01-27 | End: 2022-01-28 | Stop reason: SDUPTHER

## 2022-01-27 RX ORDER — NEBIVOLOL 10 MG/1
10 TABLET ORAL DAILY
Qty: 30 TABLET | Refills: 0 | Status: SHIPPED | OUTPATIENT
Start: 2022-01-27 | End: 2022-01-28 | Stop reason: SDUPTHER

## 2022-01-27 RX ORDER — CLOPIDOGREL BISULFATE 75 MG/1
TABLET ORAL
Qty: 30 TABLET | Refills: 0 | Status: SHIPPED | OUTPATIENT
Start: 2022-01-27 | End: 2022-01-28 | Stop reason: SDUPTHER

## 2022-01-27 RX ORDER — FOSINOPRIL SODIUM 40 MG/1
TABLET ORAL
Qty: 30 TABLET | Refills: 0 | Status: SHIPPED | OUTPATIENT
Start: 2022-01-27 | End: 2022-01-28 | Stop reason: SDUPTHER

## 2022-01-27 RX ORDER — HYDROCHLOROTHIAZIDE 25 MG/1
TABLET ORAL
Qty: 30 TABLET | Refills: 0 | Status: SHIPPED | OUTPATIENT
Start: 2022-01-27 | End: 2022-01-28 | Stop reason: SDUPTHER

## 2022-01-27 NOTE — TELEPHONE ENCOUNTER
Cardiologist: Dr. Kehinde Alvarez    Last appt: 11/30/2021  Future Appointments   Date Time Provider Boby Nickerson   1/28/2022 10:00 AM [facility]VE [place] BS AMB   2/3/2022  9:30 AM [facility] CT 1 [facility]   3/7/2022  9:20 AM MD ZARA Barragan BS AMB   [id number]/25/2770  5:62 AM Alicia Eugene MD [facility] BS AMB       Requested Prescriptions     Signed Prescriptions Disp Refills    fosinopriL (MONOPRIL) 40 mg tablet 30 Tablet 0     Sig: TAKE 1 TABLET BY MOUTH ONCE DAILY     Authorizing Provider: Katie Hartley User: Mao Dover clopidogreL (PLAVIX) 75 mg tab 30 Tablet 0     Sig: Take 1 tablet by mouth once daily     Authorizing Provider: Katie Hartley User: VIVEK SHEPPARD    dilTIAZem ER (CARDIZEM CD) 240 mg capsule 30 Capsule 0     Sig: TAKE 1 CAPSULE BY MOUTH ONCE DAILY     Authorizing Provider: Katie Sarabia     Ordering User: VIVEK SHEPPARD    hydroCHLOROthiazide (HYDRODIURIL) 25 mg tablet 30 Tablet 0     Sig: TAKE 1 TABLET BY MOUTH ONCE DAILY     Authorizing Provider: Katie Sarabia     Ordering User: VIVEK SHEPPARD    nebivoloL (BYSTOLIC) 10 mg tablet 30 Tablet 0     Sig: Take 1 Tablet by mouth daily. Authorizing Provider: Katie Hartley User: VIVEK Deng    rosuvastatin (CRESTOR) 40 mg tablet 30 Tablet 0     Sig: Take 1 Tablet by mouth nightly. Authorizing Provider: Katie Hartley User: VIVEK SHEPPARD         Refills VO per Dr. Kehinde Alvarez.

## 2022-01-28 ENCOUNTER — OFFICE VISIT (OUTPATIENT)
Dept: CARDIOLOGY CLINIC | Age: 57
End: 2022-01-28
Payer: COMMERCIAL

## 2022-01-28 VITALS
BODY MASS INDEX: 38.05 KG/M2 | HEIGHT: 70 IN | DIASTOLIC BLOOD PRESSURE: 80 MMHG | HEART RATE: 76 BPM | RESPIRATION RATE: 14 BRPM | WEIGHT: 265.8 LBS | SYSTOLIC BLOOD PRESSURE: 130 MMHG | OXYGEN SATURATION: 98 %

## 2022-01-28 DIAGNOSIS — N52.01 ERECTILE DYSFUNCTION DUE TO ARTERIAL INSUFFICIENCY: ICD-10-CM

## 2022-01-28 DIAGNOSIS — I25.83 CORONARY ARTERY DISEASE DUE TO LIPID RICH PLAQUE: Primary | ICD-10-CM

## 2022-01-28 DIAGNOSIS — R09.89 BRUIT OF LEFT CAROTID ARTERY: ICD-10-CM

## 2022-01-28 DIAGNOSIS — E78.1 HYPERTRIGLYCERIDEMIA: ICD-10-CM

## 2022-01-28 DIAGNOSIS — I10 HTN (HYPERTENSION), BENIGN: ICD-10-CM

## 2022-01-28 DIAGNOSIS — N18.31 STAGE 3A CHRONIC KIDNEY DISEASE (HCC): ICD-10-CM

## 2022-01-28 DIAGNOSIS — I48.0 PAROXYSMAL ATRIAL FIBRILLATION (HCC): ICD-10-CM

## 2022-01-28 DIAGNOSIS — E66.01 SEVERE OBESITY (BMI 35.0-35.9 WITH COMORBIDITY) (HCC): ICD-10-CM

## 2022-01-28 DIAGNOSIS — Z01.810 PRE-OPERATIVE CARDIOVASCULAR EXAMINATION: ICD-10-CM

## 2022-01-28 DIAGNOSIS — I25.10 CORONARY ARTERY DISEASE DUE TO LIPID RICH PLAQUE: Primary | ICD-10-CM

## 2022-01-28 DIAGNOSIS — I35.0 NONRHEUMATIC AORTIC VALVE STENOSIS: ICD-10-CM

## 2022-01-28 DIAGNOSIS — E78.2 MIXED HYPERLIPIDEMIA: ICD-10-CM

## 2022-01-28 PROCEDURE — 99214 OFFICE O/P EST MOD 30 MIN: CPT | Performed by: NURSE PRACTITIONER

## 2022-01-28 RX ORDER — FOSINOPRIL SODIUM 40 MG/1
TABLET ORAL
Qty: 90 TABLET | Refills: 1 | Status: SHIPPED | OUTPATIENT
Start: 2022-01-28 | End: 2022-07-28

## 2022-01-28 RX ORDER — HYDROCHLOROTHIAZIDE 25 MG/1
TABLET ORAL
Qty: 90 TABLET | Refills: 1 | Status: SHIPPED | OUTPATIENT
Start: 2022-01-28 | End: 2022-07-28

## 2022-01-28 RX ORDER — NEBIVOLOL 10 MG/1
10 TABLET ORAL DAILY
Qty: 90 TABLET | Refills: 1 | Status: SHIPPED | OUTPATIENT
Start: 2022-01-28 | End: 2022-02-16

## 2022-01-28 RX ORDER — ROSUVASTATIN CALCIUM 40 MG/1
40 TABLET, COATED ORAL
Qty: 90 TABLET | Refills: 1 | Status: SHIPPED | OUTPATIENT
Start: 2022-01-28 | End: 2022-07-28

## 2022-01-28 RX ORDER — CLOPIDOGREL BISULFATE 75 MG/1
75 TABLET ORAL DAILY
Qty: 90 TABLET | Refills: 1 | Status: SHIPPED | OUTPATIENT
Start: 2022-01-28 | End: 2022-04-28 | Stop reason: ALTCHOICE

## 2022-01-28 RX ORDER — SILDENAFIL 100 MG/1
TABLET, FILM COATED ORAL
Qty: 30 TABLET | Refills: 0
Start: 2022-01-28 | End: 2022-04-28

## 2022-01-28 RX ORDER — DILTIAZEM HYDROCHLORIDE 240 MG/1
CAPSULE, COATED, EXTENDED RELEASE ORAL
Qty: 90 CAPSULE | Refills: 1 | Status: SHIPPED | OUTPATIENT
Start: 2022-01-28 | End: 2022-07-28

## 2022-01-28 NOTE — PATIENT INSTRUCTIONS
Please have labs drawn in the next week to check your renal function and cholesterol       Please take your last dose of plavix/clopidogrel and vascepa on 2/11 with plans to restart them the day following surgery

## 2022-01-28 NOTE — PROGRESS NOTES
[facility]  DIVISION OF CARDIOLOGY  [facility]              OFFICE NOTE          Karolina Herrera   1965  [id number]    Date/Time:  1/28/2022  Cardiologist: Frederick Hopson M.D.,GHISLAINE.    SUBJECTIVE:    Discussed cardiac cath results, distal coronary lesions without PCI at this time  He denies any chest pain or palpitations  No increase in dyspnea with exertion  Has very minimal LE edema  Continues with ED - says viagra is not very effective, recently stopped testosterone injections due to lack of positive impact on symptoms, advised him to return to urology to discuss other options  He plans to have right knee surgery 2/17/22 at [facility] with Dr. 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 dronedarone stopped on 1/1/16. Previous holter with no AF and NSR.  His [id number] VASC score is 3 with 3.2 % cva risk but he prefers to continue plavix at this time, does not want to take [address], this was previously discussed with Dr. Verónica Cortés and patient agreed to contact the office right away if he has any recurrent  palpitations, fatigue, dyspnea, he told Dr. Verónica Cortés he felt great in NSR and he was very symptomatic when in AFib, previously followed by EP/       CAD: some distal LAD and RCA lesions on recent cardiac cath but no PCI performed (see cath note)  -continue Plavix, Bystolic, rosuvastatin  -has Rx for nitroglycerin sublingual tablets to take as needed for chest pain, previously advised him not to take the nitroglycerin within 24 hours of his viagra and vice versa  -he will follow up with Dr. Gianluca Noe in April 2022    HTN:  well controlled on bystolic, fosinopril, hydrochlorothiazide, diltiazem CD     Hyperlipidemia/hypertriglyceridemia: on rosuvastatin and vascepa, off fenofibrate due to decreased renal function, per patient lipids are followed by cardiology, not PCP or endocrinology, will check fasting lipids now      AS: mild to mod AS, mild to mod AI, discussed results of recent echocardiogram     Severe obesity: discussed importance of weight loss and exercise, he is currently limited due to his osteoarthritis in the knee     ZAK: on cpap     AODM: closely followed by endocrinologist/Dr. Daniele Santana    CKD stage 3: will check BMP now after dye load with cardiac cath     Left carotid bruit: We will perform carotid duplex study at his follow-up visit with Dr. Gianluca Noe in April 2022 to assess for PAD    ED: on sildenafil but ineffective, advised him to return to urology to discuss options     Vitamin D deficiency: managed by primary care per patient     Surgical clearance: ok to proceed with knee surgery as above                HPI   48 y.o. male. history of HTN , HLP, CAD/MI in 2008. S/p PCI LAD in 2008 and additional stent in LAD in 2011. He also has residual 70% RV branch stenosis. Admitted to [facility] with AF and RVR on 7/15 nuclear stress test at that time with no ischemia, apical attenuation ( sub endo mi) and EF 54%. Echo also on 7/15: EF 55-60% no rwma,  bicuspid AV and mild AS    Admitted to [facility] on 7/15 for recurrent AF. DOLORES showed no NEWTON, normal LV and tri leaflet AV with possible partial fusion of 2 leaflets, mild AS, AI and MR .  He failed cardioversion with persistent AF   successful dc cardioversion on 7/15 while on multaq    Recurrent AF afterwards on 7/15  and eventually AF ablation with Dr. Rob Lopez on 9/15    Stopped toprol on his own accord on 11/15 on account of excessive sob    He has GB disease and stones 2015  Echo on 8/16:Left ventricle: Size was normal. Systolic function was normal by visual  assessment. Ejection fraction was estimated to be 60 %. There were no  regional wall motion abnormalities. Wall thickness was mildly increased. Aortic valve: There was an apparent echogenic mass of tissue appearing in  the outflow tract adherent to the right coronary cusp; unknown if  clinically significant. Leaflets exhibited moderately increased thickness,  moderate calcification, and mildly reduced cuspal separation. Transaortic  velocity was increased due to valvular stenosis. There was mild stenosis. VIRA was 2.0 cm2, peak/mean gradients were 29/17 mmHg, and the DI was 0.45. There was mild regurgitation. Aorta, systemic arteries: The root exhibited normal size; however, the  ascending aorta was mildly enlarged; sinus of valsalva was 3.8 cm, the  ST-J was 3.5 cm, and the ascending aorta was 4.0 cm. ER visit for cp on 5/17 normal w/u but elevated BP    NUCLEAR STRESS TEST on 6/2/17 no gross ischemia fixed apical and infero apical defect EF 60%     ECHO on 5/17: EF 60% mild AS mean gradient of 14 mmhg mild NV    CARDIAC STUDIES    CARDIAC PROCEDURE 12/11/2021     Conclusion  · Relatively stable mild to moderate disease of a calcified proximal LAD compared to cardiac catheterization in 2013.   · Mid into distal LAD with a long segment of previously placed stent that shows mild to moderate in-stent restenosis with severe in-stent restenosis at the very apical aspect of previously placed stent  · Focal 70% stenosis in distal right coronary artery  · Normal LVEDP    Access: Right radial artery, 6F  Catheters:  Left coronary: JL 3.5, 5F  Right coronary: JR4, 5F    Findings:  L Main: Large-caliber, minimal disease  LAD: Large caliber vessel, calcified, proximal into mid vessel with diffuse mild to moderate disease with mild progression compared to cardiac catheterization in 2013, mid into the distal LAD with a long segment of previously placed stent showing diffuse mild to moderate in-stent restenosis with the apical segment showing severe in-stent restenosis with DARIUS-3 flow into apical LAD, high diagonal shows severe diffuse disease with significant progression compared to prior cardiac catheterization films  LCx: Large caliber vessel, calcified, mild disease through the proximal end AV groove circumflex, very small caliber OM1, large caliber OM 2 with a mid vessel focal 30 to 40% stenosis, subsequently with distal bifurcation with the superior branch with previous coronary intervention demonstrating diffuse mild to moderate disease and inferior branch demonstrating severe diffuse disease with a proximal 80% stenosis  RCA: Large-caliber vessel, calcified, diffuse mild disease throughout the vessel with a focal 70% stenosis by QCA within the distal right coronary artery, small to moderate PDA and small to moderate posterior lateral branch with diffuse moderate disease    LVEDP:  8-10 mmhg      Plan:  Proximal LAD and showed minimal progression in disease. Abnormality on stress test likely due to in-stent restenosis of the very distal aspect of the LAD. Recommend deferring any intervention to the apical LAD due to lack of vessel runoff will predispose patient to recurrent restenosis. Recommend deferring IFR guided revascularization of the distal right coronary artery due to lack of chest pain symptoms and the fact patient can be undergoing knee surgery in the near future. I would defer revascularization of the distal right coronary artery unless patient develops anginal chest pain symptoms.     Signed by: Jesusita Chung DO on 12/11/2021  2:09 PM    NUCLEAR CARDIAC STRESS TEST 11/23/2021     Interpretation Summary  · SPECT: Left ventricular function post-stress was normal. Calculated ejection fraction is 63% (normal EF value is equal to or greater than 50%). Left ventricular wall motion was normal at stress, no regional wall motion abnormality noted. There is no evidence of transient ischemic dilation (TID). The TID ratio is 1.06.  · Baseline ECG: Normal sinus rhythm, rsr.  · SPECT: Myocardial perfusion imaging defect 1: There is a defect that is small to medium in size present in the apical segment(s) of the anterior wall(s) that is partially reversible. There is normal wall motion in the defect area. Viability in the area is good. The defect appears to probably be ischemia. · SPECT: Left ventricular perfusion is abnormal. Myocardial perfusion imaging supports an intermediate risk stress test.  · Mixed infarction and ischemia in a small to medium area of distal anterior wall and apex  · EF preserved at 63%    Signed by: Lili Zaidi MD on 11/23/2021  4:58 PM    11/23/21    ECHO ADULT COMPLETE 11/24/2021 11/24/2021    Interpretation Summary  · LV: Estimated LVEF is 60 - 65%. Visually measured ejection fraction. Normal cavity size and systolic function (ejection fraction normal). Moderate concentric hypertrophy. Mild (grade 1) left ventricular diastolic dysfunction. · LA: Mildly dilated left atrium. · AV: Probably trileaflet aortic valve. Moderate aortic valve leaflet calcification present with reduced excursion. Aortic valve mean gradient is 16 mmHg. Aortic valve area is 1.4 cm2. Mild to moderate aortic valve stenosis is present. Mild to moderate aortic valve regurgitation is present. · Image quality for this study was technically difficult. · Contrast used: DEFINITY. Signed by: Lili Zaidi MD on 11/24/2021  1:56 PM      EKG Results     None        IMAGING      MRI Results (most recent):  Results from [place] encounter on 08/25/15    MRA CHEST W CONT    Narrative  **Final Report**      ICD Codes / Adm. Diagnosis: 427.31  401.1 / Atrial fibrillation [facility])  Examination:  MR CHEST MRA W CON  - [id number] - Aug 25 2015 12:05PM  Accession No:  [id number]  Reason:  preop      REPORT:    CARDIOVASCULAR MRI    INDICATION: preop    PROCEDURAL DATA:    CPT Codes: 86377    SCANS PERFORMED:  Spin Echo: Axial.  Pulmonary Vein Angiogram    Contrast Agent: Magnevist.  Contrast Dose: 40ml. CLINICAL DATA:  HR = 74/min, BP = 135/91mmHg, HT = 5 feet 10, WT = 274lb. Impression  :    1. All pulmonary veins visualized draining the left atrium. There is no  accessory pulmonary veins. There is no sinus venosus defect. The left upper  and lower pulmonary vein drain close to each other without significant  separation. The esophagus courses behind the left atrium close to the os of  the left upper and lower pulmonary veins. The individual pulmonary vein  ostial dimensions are as follows:    Right upper pulmonary vein: 23 x 21 mm. Right lower pulmonary vein: 28 x 21 mm. Left upper pulmonary vein: 19 x 18 mm. Left lower pulmonary vein: 25 x 19 mm.  2. These is no left atrial appendage thrombus. 3. Normal origin of the great vessels of the aortic arch. Normal ascending  aorta and descending thoracic aorta without aneurysm or dissection. Signing/Reading Doctor: Marquis Slaughter ([id number])  Viviane Owens ([id number])  Aug 25 2015  4:22PM      CT Results (most recent):  Results from [place] encounter on 10/01/15    CT NECK SOFT TISSUE W CONT    Narrative  **Final Report**      ICD Codes / Adm. Diagnosis: V45.82  Z98.61 / Abscess  Neck abcess  Examination:  CT SOFT TISSUE NECK W CON  - [id number] - Oct  1 2015  9:43PM  Accession No:  [id number]  Reason:  Pain      REPORT:  EXAM:  CT SOFT TISSUE NECK W CON    INDICATION: Postsurgical pain and neck abscess. COMPARISON: None. CONTRAST: 97 mL of Isovue-300.  The patient was instructed to discontinue  metformin for 48 hours and check with his physician before resuming the  medication. TECHNIQUE: Multislice helical CT was performed from the mid calvarium to the  pulmonary massimo during uneventful rapid bolus intravenous contrast  administration. Contiguous 2.5 mm axial images were reconstructed and lung  and soft tissue windows were generated. Coronal reformations were generated. CT dose reduction was achieved through use of a standardized protocol  tailored for this examination and automatic exposure control for dose  modulation. FINDINGS:    No mass or adenopathy is identified within the neck. The carotid and jugular vessels enhance normally. The patient has a large  neck and there are engorged veins in the neck bilaterally. The thyroid gland, submandibular salivary glands and parotid glands are  normal.    No nasopharyngeal, pharyngeal or laryngeal mass is identified. No abnormalities are identified in the visualized portions of the brain or  orbits. The visualized mastoid air cells and paranasal sinuses are clear. The visualized portions of the lung apices are clear. There are mild degenerative changes of the upper thoracic spine. Impression  : No mass, adenopathy or abscess. Signing/Reading Doctor: Dennise Cornejo ([id number])  Approved: Dennise Cornejo ([id number])  Oct  1 2015  9:56PM      XR Results (most recent):  Results from Hospital Encounter encounter on 01/21/21    XR KNEE LT MAX 2 VWS    Narrative  INDICATION:  left patellar edema, left leg edema    Exam: AP, lateral views of the left knee. FINDINGS: There is no acute fracture-dislocation. There are small  tricompartmental osteophytes. . Bones are well-mineralized. No suprapatellar  joint fluid is visualized. Impression  No acute fracture or dislocation.           Past Medical History:   Diagnosis Date    Arthritis     HTN (hypertension), benign 3/31/2010    Mixed hyperlipidemia 3/31/2010    ZAK (obstructive sleep apnea) 3/31/2010     Past Surgical History:   Procedure Laterality Date    HX AFIB ABLATION      HX ANGIOPLASTY      HX HEART CATHETERIZATION Left 12/10/2021    HX ORTHOPAEDIC  2007    L Knee Arthroscopy    [place] CARDIAC SURG PROCEDURE UNLIST      4 stents placed 2009,2011     Social History     Tobacco Use    Smoking status: Never Smoker    Smokeless tobacco: Never Used   Substance Use Topics    Alcohol use: No     Alcohol/week: 0.0 standard drinks    Drug use: No     Family History   Problem Relation Age of Onset    OSTEOARTHRITIS Mother     Diabetes Mother     Elevated Lipids Mother     Heart Disease Mother     Hypertension Mother     Elevated Lipids Father     Heart Disease Father     Hypertension Father     Cancer Father         skin and angiosarcoma    Diabetes Maternal Grandmother     Hypertension Brother     Diabetes Brother     Hypertension Brother     Diabetes Brother     Hypertension Brother      Allergies   Allergen Reactions    Cleocin [Clindamycin Hcl] Rash         Visit Vitals  /80 (BP 1 Location: Right arm, BP Patient Position: Sitting, BP Cuff Size: Adult)   Pulse 76   Resp 14   Ht 5' 10\" (1.778 m)   Wt 265 lb 12.8 oz (120.6 kg)   SpO2 98%   BMI 38.14 kg/m²         Last 3 Recorded Weights in this Encounter    01/28/22 0957   Weight: 265 lb 12.8 oz (120.6 kg)            Review of Systems:   Pertinent items are noted in the History of Present Illness. Neck: no JVD and left carotid bruit present   Heart: regular rate and rhythm, systolic murmur: systolic ejection 3/6, crescendo at 2nd right intercostal space  Lungs: clear to auscultation bilaterally  Abdomen: soft, non-tender. Bowel sounds normal. No masses,  no organomegaly  Extremities: no edema      Current Outpatient Medications on File Prior to Visit   Medication Sig Dispense Refill    metFORMIN (GLUCOPHAGE) 1,000 mg tablet Take 1,000 mg by mouth daily.       nitroglycerin (NITROSTAT) 0.4 mg SL tablet 1 Tablet by SubLINGual route every five (5) minutes as needed for Chest Pain for up to 3 doses. 25 Each 1    Vascepa 1 gram capsule TAKE 2 CAPSULES BY MOUTH TWICE DAILY WITH MEALS . APPOINTMENT REQUIRED FOR FUTURE REFILLS 120 Capsule 0    Vitamin D3 50 mcg (2,000 unit) cap capsule Take 1 capsule by mouth once daily 90 Cap 3    DULoxetine (Cymbalta) 30 mg capsule Take 1 Cap by mouth daily.  miSOPROStoL (CYTOTEC) 200 mcg tablet Take 1 Tab by mouth daily.  FreeStyle Almita 14 Day Sensor kit CHECK BLOOD SUGAR AS DIRECTED. CHANGE SENSOR EVERY 14 DAYS      V-GO 20 vinicio       OTHER humulog insulin 20 units/24 hours continuously via insulin pump.  OTHER humulog insulin sliding tid with meals.  semaglutide (OZEMPIC SC) by SubCUTAneous route every seven (7) days.  JARDIANCE 25 mg tablet Take 25 mg by mouth daily.  ONETOUCH ULTRA2 monitoring kit       pantoprazole (PROTONIX) 40 mg tablet Take 1 Tab by mouth daily. 1 Tab 1    ONETOUCH ULTRA TEST strip       multivitamin (ONE A DAY) tablet Take 1 Tablet by mouth daily.  [DISCONTINUED] fosinopriL (MONOPRIL) 40 mg tablet TAKE 1 TABLET BY MOUTH ONCE DAILY 30 Tablet 0    [DISCONTINUED] clopidogreL (PLAVIX) 75 mg tab Take 1 tablet by mouth once daily 30 Tablet 0    [DISCONTINUED] dilTIAZem ER (CARDIZEM CD) 240 mg capsule TAKE 1 CAPSULE BY MOUTH ONCE DAILY 30 Capsule 0    [DISCONTINUED] hydroCHLOROthiazide (HYDRODIURIL) 25 mg tablet TAKE 1 TABLET BY MOUTH ONCE DAILY 30 Tablet 0    [DISCONTINUED] nebivoloL (BYSTOLIC) 10 mg tablet Take 1 Tablet by mouth daily. 30 Tablet 0    [DISCONTINUED] rosuvastatin (CRESTOR) 40 mg tablet Take 1 Tablet by mouth nightly. 30 Tablet 0    [DISCONTINUED] TESTOSTERONE IM by IntraMUSCular route. Every 10 weeks. Aveed       No current facility-administered medications on file prior to visit. Madiha Hough. Pierre Moyer had no medications administered during this visit.      Current Outpatient Medications   Medication Sig    rosuvastatin (CRESTOR) 40 mg tablet Take 1 Tablet by mouth nightly.  nebivoloL (BYSTOLIC) 10 mg tablet Take 1 Tablet by mouth daily.  hydroCHLOROthiazide (HYDRODIURIL) 25 mg tablet TAKE 1 TABLET BY MOUTH ONCE DAILY    dilTIAZem ER (CARDIZEM CD) 240 mg capsule TAKE 1 CAPSULE BY MOUTH ONCE DAILY    clopidogreL (PLAVIX) 75 mg tab Take 1 Tablet by mouth daily.  fosinopriL (MONOPRIL) 40 mg tablet TAKE 1 TABLET BY MOUTH ONCE DAILY    sildenafil citrate (Viagra) 100 mg tablet Take as directed    metFORMIN (GLUCOPHAGE) 1,000 mg tablet Take 1,000 mg by mouth daily.  nitroglycerin (NITROSTAT) 0.4 mg SL tablet 1 Tablet by SubLINGual route every five (5) minutes as needed for Chest Pain for up to 3 doses.  Vascepa 1 gram capsule TAKE 2 CAPSULES BY MOUTH TWICE DAILY WITH MEALS . APPOINTMENT REQUIRED FOR FUTURE REFILLS    Vitamin D3 50 mcg (2,000 unit) cap capsule Take 1 capsule by mouth once daily    DULoxetine (Cymbalta) 30 mg capsule Take 1 Cap by mouth daily.  miSOPROStoL (CYTOTEC) 200 mcg tablet Take 1 Tab by mouth daily.  FreeStyle Almita 14 Day Sensor kit CHECK BLOOD SUGAR AS DIRECTED. CHANGE SENSOR EVERY 14 DAYS    V-GO 20 vinicio     OTHER humulog insulin 20 units/24 hours continuously via insulin pump.  OTHER humulog insulin sliding tid with meals.  semaglutide (OZEMPIC SC) by SubCUTAneous route every seven (7) days.  JARDIANCE 25 mg tablet Take 25 mg by mouth daily.  ONETOUCH ULTRA2 monitoring kit     pantoprazole (PROTONIX) 40 mg tablet Take 1 Tab by mouth daily.  ONETOUCH ULTRA TEST strip     multivitamin (ONE A DAY) tablet Take 1 Tablet by mouth daily. No current facility-administered medications for this visit.          Lab Results   Component Value Date/Time    Cholesterol, total 139 09/11/2019 08:50 AM    HDL Cholesterol 36 (L) 09/11/2019 08:50 AM    LDL,Direct 80 07/10/2015 04:44 AM    LDL, calculated 50 09/11/2019 08:50 AM    VLDL, calculated 53 (H) 09/11/2019 08:50 AM    Triglyceride 266 (H) 09/11/2019 08:50 AM CHOL/HDL Ratio 5.6 (H) 07/10/2015 04:44 AM       Lab Results   Component Value Date/Time    Sodium 138 12/07/2021 08:42 AM    Potassium 4.4 12/07/2021 08:42 AM    Chloride 98 12/07/2021 08:42 AM    CO2 26 12/07/2021 08:42 AM    Anion gap 6 11/18/2019 08:10 AM    Glucose 136 (H) 12/07/2021 08:42 AM    BUN 28 (H) 12/07/2021 08:42 AM    Creatinine 1.71 (H) 12/07/2021 08:42 AM    BUN/Creatinine ratio 16 12/07/2021 08:42 AM    GFR est AA 51 (L) 12/07/2021 08:42 AM    GFR est non-AA 44 (L) 12/07/2021 08:42 AM    Calcium 9.9 12/07/2021 08:42 AM       Lab Results   Component Value Date/Time    ALT (SGPT) 30 09/11/2019 08:50 AM    Alk.  phosphatase 34 (L) 09/11/2019 08:50 AM    Bilirubin, direct 0.14 09/11/2019 08:50 AM    Bilirubin, total 0.3 09/11/2019 08:50 AM       Lab Results   Component Value Date/Time    WBC 7.3 12/07/2021 08:42 AM    Hemoglobin (POC) 14.6 01/24/2010 02:15 AM    HGB 16.5 12/07/2021 08:42 AM    Hematocrit (POC) 43 01/24/2010 02:15 AM    HCT 48.8 12/07/2021 08:42 AM    PLATELET 934 27/47/5356 08:42 AM    MCV 86 12/07/2021 08:42 AM       Lab Results   Component Value Date/Time    TSH 0.94 07/09/2015 03:15 AM         Lab Results   Component Value Date/Time    Cholesterol, total 139 09/11/2019 08:50 AM    Cholesterol, total 195 01/29/2019 08:56 AM    Cholesterol, total 151 09/15/2017 08:56 AM    Cholesterol, total 208 (H) 04/24/2017 08:34 AM    Cholesterol, total 143 02/03/2016 09:40 AM    HDL Cholesterol 36 (L) 09/11/2019 08:50 AM    HDL Cholesterol 36 (L) 01/29/2019 08:56 AM    HDL Cholesterol 39 (L) 09/15/2017 08:56 AM    HDL Cholesterol 49 04/24/2017 08:34 AM    HDL Cholesterol 50 02/03/2016 09:40 AM    LDL,Direct 80 07/10/2015 04:44 AM    LDL, calculated 50 09/11/2019 08:50 AM    LDL, calculated Comment 01/29/2019 08:56 AM    LDL, calculated 58 09/15/2017 08:56 AM    LDL, calculated 95 04/24/2017 08:34 AM    LDL, calculated 66 02/03/2016 09:40 AM    Triglyceride 266 (H) 09/11/2019 08:50 AM Triglyceride 442 (H) 01/29/2019 08:56 AM    Triglyceride 271 (H) 09/15/2017 08:56 AM    Triglyceride 321 (H) 04/24/2017 08:34 AM    Triglyceride 137 02/03/2016 09:40 AM    CHOL/HDL Ratio 5.6 (H) 07/10/2015 04:44 AM        Marylee Chestnut, ACNP, [id number]  [facility] [address] , [address] [facility] [address] [place] [address]  () [phone] ([facility]) [phone]

## 2022-02-03 ENCOUNTER — HOSPITAL ENCOUNTER (OUTPATIENT)
Dept: CT IMAGING | Age: 57
Discharge: HOME | End: 2022-02-03
Attending: ORTHOPAEDIC SURGERY
Payer: COMMERCIAL

## 2022-02-03 DIAGNOSIS — M17.11 PRIMARY LOCALIZED OSTEOARTHRITIS OF RIGHT KNEE: ICD-10-CM

## 2022-02-03 PROCEDURE — 73700 CT LOWER EXTREMITY W/O DYE: CPT

## 2022-02-07 ENCOUNTER — HOSPITAL ENCOUNTER (OUTPATIENT)
Dept: PREADMISSION TESTING | Age: 57
Discharge: HOME | End: 2022-02-07
Payer: COMMERCIAL

## 2022-02-07 VITALS
RESPIRATION RATE: 18 BRPM | TEMPERATURE: 97.3 F | HEART RATE: 86 BPM | WEIGHT: 267.9 LBS | BODY MASS INDEX: 38.35 KG/M2 | DIASTOLIC BLOOD PRESSURE: 78 MMHG | OXYGEN SATURATION: 98 % | HEIGHT: 70 IN | SYSTOLIC BLOOD PRESSURE: 130 MMHG

## 2022-02-07 LAB
ALBUMIN SERPL-MCNC: 4.2 G/DL (ref 3.5–5)
ALBUMIN/GLOB SERPL: 1.1 {RATIO} (ref 1.1–2.2)
ALP SERPL-CCNC: 65 U/L (ref 45–117)
ALT SERPL-CCNC: 36 U/L (ref 12–78)
ANION GAP SERPL CALC-SCNC: 8 MMOL/L (ref 5–15)
APPEARANCE UR: CLEAR
AST SERPL-CCNC: 27 U/L (ref 15–37)
ATRIAL RATE: 81 BPM
BACTERIA URNS QL MICRO: NEGATIVE /HPF
BASOPHILS # BLD: 0.1 K/UL (ref 0–0.1)
BASOPHILS NFR BLD: 1 % (ref 0–1)
BILIRUB SERPL-MCNC: 0.6 MG/DL (ref 0.2–1)
BILIRUB UR QL: NEGATIVE
BUN SERPL-MCNC: 27 MG/DL (ref 6–20)
BUN/CREAT SERPL: 19 (ref 12–20)
CALCIUM SERPL-MCNC: 9.9 MG/DL (ref 8.5–10.1)
CALCULATED P AXIS, ECG09: 58 DEGREES
CALCULATED R AXIS, ECG10: 14 DEGREES
CALCULATED T AXIS, ECG11: 42 DEGREES
CHLORIDE SERPL-SCNC: 100 MMOL/L (ref 97–108)
CO2 SERPL-SCNC: 31 MMOL/L (ref 21–32)
COLOR UR: ABNORMAL
CREAT SERPL-MCNC: 1.4 MG/DL (ref 0.7–1.3)
CRP SERPL-MCNC: <0.29 MG/DL (ref 0–0.6)
DIAGNOSIS, 93000: NORMAL
DIFFERENTIAL METHOD BLD: NORMAL
EOSINOPHIL # BLD: 0.3 K/UL (ref 0–0.4)
EOSINOPHIL NFR BLD: 4 % (ref 0–7)
EPITH CASTS URNS QL MICRO: ABNORMAL /LPF
ERYTHROCYTE [DISTWIDTH] IN BLOOD BY AUTOMATED COUNT: 13.6 % (ref 11.5–14.5)
ERYTHROCYTE [SEDIMENTATION RATE] IN BLOOD: 3 MM/HR (ref 0–20)
EST. AVERAGE GLUCOSE BLD GHB EST-MCNC: 166 MG/DL
GLOBULIN SER CALC-MCNC: 4 G/DL (ref 2–4)
GLUCOSE SERPL-MCNC: 186 MG/DL (ref 65–100)
GLUCOSE UR STRIP.AUTO-MCNC: >1000 MG/DL
HBA1C MFR BLD: 7.4 % (ref 4–5.6)
HCT VFR BLD AUTO: 49.9 % (ref 36.6–50.3)
HGB BLD-MCNC: 16.4 G/DL (ref 12.1–17)
HGB UR QL STRIP: NEGATIVE
HYALINE CASTS URNS QL MICRO: ABNORMAL /LPF (ref 0–5)
IMM GRANULOCYTES # BLD AUTO: 0 K/UL (ref 0–0.04)
IMM GRANULOCYTES NFR BLD AUTO: 0 % (ref 0–0.5)
KETONES UR QL STRIP.AUTO: NEGATIVE MG/DL
LEUKOCYTE ESTERASE UR QL STRIP.AUTO: NEGATIVE
LYMPHOCYTES # BLD: 1.5 K/UL (ref 0.8–3.5)
LYMPHOCYTES NFR BLD: 22 % (ref 12–49)
MCH RBC QN AUTO: 29 PG (ref 26–34)
MCHC RBC AUTO-ENTMCNC: 32.9 G/DL (ref 30–36.5)
MCV RBC AUTO: 88.2 FL (ref 80–99)
MONOCYTES # BLD: 0.7 K/UL (ref 0–1)
MONOCYTES NFR BLD: 9 % (ref 5–13)
NEUTS SEG # BLD: 4.4 K/UL (ref 1.8–8)
NEUTS SEG NFR BLD: 64 % (ref 32–75)
NITRITE UR QL STRIP.AUTO: NEGATIVE
NRBC # BLD: 0 K/UL (ref 0–0.01)
NRBC BLD-RTO: 0 PER 100 WBC
P-R INTERVAL, ECG05: 184 MS
PH UR STRIP: 5.5 [PH] (ref 5–8)
PLATELET # BLD AUTO: 212 K/UL (ref 150–400)
PMV BLD AUTO: 9.8 FL (ref 8.9–12.9)
POTASSIUM SERPL-SCNC: 4.1 MMOL/L (ref 3.5–5.1)
PROT SERPL-MCNC: 8.2 G/DL (ref 6.4–8.2)
PROT UR STRIP-MCNC: NEGATIVE MG/DL
Q-T INTERVAL, ECG07: 374 MS
QRS DURATION, ECG06: 94 MS
QTC CALCULATION (BEZET), ECG08: 434 MS
RBC # BLD AUTO: 5.66 M/UL (ref 4.1–5.7)
RBC #/AREA URNS HPF: ABNORMAL /HPF (ref 0–5)
SODIUM SERPL-SCNC: 139 MMOL/L (ref 136–145)
SP GR UR REFRACTOMETRY: 1.03 (ref 1–1.03)
UA: UC IF INDICATED,UAUC: ABNORMAL
UROBILINOGEN UR QL STRIP.AUTO: 1 EU/DL (ref 0.2–1)
VENTRICULAR RATE, ECG03: 81 BPM
WBC # BLD AUTO: 7 K/UL (ref 4.1–11.1)
WBC URNS QL MICRO: ABNORMAL /HPF (ref 0–4)

## 2022-02-07 PROCEDURE — 86140 C-REACTIVE PROTEIN: CPT

## 2022-02-07 PROCEDURE — 93005 ELECTROCARDIOGRAM TRACING: CPT

## 2022-02-07 PROCEDURE — 81001 URINALYSIS AUTO W/SCOPE: CPT

## 2022-02-07 PROCEDURE — 80053 COMPREHEN METABOLIC PANEL: CPT

## 2022-02-07 PROCEDURE — 83036 HEMOGLOBIN GLYCOSYLATED A1C: CPT

## 2022-02-07 PROCEDURE — 36415 COLL VENOUS BLD VENIPUNCTURE: CPT

## 2022-02-07 PROCEDURE — 85652 RBC SED RATE AUTOMATED: CPT

## 2022-02-07 PROCEDURE — 85025 COMPLETE CBC W/AUTO DIFF WBC: CPT

## 2022-02-07 PROCEDURE — 84466 ASSAY OF TRANSFERRIN: CPT

## 2022-02-07 NOTE — PERIOP NOTES
[address] , [address] [facility]   MAIN OR                                  [phone]   MAIN PRE OP                          [phone]                                                                                AMBULATORY PRE OP          [phone]  PRE-ADMISSION TESTING    [phone]   Surgery Date:  Thursday 2.17.22        Is surgery arrival time given by surgeon? NO  If NO, [address] staff will call you between 3 and 7pm the day before your surgery with your arrival time. (If your surgery is on a Monday, we will call you the Friday before.)    Call [phone] after 7pm Monday-Friday if you did not receive this call. INSTRUCTIONS BEFORE YOUR SURGERY   When You  Arrive Arrive at the [address] [facility] on the day of your surgery  Have your insurance card, photo ID, and any copayment (if needed)   Food   and   Drink NO food or drink after midnight the night before surgery    This means NO water, gum, mints, coffee, juice, etc.  No alcohol (beer, wine, liquor) 24 hours before and after surgery   Medications to   TAKE   Morning of Surgery MEDICATIONS TO TAKE THE MORNING OF SURGERY WITH A SIP OF WATER:    Cardizem   Cymbalta   Monopril   Bring CPAP for day of surgery      Medications  To  STOP      7 days before surgery  Non-Steroidal anti-inflammatory Drugs (NSAID's): for example, Ibuprofen (Advil, Motrin), Naproxen (Aleve)   Aspirin, if taking for pain    Herbal supplements, vitamins, and fish oil   Other:  (Pain medications not listed above, including Tylenol may be taken)   Blood  Thinners  If you take  Aspirin, Plavix, Coumadin, or any blood-thinning or anti-blood clot medicine, talk to the doctor who prescribed the medications for pre-operative instructions.    Plavix stop 2.11.22   Bathing Clothing  Jewelry  Valuables      If you shower the morning of surgery, please do not apply anything to your skin (lotions, powders, deodorant, or makeup, especially mascara)   Follow Chlorhexidine [employer] body wash instructions provided to you during PAT appointment. Begin 3 days prior to surgery.  Do not shave or trim anywhere 24 hours before surgery   Wear your hair loose or down; no pony-tails, buns, or metal hair clips   Wear loose, comfortable, clean clothes   Wear glasses instead of contacts  [employer] money, valuables, and jewelry, including body piercings, at home   If you were given an [employer], bring it on day of surgery. Going Home - or Spending the Night  SAME-DAY SURGERY: You must have a responsible adult drive you home and stay with you 24 hours after surgery   ADMITS: If your doctor is keeping you in the hospital after surgery, leave personal belongings/luggage in your car until you have a hospital room number. Hospital discharge time is 12 noon  Drivers must be here before 12 noon unless you are told differently   Special Instructions It is now mandated that all surgical patients be tested for COVID-19 prior to surgery. Testing has to be exactly 4 days prior to surgery. Your COVID test date is Monday 2.14.22  between 8:00 am and 11:00 am.       COVID testing will be performed curbside at the [facility] [address]. There will be signs leading you to the testing site. You will need to bring a photo ID with you to be swabbed. Patients are advised to self-quarantine at home after testing and prior to your surgery date. You will be notified if your results are positive.     What to watch for:   Coronavirus (COVID-19) affects different people in different ways   It also appears with a wide range of symptoms from mild to severe   Signs usually appear 2-14 days after exposure     If you develop any of the following, notify your doctor immediately:  o Fever  o Chills, with or without a shiver  o Muscle pain  o Headache  o Sore throat  o Dry cough  o New loss of taste or smell  o Tiredness      If you develop any of the following, call 900:  o Shortness of breath  o Difficulty breathing  o Chest pain  o New confusion  o Blueness of fingers and/or lips       Follow all instructions so your surgery wont be cancelled. Please, be on time. If a situation occurs and you are delayed the day of surgery, call  [phone]. If your physical condition changes (like a fever, cold, flu, etc.) call your surgeon. Home medication(s) reviewed and verified via  LIST   VERBAL   during PAT appointment. The patient was contacted by      IN-PERSON    The patient verbalizes understanding of all instructions and     DOES NOT   need reinforcement.

## 2022-02-08 LAB
BACTERIA SPEC CULT: NORMAL
BACTERIA SPEC CULT: NORMAL
BUN SERPL-MCNC: 25 MG/DL (ref 6–24)
BUN/CREAT SERPL: 19 (ref 9–20)
CALCIUM SERPL-MCNC: 10.4 MG/DL (ref 8.7–10.2)
CHLORIDE SERPL-SCNC: 99 MMOL/L (ref 96–106)
CHOLEST SERPL-MCNC: 170 MG/DL (ref 100–199)
CO2 SERPL-SCNC: 25 MMOL/L (ref 20–29)
CREAT SERPL-MCNC: 1.31 MG/DL (ref 0.76–1.27)
GLUCOSE SERPL-MCNC: 192 MG/DL (ref 65–99)
HDLC SERPL-MCNC: 40 MG/DL
IMP & REVIEW OF LAB RESULTS: NORMAL
LDLC SERPL CALC-MCNC: 74 MG/DL (ref 0–99)
MAGNESIUM SERPL-MCNC: 2.2 MG/DL (ref 1.6–2.3)
POTASSIUM SERPL-SCNC: 4.1 MMOL/L (ref 3.5–5.2)
SERVICE CMNT-IMP: NORMAL
SODIUM SERPL-SCNC: 141 MMOL/L (ref 134–144)
TRANSFERRIN SERPL-MCNC: 281 MG/DL (ref 177–329)
TRIGL SERPL-MCNC: 349 MG/DL (ref 0–149)
VLDLC SERPL CALC-MCNC: 56 MG/DL (ref 5–40)
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
## 2022-02-09 RX ORDER — CHLORHEXIDINE GLUCONATE 4 G/100ML
59 SOLUTION TOPICAL DAILY
Qty: 118 ML | Refills: 0 | Status: SHIPPED | OUTPATIENT
Start: 2022-02-09 | End: 2022-02-11

## 2022-02-09 RX ORDER — MUPIROCIN 20 MG/G
OINTMENT TOPICAL 2 TIMES DAILY
Qty: 22 G | Refills: 0 | Status: SHIPPED | OUTPATIENT
Start: 2022-02-09 | End: 2022-02-14

## 2022-02-09 NOTE — PROGRESS NOTES
Notification of Positive MSSA and Treatment Ordered by Preadmission Testing Nurse Practitioner      Patient Name:  Erin Lreoy  MRN: [id number]  : 1965    Surgeon: Fabrizio Monae  Date of surgery: 22  Procedure: RTK    Allergies: Allergies   Allergen Reactions    Cleocin [Clindamycin Hcl] Rash       MRSA results: All Micro Results     Procedure Component Value Units Date/Time    MRSA CULTURE NARES [[id number]] Collected: 22 0930    Order Status: Completed Specimen: Nasal from Nares Updated: 22 1600     Special Requests: NO SPECIAL REQUESTS        Culture result:       MRSA NOT PRESENT. Apparent Staphylococus aureus (not MRSA noted). Screening of patient nares for MRSA is for surveillance purposes and, if positive, to facilitate isolation considerations in high risk settings. It is not intended for automatic decolonization interventions per se as regimens are not sufficiently effective to warrant routine use.                 Treatment ordered: Bactroban ointment 2%- apply intranasal twice daily for 5 days & Chlorhexidine wash x 2 days with instructions    Date patient notified of results / treatment prescribed: 22    The patient was instructed to add medication and date they began treatment to their \"Prior to Admission Medication\" list given to them in [address], NP

## 2022-02-09 NOTE — PERIOP NOTES
Patient's nasal swab positive for MSSA. Results and medication treatment reviewed with patient. Patient verbalized understanding.

## 2022-02-14 ENCOUNTER — HOSPITAL ENCOUNTER (OUTPATIENT)
Dept: PREADMISSION TESTING | Age: 57
Discharge: HOME | End: 2022-02-14
Payer: COMMERCIAL

## 2022-02-14 PROCEDURE — U0005 INFEC AGEN DETEC AMPLI PROBE: HCPCS

## 2022-02-15 LAB
SARS-COV-2, XPLCVT: NOT DETECTED
SOURCE, COVRS: NORMAL

## 2022-02-16 ENCOUNTER — ANESTHESIA EVENT (OUTPATIENT)
Dept: SURGERY | Age: 57
End: 2022-02-16
Payer: COMMERCIAL

## 2022-02-16 RX ORDER — PANTOPRAZOLE SODIUM 40 MG/1
40 TABLET, DELAYED RELEASE ORAL
COMMUNITY

## 2022-02-16 RX ORDER — NEBIVOLOL 10 MG/1
TABLET ORAL
COMMUNITY
End: 2022-04-28

## 2022-02-16 RX ORDER — SUB-Q INSULIN DEVICE, 40 UNIT
EACH MISCELLANEOUS
COMMUNITY

## 2022-02-16 NOTE — PERIOP NOTES
In reviewing pt's chart for DOS tomorrow, errors noted in med admin directions. Called pt @ this time. After verifying meds & administration times, instructed pt to take cymbalta & cardizem only in the morning. Pt verbalized understanding of these directions & the rationale of not taking ACE inhibitor in the morning. Answered several questions for pt & his wife, Dre Carrillo, about processes tomorrow. No further instruction needed. Informed them that preop will call after 1400 for arrival time. Med Rec Report corrected per pt's report.

## 2022-02-17 ENCOUNTER — HOSPITAL ENCOUNTER (OUTPATIENT)
Age: 57
Setting detail: OBSERVATION
Discharge: HOME | End: 2022-02-18
Attending: ORTHOPAEDIC SURGERY | Admitting: ORTHOPAEDIC SURGERY
Payer: COMMERCIAL

## 2022-02-17 ENCOUNTER — ANESTHESIA (OUTPATIENT)
Dept: SURGERY | Age: 57
End: 2022-02-17
Payer: COMMERCIAL

## 2022-02-17 ENCOUNTER — APPOINTMENT (OUTPATIENT)
Dept: GENERAL RADIOLOGY | Age: 57
End: 2022-02-17
Attending: PHYSICIAN ASSISTANT
Payer: COMMERCIAL

## 2022-02-17 DIAGNOSIS — M17.11 ARTHRITIS OF RIGHT KNEE: Primary | ICD-10-CM

## 2022-02-17 LAB
GLUCOSE BLD STRIP.AUTO-MCNC: 149 MG/DL (ref 65–117)
GLUCOSE BLD STRIP.AUTO-MCNC: 201 MG/DL (ref 65–117)
GLUCOSE BLD STRIP.AUTO-MCNC: 271 MG/DL (ref 65–117)
SERVICE CMNT-IMP: ABNORMAL

## 2022-02-17 PROCEDURE — 77030035236 HC SUT PDS STRATFX BARB J&J -B: Performed by: ORTHOPAEDIC SURGERY

## 2022-02-17 PROCEDURE — 74011000250 HC RX REV CODE- 250: Performed by: ORTHOPAEDIC SURGERY

## 2022-02-17 PROCEDURE — 74011250636 HC RX REV CODE- 250/636: Performed by: ANESTHESIOLOGY

## 2022-02-17 PROCEDURE — 76030000021 HC AMB SURG 2 TO 2.5 HR INTENSV-TIER 1: Performed by: ORTHOPAEDIC SURGERY

## 2022-02-17 PROCEDURE — 74011000250 HC RX REV CODE- 250: Performed by: PHYSICIAN ASSISTANT

## 2022-02-17 PROCEDURE — 97116 GAIT TRAINING THERAPY: CPT

## 2022-02-17 PROCEDURE — 82962 GLUCOSE BLOOD TEST: CPT

## 2022-02-17 PROCEDURE — 74011000250 HC RX REV CODE- 250: Performed by: ANESTHESIOLOGY

## 2022-02-17 PROCEDURE — C1776 JOINT DEVICE (IMPLANTABLE): HCPCS | Performed by: ORTHOPAEDIC SURGERY

## 2022-02-17 PROCEDURE — 77030002933 HC SUT MCRYL J&J -A: Performed by: ORTHOPAEDIC SURGERY

## 2022-02-17 PROCEDURE — 77030040361 HC SLV COMPR DVT MDII -B

## 2022-02-17 PROCEDURE — 77030029828 HC FEM TIB CKPNT KT DISP STRY -B: Performed by: ORTHOPAEDIC SURGERY

## 2022-02-17 PROCEDURE — G0378 HOSPITAL OBSERVATION PER HR: HCPCS

## 2022-02-17 PROCEDURE — 74011636637 HC RX REV CODE- 636/637: Performed by: ORTHOPAEDIC SURGERY

## 2022-02-17 PROCEDURE — 76060000064 HC AMB SURG ANES 2 TO 2.5 HR: Performed by: ORTHOPAEDIC SURGERY

## 2022-02-17 PROCEDURE — 77030006835 HC BLD SAW SAG STRY -B: Performed by: ORTHOPAEDIC SURGERY

## 2022-02-17 PROCEDURE — 74011250637 HC RX REV CODE- 250/637: Performed by: PHYSICIAN ASSISTANT

## 2022-02-17 PROCEDURE — 77030003601 HC NDL NRV BLK BBMI -A

## 2022-02-17 PROCEDURE — 97161 PT EVAL LOW COMPLEX 20 MIN: CPT

## 2022-02-17 PROCEDURE — 74011250636 HC RX REV CODE- 250/636: Performed by: PHYSICIAN ASSISTANT

## 2022-02-17 PROCEDURE — 77030031139 HC SUT VCRL2 J&J -A: Performed by: ORTHOPAEDIC SURGERY

## 2022-02-17 PROCEDURE — 74011250637 HC RX REV CODE- 250/637: Performed by: ANESTHESIOLOGY

## 2022-02-17 PROCEDURE — 77030040393 HC DRSG OPTIFOAM GENT MDII -B: Performed by: ORTHOPAEDIC SURGERY

## 2022-02-17 PROCEDURE — 77030040922 HC BLNKT HYPOTHRM STRY -A

## 2022-02-17 PROCEDURE — 76210000045 HC AMBSU PH I REC 6.5 TO 7 HR: Performed by: ORTHOPAEDIC SURGERY

## 2022-02-17 PROCEDURE — 2709999900 HC NON-CHARGEABLE SUPPLY: Performed by: ORTHOPAEDIC SURGERY

## 2022-02-17 PROCEDURE — 74011636637 HC RX REV CODE- 636/637: Performed by: PHYSICIAN ASSISTANT

## 2022-02-17 PROCEDURE — 77030041305 HC PN BN MAKO STRY -B: Performed by: ORTHOPAEDIC SURGERY

## 2022-02-17 PROCEDURE — 74011250636 HC RX REV CODE- 250/636: Performed by: ORTHOPAEDIC SURGERY

## 2022-02-17 PROCEDURE — 77030029820: Performed by: ORTHOPAEDIC SURGERY

## 2022-02-17 PROCEDURE — 73560 X-RAY EXAM OF KNEE 1 OR 2: CPT

## 2022-02-17 PROCEDURE — 77030018723 HC ELCTRD BLD COVD -A: Performed by: ORTHOPAEDIC SURGERY

## 2022-02-17 PROCEDURE — 74011000258 HC RX REV CODE- 258: Performed by: PHYSICIAN ASSISTANT

## 2022-02-17 PROCEDURE — 77030038149 HC BLD SAW SAG STRY -D: Performed by: ORTHOPAEDIC SURGERY

## 2022-02-17 DEVICE — PATELLA
Type: IMPLANTABLE DEVICE | Site: KNEE | Status: FUNCTIONAL
Brand: TRIATHLON

## 2022-02-17 DEVICE — TIBIAL COMPONENT
Type: IMPLANTABLE DEVICE | Site: KNEE | Status: FUNCTIONAL
Brand: TRIATHLON

## 2022-02-17 DEVICE — TIBIAL BEARING INSERT - CR
Type: IMPLANTABLE DEVICE | Site: KNEE | Status: FUNCTIONAL
Brand: TRIATHLON

## 2022-02-17 DEVICE — KNEE K2 TOT HEMI ADV CMTLS -- IMPL CAPPED K2: Type: IMPLANTABLE DEVICE | Status: FUNCTIONAL

## 2022-02-17 DEVICE — CRUCIATE RETAINING FEMORAL
Type: IMPLANTABLE DEVICE | Site: KNEE | Status: FUNCTIONAL
Brand: TRIATHLON

## 2022-02-17 RX ORDER — DIPHENHYDRAMINE HYDROCHLORIDE 50 MG/ML
12.5 INJECTION, SOLUTION INTRAMUSCULAR; INTRAVENOUS AS NEEDED
Status: DISCONTINUED | OUTPATIENT
Start: 2022-02-17 | End: 2022-02-17 | Stop reason: HOSPADM

## 2022-02-17 RX ORDER — NALOXONE HYDROCHLORIDE 0.4 MG/ML
0.2 INJECTION, SOLUTION INTRAMUSCULAR; INTRAVENOUS; SUBCUTANEOUS
Status: DISCONTINUED | OUTPATIENT
Start: 2022-02-17 | End: 2022-02-17 | Stop reason: HOSPADM

## 2022-02-17 RX ORDER — ASPIRIN 81 MG/1
81 TABLET ORAL 2 TIMES DAILY
Status: DISCONTINUED | OUTPATIENT
Start: 2022-02-17 | End: 2022-02-18 | Stop reason: HOSPADM

## 2022-02-17 RX ORDER — OXYCODONE HYDROCHLORIDE 5 MG/1
5 TABLET ORAL
Qty: 42 TABLET | Refills: 0 | Status: SHIPPED | OUTPATIENT
Start: 2022-02-17 | End: 2022-02-24

## 2022-02-17 RX ORDER — PREGABALIN 75 MG/1
75 CAPSULE ORAL
Status: DISCONTINUED | OUTPATIENT
Start: 2022-02-17 | End: 2022-02-18 | Stop reason: HOSPADM

## 2022-02-17 RX ORDER — FACIAL-BODY WIPES
10 EACH TOPICAL DAILY PRN
Status: DISCONTINUED | OUTPATIENT
Start: 2022-02-19 | End: 2022-02-18 | Stop reason: HOSPADM

## 2022-02-17 RX ORDER — LISINOPRIL 20 MG/1
40 TABLET ORAL DAILY
Status: DISCONTINUED | OUTPATIENT
Start: 2022-02-18 | End: 2022-02-18 | Stop reason: HOSPADM

## 2022-02-17 RX ORDER — MAGNESIUM SULFATE 100 %
4 CRYSTALS MISCELLANEOUS AS NEEDED
Status: DISCONTINUED | OUTPATIENT
Start: 2022-02-17 | End: 2022-02-17 | Stop reason: SDUPTHER

## 2022-02-17 RX ORDER — IBUPROFEN 800 MG/1
800 TABLET ORAL
Qty: 50 TABLET | Refills: 2 | Status: SHIPPED | OUTPATIENT
Start: 2022-02-17 | End: 2022-04-28

## 2022-02-17 RX ORDER — ONDANSETRON 8 MG/1
4 TABLET, ORALLY DISINTEGRATING ORAL
Qty: 30 TABLET | Refills: 0 | Status: SHIPPED | OUTPATIENT
Start: 2022-02-17 | End: 2022-04-28

## 2022-02-17 RX ORDER — SODIUM CHLORIDE 0.9 % (FLUSH) 0.9 %
5-40 SYRINGE (ML) INJECTION EVERY 8 HOURS
Status: DISCONTINUED | OUTPATIENT
Start: 2022-02-17 | End: 2022-02-18 | Stop reason: HOSPADM

## 2022-02-17 RX ORDER — DIPHENHYDRAMINE HYDROCHLORIDE 50 MG/ML
12.5 INJECTION, SOLUTION INTRAMUSCULAR; INTRAVENOUS
Status: DISCONTINUED | OUTPATIENT
Start: 2022-02-17 | End: 2022-02-18 | Stop reason: HOSPADM

## 2022-02-17 RX ORDER — SODIUM CHLORIDE, SODIUM LACTATE, POTASSIUM CHLORIDE, CALCIUM CHLORIDE 600; 310; 30; 20 MG/100ML; MG/100ML; MG/100ML; MG/100ML
125 INJECTION, SOLUTION INTRAVENOUS CONTINUOUS
Status: DISCONTINUED | OUTPATIENT
Start: 2022-02-17 | End: 2022-02-17 | Stop reason: HOSPADM

## 2022-02-17 RX ORDER — MISOPROSTOL 200 UG/1
200 TABLET ORAL
Status: DISCONTINUED | OUTPATIENT
Start: 2022-02-17 | End: 2022-02-18 | Stop reason: HOSPADM

## 2022-02-17 RX ORDER — LIDOCAINE HYDROCHLORIDE 10 MG/ML
0.1 INJECTION, SOLUTION EPIDURAL; INFILTRATION; INTRACAUDAL; PERINEURAL AS NEEDED
Status: DISCONTINUED | OUTPATIENT
Start: 2022-02-17 | End: 2022-02-17 | Stop reason: HOSPADM

## 2022-02-17 RX ORDER — ACETAMINOPHEN 325 MG/1
650 TABLET ORAL EVERY 6 HOURS
Status: DISCONTINUED | OUTPATIENT
Start: 2022-02-17 | End: 2022-02-18 | Stop reason: HOSPADM

## 2022-02-17 RX ORDER — NITROGLYCERIN 0.4 MG/1
0.4 TABLET SUBLINGUAL
Status: DISCONTINUED | OUTPATIENT
Start: 2022-02-17 | End: 2022-02-18 | Stop reason: HOSPADM

## 2022-02-17 RX ORDER — OXYCODONE HCL 10 MG/1
20 TABLET, FILM COATED, EXTENDED RELEASE ORAL ONCE
Status: COMPLETED | OUTPATIENT
Start: 2022-02-17 | End: 2022-02-17

## 2022-02-17 RX ORDER — OXYCODONE HYDROCHLORIDE 5 MG/1
10 TABLET ORAL
Status: DISCONTINUED | OUTPATIENT
Start: 2022-02-17 | End: 2022-02-18 | Stop reason: HOSPADM

## 2022-02-17 RX ORDER — HYDROCHLOROTHIAZIDE 25 MG/1
25 TABLET ORAL DAILY
Status: DISCONTINUED | OUTPATIENT
Start: 2022-02-18 | End: 2022-02-18 | Stop reason: HOSPADM

## 2022-02-17 RX ORDER — OXYCODONE HYDROCHLORIDE 5 MG/1
10 TABLET ORAL
Status: DISCONTINUED | OUTPATIENT
Start: 2022-02-17 | End: 2022-02-17 | Stop reason: HOSPADM

## 2022-02-17 RX ORDER — POLYETHYLENE GLYCOL 3350 17 G/17G
17 POWDER, FOR SOLUTION ORAL DAILY
Status: DISCONTINUED | OUTPATIENT
Start: 2022-02-18 | End: 2022-02-18 | Stop reason: HOSPADM

## 2022-02-17 RX ORDER — MIDAZOLAM HYDROCHLORIDE 1 MG/ML
INJECTION, SOLUTION INTRAMUSCULAR; INTRAVENOUS AS NEEDED
Status: DISCONTINUED | OUTPATIENT
Start: 2022-02-17 | End: 2022-02-17 | Stop reason: HOSPADM

## 2022-02-17 RX ORDER — ACETAMINOPHEN 500 MG
975 TABLET ORAL
Qty: 60 TABLET | Refills: 1 | Status: SHIPPED | OUTPATIENT
Start: 2022-02-17

## 2022-02-17 RX ORDER — NALOXONE HYDROCHLORIDE 0.4 MG/ML
0.4 INJECTION, SOLUTION INTRAMUSCULAR; INTRAVENOUS; SUBCUTANEOUS AS NEEDED
Status: DISCONTINUED | OUTPATIENT
Start: 2022-02-17 | End: 2022-02-18 | Stop reason: HOSPADM

## 2022-02-17 RX ORDER — SODIUM CHLORIDE 9 MG/ML
125 INJECTION, SOLUTION INTRAVENOUS CONTINUOUS
Status: DISCONTINUED | OUTPATIENT
Start: 2022-02-17 | End: 2022-02-18 | Stop reason: HOSPADM

## 2022-02-17 RX ORDER — AMOXICILLIN 250 MG
1 CAPSULE ORAL 2 TIMES DAILY
Status: DISCONTINUED | OUTPATIENT
Start: 2022-02-17 | End: 2022-02-18 | Stop reason: HOSPADM

## 2022-02-17 RX ORDER — HYDROMORPHONE HYDROCHLORIDE 1 MG/ML
0.5 INJECTION, SOLUTION INTRAMUSCULAR; INTRAVENOUS; SUBCUTANEOUS
Status: DISCONTINUED | OUTPATIENT
Start: 2022-02-17 | End: 2022-02-18 | Stop reason: HOSPADM

## 2022-02-17 RX ORDER — PHENYLEPHRINE HCL IN 0.9% NACL 0.4MG/10ML
SYRINGE (ML) INTRAVENOUS AS NEEDED
Status: DISCONTINUED | OUTPATIENT
Start: 2022-02-17 | End: 2022-02-17 | Stop reason: HOSPADM

## 2022-02-17 RX ORDER — PANTOPRAZOLE SODIUM 40 MG/1
40 TABLET, DELAYED RELEASE ORAL
Status: DISCONTINUED | OUTPATIENT
Start: 2022-02-17 | End: 2022-02-18 | Stop reason: HOSPADM

## 2022-02-17 RX ORDER — METOPROLOL SUCCINATE 50 MG/1
100 TABLET, EXTENDED RELEASE ORAL
Status: DISCONTINUED | OUTPATIENT
Start: 2022-02-17 | End: 2022-02-18 | Stop reason: HOSPADM

## 2022-02-17 RX ORDER — METFORMIN HYDROCHLORIDE 500 MG/1
1000 TABLET ORAL
Status: DISCONTINUED | OUTPATIENT
Start: 2022-02-18 | End: 2022-02-18 | Stop reason: HOSPADM

## 2022-02-17 RX ORDER — SODIUM CHLORIDE 0.9 % (FLUSH) 0.9 %
5-40 SYRINGE (ML) INJECTION AS NEEDED
Status: DISCONTINUED | OUTPATIENT
Start: 2022-02-17 | End: 2022-02-18 | Stop reason: HOSPADM

## 2022-02-17 RX ORDER — PREGABALIN 75 MG/1
75 CAPSULE ORAL
Qty: 30 CAPSULE | Refills: 0 | Status: SHIPPED | OUTPATIENT
Start: 2022-02-17

## 2022-02-17 RX ORDER — INSULIN LISPRO 100 [IU]/ML
5 INJECTION, SOLUTION INTRAVENOUS; SUBCUTANEOUS ONCE
Status: COMPLETED | OUTPATIENT
Start: 2022-02-17 | End: 2022-02-17

## 2022-02-17 RX ORDER — POVIDONE-IODINE 10 %
SOLUTION, NON-ORAL TOPICAL AS NEEDED
Status: DISCONTINUED | OUTPATIENT
Start: 2022-02-17 | End: 2022-02-17 | Stop reason: HOSPADM

## 2022-02-17 RX ORDER — INSULIN LISPRO 100 [IU]/ML
INJECTION, SOLUTION INTRAVENOUS; SUBCUTANEOUS
Status: DISCONTINUED | OUTPATIENT
Start: 2022-02-17 | End: 2022-02-18 | Stop reason: HOSPADM

## 2022-02-17 RX ORDER — PREGABALIN 75 MG/1
75 CAPSULE ORAL ONCE
Status: COMPLETED | OUTPATIENT
Start: 2022-02-17 | End: 2022-02-17

## 2022-02-17 RX ORDER — ROSUVASTATIN CALCIUM 10 MG/1
40 TABLET, COATED ORAL
Status: DISCONTINUED | OUTPATIENT
Start: 2022-02-17 | End: 2022-02-18 | Stop reason: HOSPADM

## 2022-02-17 RX ORDER — DEXTROSE 50 % IN WATER (D50W) INTRAVENOUS SYRINGE
12.5-25 AS NEEDED
Status: DISCONTINUED | OUTPATIENT
Start: 2022-02-17 | End: 2022-02-18 | Stop reason: HOSPADM

## 2022-02-17 RX ORDER — FENTANYL CITRATE 50 UG/ML
25 INJECTION, SOLUTION INTRAMUSCULAR; INTRAVENOUS
Status: DISCONTINUED | OUTPATIENT
Start: 2022-02-17 | End: 2022-02-17 | Stop reason: HOSPADM

## 2022-02-17 RX ORDER — OMEGA-3-ACID ETHYL ESTERS 1 G/1
1 CAPSULE, LIQUID FILLED ORAL 2 TIMES DAILY WITH MEALS
Status: DISCONTINUED | OUTPATIENT
Start: 2022-02-17 | End: 2022-02-18 | Stop reason: HOSPADM

## 2022-02-17 RX ORDER — HYDROMORPHONE HYDROCHLORIDE 1 MG/ML
.25-1 INJECTION, SOLUTION INTRAMUSCULAR; INTRAVENOUS; SUBCUTANEOUS
Status: DISCONTINUED | OUTPATIENT
Start: 2022-02-17 | End: 2022-02-17 | Stop reason: HOSPADM

## 2022-02-17 RX ORDER — FENTANYL CITRATE 50 UG/ML
INJECTION, SOLUTION INTRAMUSCULAR; INTRAVENOUS AS NEEDED
Status: DISCONTINUED | OUTPATIENT
Start: 2022-02-17 | End: 2022-02-17 | Stop reason: HOSPADM

## 2022-02-17 RX ORDER — MAGNESIUM SULFATE 100 %
4 CRYSTALS MISCELLANEOUS AS NEEDED
Status: DISCONTINUED | OUTPATIENT
Start: 2022-02-17 | End: 2022-02-18 | Stop reason: HOSPADM

## 2022-02-17 RX ORDER — CLOPIDOGREL BISULFATE 75 MG/1
75 TABLET ORAL DAILY
Status: DISCONTINUED | OUTPATIENT
Start: 2022-02-18 | End: 2022-02-18 | Stop reason: HOSPADM

## 2022-02-17 RX ORDER — ONDANSETRON 2 MG/ML
4 INJECTION INTRAMUSCULAR; INTRAVENOUS
Status: DISCONTINUED | OUTPATIENT
Start: 2022-02-17 | End: 2022-02-18 | Stop reason: HOSPADM

## 2022-02-17 RX ORDER — OXYCODONE HYDROCHLORIDE 5 MG/1
5 TABLET ORAL
Status: DISCONTINUED | OUTPATIENT
Start: 2022-02-17 | End: 2022-02-18 | Stop reason: HOSPADM

## 2022-02-17 RX ORDER — MELATONIN
2000 DAILY
Status: DISCONTINUED | OUTPATIENT
Start: 2022-02-18 | End: 2022-02-18 | Stop reason: HOSPADM

## 2022-02-17 RX ORDER — TRAMADOL HYDROCHLORIDE 50 MG/1
50 TABLET ORAL
Qty: 28 TABLET | Refills: 0 | Status: SHIPPED | OUTPATIENT
Start: 2022-02-17 | End: 2022-02-24

## 2022-02-17 RX ORDER — ASPIRIN 81 MG/1
81 TABLET ORAL 2 TIMES DAILY
Qty: 60 TABLET | Refills: 0 | Status: SHIPPED | OUTPATIENT
Start: 2022-02-17

## 2022-02-17 RX ORDER — EPHEDRINE SULFATE/0.9% NACL/PF 50 MG/5 ML
SYRINGE (ML) INTRAVENOUS AS NEEDED
Status: DISCONTINUED | OUTPATIENT
Start: 2022-02-17 | End: 2022-02-17 | Stop reason: HOSPADM

## 2022-02-17 RX ORDER — FAMOTIDINE 20 MG/1
20 TABLET, FILM COATED ORAL 2 TIMES DAILY
Status: DISCONTINUED | OUTPATIENT
Start: 2022-02-17 | End: 2022-02-18 | Stop reason: HOSPADM

## 2022-02-17 RX ORDER — ACETAMINOPHEN 325 MG/1
975 TABLET ORAL ONCE
Status: COMPLETED | OUTPATIENT
Start: 2022-02-17 | End: 2022-02-17

## 2022-02-17 RX ORDER — DEXAMETHASONE SODIUM PHOSPHATE 4 MG/ML
INJECTION, SOLUTION INTRA-ARTICULAR; INTRALESIONAL; INTRAMUSCULAR; INTRAVENOUS; SOFT TISSUE
Status: SHIPPED | OUTPATIENT
Start: 2022-02-17 | End: 2022-02-17

## 2022-02-17 RX ORDER — FLUMAZENIL 0.1 MG/ML
0.2 INJECTION INTRAVENOUS
Status: DISCONTINUED | OUTPATIENT
Start: 2022-02-17 | End: 2022-02-17 | Stop reason: HOSPADM

## 2022-02-17 RX ORDER — DILTIAZEM HYDROCHLORIDE 120 MG/1
240 CAPSULE, COATED, EXTENDED RELEASE ORAL DAILY
Status: DISCONTINUED | OUTPATIENT
Start: 2022-02-18 | End: 2022-02-18 | Stop reason: HOSPADM

## 2022-02-17 RX ORDER — PROPOFOL 10 MG/ML
INJECTION, EMULSION INTRAVENOUS AS NEEDED
Status: DISCONTINUED | OUTPATIENT
Start: 2022-02-17 | End: 2022-02-17 | Stop reason: HOSPADM

## 2022-02-17 RX ORDER — DULOXETIN HYDROCHLORIDE 30 MG/1
30 CAPSULE, DELAYED RELEASE ORAL DAILY
Status: DISCONTINUED | OUTPATIENT
Start: 2022-02-18 | End: 2022-02-18 | Stop reason: HOSPADM

## 2022-02-17 RX ORDER — THERA TABS 400 MCG
1 TAB ORAL DAILY
Status: DISCONTINUED | OUTPATIENT
Start: 2022-02-18 | End: 2022-02-18 | Stop reason: HOSPADM

## 2022-02-17 RX ORDER — ROPIVACAINE HYDROCHLORIDE 2 MG/ML
INJECTION, SOLUTION EPIDURAL; INFILTRATION; PERINEURAL AS NEEDED
Status: DISCONTINUED | OUTPATIENT
Start: 2022-02-17 | End: 2022-02-17 | Stop reason: HOSPADM

## 2022-02-17 RX ORDER — DEXTROSE 50 % IN WATER (D50W) INTRAVENOUS SYRINGE
12.5-25 AS NEEDED
Status: DISCONTINUED | OUTPATIENT
Start: 2022-02-17 | End: 2022-02-17 | Stop reason: SDUPTHER

## 2022-02-17 RX ADMIN — PROPOFOL 150 MG: 10 INJECTION, EMULSION INTRAVENOUS at 08:46

## 2022-02-17 RX ADMIN — MISOPROSTOL 200 MCG: 200 TABLET ORAL at 22:24

## 2022-02-17 RX ADMIN — SODIUM CHLORIDE, PRESERVATIVE FREE 10 ML: 5 INJECTION INTRAVENOUS at 22:24

## 2022-02-17 RX ADMIN — FENTANYL CITRATE 50 MCG: 50 INJECTION, SOLUTION INTRAMUSCULAR; INTRAVENOUS at 08:03

## 2022-02-17 RX ADMIN — PROPOFOL 150 MCG/KG/MIN: 10 INJECTION, EMULSION INTRAVENOUS at 08:49

## 2022-02-17 RX ADMIN — DOCUSATE SODIUM 50MG AND SENNOSIDES 8.6MG 1 TABLET: 8.6; 5 TABLET, FILM COATED ORAL at 19:04

## 2022-02-17 RX ADMIN — Medication 80 MCG: at 09:09

## 2022-02-17 RX ADMIN — ACETAMINOPHEN 975 MG: 325 TABLET ORAL at 07:14

## 2022-02-17 RX ADMIN — FENTANYL CITRATE 50 MCG: 50 INJECTION, SOLUTION INTRAMUSCULAR; INTRAVENOUS at 08:01

## 2022-02-17 RX ADMIN — METOPROLOL SUCCINATE 100 MG: 50 TABLET, EXTENDED RELEASE ORAL at 22:23

## 2022-02-17 RX ADMIN — ASPIRIN 81 MG: 81 TABLET, COATED ORAL at 19:04

## 2022-02-17 RX ADMIN — FENTANYL CITRATE 50 MCG: 50 INJECTION, SOLUTION INTRAMUSCULAR; INTRAVENOUS at 09:08

## 2022-02-17 RX ADMIN — CEFAZOLIN SODIUM 3 G: 1 POWDER, FOR SOLUTION INTRAMUSCULAR; INTRAVENOUS at 09:05

## 2022-02-17 RX ADMIN — CEFAZOLIN 1 G: 1 INJECTION, POWDER, FOR SOLUTION INTRAMUSCULAR; INTRAVENOUS at 16:33

## 2022-02-17 RX ADMIN — SODIUM CHLORIDE, POTASSIUM CHLORIDE, SODIUM LACTATE AND CALCIUM CHLORIDE 125 ML/HR: 600; 310; 30; 20 INJECTION, SOLUTION INTRAVENOUS at 07:57

## 2022-02-17 RX ADMIN — INSULIN LISPRO 5 UNITS: 100 INJECTION, SOLUTION INTRAVENOUS; SUBCUTANEOUS at 19:38

## 2022-02-17 RX ADMIN — SODIUM CHLORIDE, POTASSIUM CHLORIDE, SODIUM LACTATE AND CALCIUM CHLORIDE: 600; 310; 30; 20 INJECTION, SOLUTION INTRAVENOUS at 09:37

## 2022-02-17 RX ADMIN — Medication 80 MCG: at 09:03

## 2022-02-17 RX ADMIN — Medication 80 MCG: at 10:31

## 2022-02-17 RX ADMIN — MIDAZOLAM 2 MG: 1 INJECTION, SOLUTION INTRAMUSCULAR; INTRAVENOUS at 08:01

## 2022-02-17 RX ADMIN — SODIUM CHLORIDE, POTASSIUM CHLORIDE, SODIUM LACTATE AND CALCIUM CHLORIDE 125 ML/HR: 600; 310; 30; 20 INJECTION, SOLUTION INTRAVENOUS at 07:15

## 2022-02-17 RX ADMIN — FENTANYL CITRATE 100 MCG: 50 INJECTION, SOLUTION INTRAMUSCULAR; INTRAVENOUS at 08:46

## 2022-02-17 RX ADMIN — CEFAZOLIN 1 G: 1 INJECTION, POWDER, FOR SOLUTION INTRAMUSCULAR; INTRAVENOUS at 16:13

## 2022-02-17 RX ADMIN — DIPHENHYDRAMINE HYDROCHLORIDE 12.5 MG: 50 INJECTION, SOLUTION INTRAMUSCULAR; INTRAVENOUS at 11:24

## 2022-02-17 RX ADMIN — DEXAMETHASONE SODIUM PHOSPHATE 8 MG: 4 INJECTION, SOLUTION INTRAMUSCULAR; INTRAVENOUS at 08:09

## 2022-02-17 RX ADMIN — Medication 80 MCG: at 09:21

## 2022-02-17 RX ADMIN — PREGABALIN 75 MG: 75 CAPSULE ORAL at 07:14

## 2022-02-17 RX ADMIN — FENTANYL CITRATE 50 MCG: 50 INJECTION, SOLUTION INTRAMUSCULAR; INTRAVENOUS at 10:17

## 2022-02-17 RX ADMIN — ACETAMINOPHEN 650 MG: 325 TABLET ORAL at 19:04

## 2022-02-17 RX ADMIN — FAMOTIDINE 20 MG: 20 TABLET ORAL at 19:04

## 2022-02-17 RX ADMIN — OXYCODONE 10 MG: 5 TABLET ORAL at 14:57

## 2022-02-17 RX ADMIN — SODIUM CHLORIDE, POTASSIUM CHLORIDE, SODIUM LACTATE AND CALCIUM CHLORIDE: 600; 310; 30; 20 INJECTION, SOLUTION INTRAVENOUS at 08:45

## 2022-02-17 RX ADMIN — INSULIN LISPRO 4 UNITS: 100 INJECTION, SOLUTION INTRAVENOUS; SUBCUTANEOUS at 22:32

## 2022-02-17 RX ADMIN — OXYCODONE HYDROCHLORIDE 20 MG: 10 TABLET, FILM COATED, EXTENDED RELEASE ORAL at 07:14

## 2022-02-17 RX ADMIN — PREGABALIN 75 MG: 75 CAPSULE ORAL at 22:23

## 2022-02-17 RX ADMIN — SODIUM CHLORIDE 125 ML/HR: 9 INJECTION, SOLUTION INTRAVENOUS at 16:13

## 2022-02-17 RX ADMIN — PANTOPRAZOLE SODIUM 40 MG: 40 TABLET, DELAYED RELEASE ORAL at 22:23

## 2022-02-17 RX ADMIN — ROSUVASTATIN CALCIUM 40 MG: 10 TABLET, FILM COATED ORAL at 22:23

## 2022-02-17 RX ADMIN — Medication 20 MG: at 09:54

## 2022-02-17 RX ADMIN — Medication 20 MG: at 09:48

## 2022-02-17 RX ADMIN — OMEGA-3-ACID ETHYL ESTERS 1000 MG: 1 CAPSULE, LIQUID FILLED ORAL at 19:05

## 2022-02-17 RX ADMIN — SODIUM CHLORIDE, PRESERVATIVE FREE 10 ML: 5 INJECTION INTRAVENOUS at 19:06

## 2022-02-17 RX ADMIN — ROPIVACAINE HYDROCHLORIDE 20 ML: 2 INJECTION, SOLUTION EPIDURAL; INFILTRATION at 08:09

## 2022-02-17 NOTE — ANESTHESIA PROCEDURE NOTES
Peripheral Block    Start time: 2/17/2022 8:01 AM  End time: 2/17/2022 8:09 AM  Performed by: Ricki Pandey MD  Authorized by: Ricki Pandey MD       Pre-procedure:    Indications: at surgeon's request and post-op pain management    Preanesthetic Checklist: patient identified, risks and benefits discussed, site marked, timeout performed, anesthesia consent given and patient being monitored    Timeout Time: 08:01 EST          Block Type:   Block Type:  IPACK  Laterality:  Right  Monitoring:  Continuous pulse ox, frequent vital sign checks, heart rate and responsive to questions  Injection Technique:  Single shot  Procedures: ultrasound guided    Patient Position: supine  Prep: chlorhexidine    Location:  Lower thigh  Needle Type:  Stimuplex  Needle Gauge:  21 G  Needle Localization:  Ultrasound guidance  Medication Injected:  Dexamethasone (DECADRON) 4 mg/mL injection, 8 mg  Med Admin Time: 2/17/2022 8:09 AM    Assessment:  Number of attempts:  1  Injection Assessment:  Incremental injection every 5 mL, local visualized surrounding nerve on ultrasound, negative aspiration for blood, no paresthesia and no intravascular symptoms  Patient tolerance:  Patient tolerated the procedure well with no immediate complications

## 2022-02-17 NOTE — PROGRESS NOTES
Problem: Mobility Impaired (Adult and Pediatric)  Goal: *Acute Goals and Plan of Care (Insert Text)  Description: FUNCTIONAL STATUS PRIOR TO ADMISSION: Patient was independent and active without use of DME.    HOME SUPPORT PRIOR TO ADMISSION: The patient lived with wife but did not require assist.    Physical Therapy Goals  Initiated 2/17/2022    1. Patient will move from supine to sit and sit to supine  in bed with modified independence within 4 days. 2. Patient will perform sit to stand with modified independence within 4 days. 3. Patient will ambulate with modified independence for 100 feet with the least restrictive device within 4 days. 4. Patient will ascend/descend 4 stairs with 1 handrail(s) with minimal assistance/contact guard assist within 4 days. 5. Patient will perform home exercise program per protocol with independence within 4 days. 6. Patient will demonstrate AROM 0-90 degrees in operative joint within 4 days. Outcome: Progressing Towards Goal   PHYSICAL THERAPY EVALUATION  Patient: Lissett Arauz (84 y.o. male)  Date: 2/17/2022  Primary Diagnosis: Primary osteoarthritis of right knee [M17.11]  Procedure(s) (LRB):  RIGHT TOTAL KNEE ARTHROPLASTY MAKOPLASTY (Right) Day of Surgery   Precautions:   WBAT,Fall (limit knee flexion to 90 degrees; use walker X6 weeks)    ASSESSMENT  Based on the objective data described below, the patient presents with decreased ROM and strength to RLE, decreased bed mobility, transfers and gait following admission for right TKA, makoplasty. Patient was seen in PACU in anticipation of  possible same day discharge, but he is currently lacking active dorsiflexion to right foot and is at high risk for falls. Recommend overnight admission and will likely clear PT in the AM.  He will need a rolling walker and OP PT upon discharge home.     Current Level of Function Impacting Discharge (mobility/balance): Educated patient regarding bed exercises and limit of knee flexion to 90 degrees on operative knee. Also instructed him to use rolling walker X6 weeks per MD surgical report. Handout provided and he expressed understanding. He was able to stand and take a few steps with rolling walker +2 min assist.  Has to hike hip to clear right foot. Returned to supine with min assist.  Vitals stable but did report feeling a bit light headed when up. Functional Outcome Measure: The patient scored 50/100 on the Barthel outcome measure. Other factors to consider for discharge: lacking active dorsiflexion on RLE     Patient will benefit from skilled therapy intervention to address the above noted impairments. PLAN :  Recommendations and Planned Interventions: bed mobility training, transfer training, gait training, and therapeutic exercises      Frequency/Duration: Patient will be followed by physical therapy:  twice daily to address goals. Recommendation for discharge: (in order for the patient to meet his/her long term goals)  Outpatient physical therapy follow up recommended for TKA    This discharge recommendation:  Has not yet been discussed the attending provider and/or case management    IF patient discharges home will need the following DME: rolling walker         SUBJECTIVE:   Patient stated I am a light weight when it comes to medicine; I feel a little woozy.     OBJECTIVE DATA SUMMARY:   HISTORY:    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    Nares    ZAK on CPAP     Type 2 diabetes mellitus ([address]      Past Surgical History:   Procedure Laterality Date    HX AFIB ABLATION      HX AMPUTATION FINGER Left 1986    Ring finger    HX ANGIOPLASTY      HX HEART CATHETERIZATION Left 12/10/2021    HX ORTHOPAEDIC      L Knee Arthroscopy    [place] CARDIAC SURG PROCEDURE UNLIST      3 stents placed ,       Personal factors and/or comorbidities impacting plan of care: cardiac history; lacking active dorsiflexion on operative leg; high fall risk    Home Situation  Rails to Enter: Yes  Current DME Used/Available at Home: [place], straight,CPAP,Shower chair    EXAMINATION/PRESENTATION/DECISION MAKING:   Critical Behavior:  Neurologic State: Appropriate for age,Alert  Orientation Level: Oriented X4     Safety/Judgement: Good awareness of safety precautions,Insight into deficits     Range Of Motion:  AROM: Within functional limits (has only trace of dorsiflexion on operative leg)              RLE AROM  R Knee Flexion: 80  R Knee Extension: 7        Strength:    Strength: Within functional limits (able to SLR on operative leg)                    Tone & Sensation:   Tone: Normal              Sensation: Impaired (can feel gross touch but also reports neuropathy to both feet)                       Functional Mobility:  Bed Mobility:     Supine to Sit: Contact guard assistance  Sit to Supine: Minimum assistance  Scooting: Contact guard assistance  Transfers:  Sit to Stand: Assist x2;Minimum assistance  Stand to Sit: Assist x2;Minimum assistance                       Balance:   Sitting: Intact  Standing: Intact; With support  Ambulation/Gait Training:  Distance (ft): 4 Feet (ft)  Assistive Device: Gait belt;Walker, rolling  Ambulation - Level of Assistance: Assist x2;Minimal assistance; Moderate assistance        Gait Abnormalities: Decreased step clearance; Step to gait  Right Side Weight Bearing: As tolerated                                              Therapeutic Exercises:    Ankle pumps,  quad and heel sets, heel slides, SLR    Functional Measure:  Barthel Index:    Bathin  Bladder: 10  Bowels: 10  Groomin  Dressin  Feeding: 10  Mobility: 0  Stairs: 0  Toilet Use: 5  Transfer (Bed to Chair and Back): 5  Total: 50/100       The Barthel ADL Index: Guidelines  1. The index should be used as a record of what a patient does, not as a record of what a patient could do. 2. The main aim is to establish degree of independence from any help, physical or verbal, however minor and for whatever reason. 3. The need for supervision renders the patient not independent. 4. A patient's performance should be established using the best available evidence. Asking the patient, friends/relatives and nurses are the usual sources, but direct observation and common sense are also important. However direct testing is not needed. 5. Usually the patient's performance over the preceding 24-48 hours is important, but occasionally longer periods will be relevant. 6. Middle categories imply that the patient supplies over 50 per cent of the effort. 7. Use of aids to be independent is allowed. Score Interpretation (from [address] [facility])    Independent   60-79 Minimally independent   40-59 Partially dependent   20-39 Very dependent   <20 Totally dependent     -Jose Alberto Fish., Barthel, D.W. (1965). Functional evaluation: the Barthel Index. [facility] ([address] (1997). The Barthel activities of daily living index: self-reporting versus actual performance in the old (> or = 75 years). Journal of [address] [place] [address] 45(7), [facility], [facility]., Niko Reap. (1999). Measuring the change in disability after inpatient rehabilitation; comparison of the responsiveness of the Barthel Index and Functional [place] Measure. Journal of Neurology, Neurosurgery, and Psychiatry, 66(4), 134-671. [employer] SHAD Dalton.JOHANNE, ELODIA Borden, & Leticia Sandhu MKarleeA. (2004) Assessment of post-stroke quality of life in cost-effectiveness studies: The usefulness of the Barthel Index and the EuroQoL-5D.  Quality of Life Research, 13, 450-67           Physical Therapy Evaluation Charge Determination   History Examination Presentation Decision-Making   MEDIUM  Complexity : 1-2 comorbidities / personal factors will impact the outcome/ POC  LOW Complexity : 1-2 Standardized tests and measures addressing body structure, function, activity limitation and / or participation in recreation  LOW Complexity : Stable, uncomplicated  Other outcome measures Barthel  LOW       Based on the above components, the patient evaluation is determined to be of the following complexity level: LOW     Pain Ratin/10    Activity Tolerance:   Good    After treatment patient left in no apparent distress:   Supine in bed, Call bell within reach, and Side rails x 3    COMMUNICATION/EDUCATION:   The patients plan of care was discussed with: Registered nurse. Fall prevention education was provided and the patient/caregiver indicated understanding. and Patient/family agree to work toward stated goals and plan of care.     Thank you for this referral.  Dulce Colin, PT   Time Calculation: 25 mins

## 2022-02-17 NOTE — PERIOP NOTES
Radiology here for films. 1120 - Dr Rangel Knight at bedside to see pt. Made aware of pt's complaint of \"itchy toes and feet\" on both feet, no rash, no redness, pt restless with itching sensation, order received to medicate with Benadryl and to repeat x 1 if needed. 1150 - Dr Montague Journey to bedside to see pt, assessed RLE, reviewed with pt that he may go home later today if he passes PT. Pt aware and agreeable. Continues to doze and denies pain when awakened. 1220 - Pt resting soundly, when awakened, reports pain 5/10 to right knee but reports that it is tolerable and declines offers for medications, VSS, pt with movement of right knee and foot, will update spouse and speak with PT.    1330 - Lunch tray ordered for pt, wife updated and given paper D/C to read over, pt remains unable to pull right foot up toward face, will reassess for PT at a later time. 1420 - Pt tolerated all of lunch tray, taking good PO, attempted to void via urinal but unable, pt repositioned. Denies pain. Remains unable to dorsiflex right foot. Aware of plan at this point, wife aware, questions/concerns addressed. 1445 - Pt able to void per urinal, aware that PT is coming to see him regarding safety when going home or to stay overnight. Updated wife once again regarding plan, questions/concerns addressed. 1500 - Medicated as per [facility], PT here. Report to [employer]. Spouse updated again regarding PT being at bedside for evaluation.

## 2022-02-17 NOTE — ANESTHESIA PREPROCEDURE EVALUATION
Relevant Problems   RESPIRATORY SYSTEM   (+) ZAK (obstructive sleep apnea)      CARDIOVASCULAR   (+) Angina pectoris, unspecified   (+) Atherosclerotic heart disease of native coronary artery with unspecified angina pectoris   (+) Coronary artery disease involving native coronary artery of native heart without angina pectoris   (+) HTN (hypertension), benign   (+) Paroxysmal atrial fibrillation (HCC)      GASTROINTESTINAL   (+) Gastroesophageal reflux disease without esophagitis      RENAL FAILURE   (+) CKD (chronic kidney disease) stage 3, GFR 30-59 ml/min (HCC)      ENDOCRINE   (+) Arthritis   (+) Class 2 severe obesity due to excess calories with serious comorbidity and body mass index (BMI) of 35.0 to 35.9 in adult (HCC)   (+) 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 without macular edema, with long-term current use of insulin (HCC)   (+) Type 2 diabetes mellitus with stage 3 chronic kidney disease, with long-term current use of insulin (HCC)   (+) Type 2 diabetes with nephropathy (HCC)       Anesthetic History   No history of anesthetic complications            Review of Systems / Medical History  Patient summary reviewed and pertinent labs reviewed    Pulmonary        Sleep apnea: CPAP           Neuro/Psych   Within defined limits           Cardiovascular    Hypertension  Valvular problems/murmurs: aortic stenosis      Dysrhythmias : atrial fibrillation  CAD and cardiac stents  Pertinent negatives: No angina  Exercise tolerance: >4 METS  Comments: -3 cardiac stents; 7666,[id number]  -Last Plavix 2/10/22  -AFIB ablation   Echo in Nov 2021 shows normal EF and mild-mod AS  Cath in Dec 2021:   -  Mid into distal LAD with a long segment of previously placed stent that shows mild to moderate in-stent restenosis with severe in-stent restenosis at the very apical aspect of previously placed stent  - Focal 70% stenosis in distal right coronary artery2.   Mid into distal LAD with a long segment of previously placed stent that shows mild to moderate in-stent restenosis with severe in-stent restenosis at the very apical aspect of previously placed stent  >>> Marie Justin recommended no intervention unless the patient began having chest pain due to his upcoming knee surgery and that he would have to remain on DAPT. 3.  Focal 70% stenosis in distal right coronary artery   GI/Hepatic/Renal     GERD: well controlled    Renal disease: CRI       Endo/Other    Diabetes: using insulin    Morbid obesity and arthritis    Comments:  Will turn insulin pump off; FSBS 149  Other Findings            Physical Exam    Airway  Mallampati: III    Neck ROM: normal range of motion   Mouth opening: Normal     Cardiovascular    Rhythm: regular  Rate: normal         Dental  No notable dental hx       Pulmonary  Breath sounds clear to auscultation               Abdominal  GI exam deferred       Other Findings            Anesthetic Plan    ASA: 3  Anesthesia type: regional and general - IPACK block      Post-op pain plan if not by surgeon: regional    Induction: Intravenous  Anesthetic plan and risks discussed with: Patient

## 2022-02-17 NOTE — PERIOP NOTES
1500 Report from Arianna Garcia RN. PT here and working with patient. 1520 Patient up with PT and took a few hops, could not bare weight on right foot. PT stated it would be safer for patient to stay and be reassessed in the morning. 1530 IS started. Educated patient on proper use and encouraged to use while awake. 32 [phone] Wife at bedside and informed of patient needing to stay overnight. 1600 Patient put CPAP on.  1630 No changes. Patient resting comfortably. Wife remains at bedside. Informed 4th floor of admission to room 411.  1650 Assisted patient to get his undergarments and shorts on. Placed blue wedge from PT under operative side. 1719 Report to Carlene Gottlieb RN  9391 Transferred to 4th floor. 1805 Remained at bedside with Connie Rios RN to assist patient to use urinal. Updated Connie Rios on patient.

## 2022-02-17 NOTE — DISCHARGE INSTRUCTIONS
TOTAL KNEE DISCHARGE INSTRUCTIONS        Please take the time to review the following instructions before you leave the hospital and use them as guidelines during your recovery from surgery. If you have any questions you may contact my office at [phone]  After business hours or during the weekend you can contact me through [address] [facility] or text / call at [phone] (cell phone) for emergency's. Please use the office number during regular business hours. SPECIAL INSTRUCTIONS :   1. Full extension at the knee is the most important aspect of your range of motion. Avoid placing a pillow or bump behind the knee. Rather, place the heel up on a bump or pillow and allow gravity to help straighten the knee. 2. You may weight bear as tolerated on the knee and during the day you should bend the knee as much as possible. 3. Drainage from the incision more than 4 days from surgery is concerning. Contact my office if there is any question [phone].   4. You may contact me directly through [employer] if there are specific questions or text / call using my cell number [phone]. DRESSING :     Post-op Dressings : This should be removed by physical therapy or you may remove this yourself 7 days after the date of your surgery. If there is no drainage, then a simple dressing may be used or no dressing at all. Other dressing options can be purchased over the counter at a local pharmacy or medical supply vendor. A porous adhesive dressing such as pictured above can be purchased online ([address] [facility] [address]) or at your local [facility] or [facility]'s. You only need to keep the incision covered for 7 days after showers. A dressing may be used for longer if there are issues with clothing clinging to the incision. Showering/ Bathing: You may shower with the Post-op dressing in place.  This is left in place for 7 days following discharge from the hospital. If your incision is dry without drainage you may shower following your discharge home. After 7 days your dressing should be removed for showering. It is fine to have water run over the incision. Do not vigorously scrub your incision. Apply a clean, dry dressing after you have dried your incision. Do not take a bath or get into a swimming pool / [place] Racer until you follow up with Dr. Donald Ware. Do not soak your incision under water. If there is continued drainage or you are concerned contact Dr Frederic Israel office prior to showering [phone] . Diet:  You may advance to your regular diet as tolerated. Increase your clear liquid intake for the next 2-3 days. Medication:      1. You will be given prescriptions for pain medication when you are discharged from the hospital. The side effects of these medications can be substantial and the narcotic medications are not mandatory. You may substitute these medications with Tylenol or Alleve / Motrin. 2. Please use the medications as prescribed. Pain medications may cause constipation- Colace twice daily and Miralax one scoop daily while taking the narcotic medication should help prevent constipation. Please discuss with your local pharmacist regarding increasing this dosage if constipation persists. Other possible side effects of pain medication are dizziness, headache, nausea, vomiting, and urinary retention. Discontinue the pain medication if you develop itching, rash, shortness of breath, or difficulties swallowing. If these symptoms become severe or are not relieved by discontinuing the medication, you should seek immediate medical attention. 3. Refills of pain medication are authorized during office hours only (8 AM- 5 PM  Monday thru Friday). Many of these medication will require you or a family member to pick-up a physical prescription at the office. 4. Medications other than antiinflammatories will not be called into the pharmacy after business hours.    5. You may resume the medication(s) you were taking prior to your surgery. Narcotics may change the effects of some antidepressant medication(s). If you have any questions about possible interactions between your regular medications and the pain medication, you should ask the pharmacist or contact the prescribing physician. 6. If you have constipation which is not improved by oral stool softeners then a Ducolax suppository should be purchased over the counter. 7. Continue the blood thinner (Aspirin or Lovenox) for a total of 30 days following surgery. Follow up appointment:    Please call our office at [phone] for your follow up appointment. This should be scheduled 14 days following the date of surgery. You should also have a scheduled appointment for physical therapy following surgery. Physical Therapy / Nursing:    Physical Therapy following surgery will be arranged as an outpatient or at home. They have specific instructions for rehab and wound care. It is fine to have physical therapy remove your dressing at 7 days following surgery. Returning to work:    Normal return to work is 6-12 weeks following surgery. Depending on your progression following surgery and specific job duties you may take longer for a full return to work. DRIVING    You should not return to driving until you are off all opioid pain medications and able to safely and quickly apply the brakes. This is normally 2-6 weeks for left sided surgery and 2-8 weeks for right sided surgery. Important Signs and Symptoms:    If any of the following signs or symptoms occur, you should contact Dr. Barby Saab office.   Please be advised if a problem arises which you feel requires immediate medical attention or you are unable to contact Dr. Barby Saab office you should seek immediate medical attention at the ER or other health care facility you have access to.    1. A sudden increase in swelling and/or redness or warmth at the area your surgery was performed which isnt relieved by rest, ice, and elevation. 2. Oral temperature greater than 101 degrees for 12 hours or more which isnt relieved by an increase in fluid intake and taking 2 Tylenol every 4-6 hours. 3. Excessive drainage from your incisions, or drainage which hasnt stopped by 72 hours after your surgery. 4. Fever, chills, shortness of breath, chest pain, nausea, vomiting or other signs and symptoms which are of concern to you. YOUR TOTAL JOINT REPLACEMENT  FREQUENTLY ASKED QUESTIONS   What should I take for pain?  o You will be discharged with four medications for pain (Oxycodone, Tramadol, Ibuprofen and Tylenol). These may vary slightly depending on what you were taking in the hospital.   - 1st Line - Tylenol Arthritis Strength - 325-650 mg every 4 hours (scheduled for the 1st 24 hours)  - 2nd Line -  Ibuprofen 400 (2 tabs) - 800 (4 tabs) mg every 8 hours  (scheduled for the 1st 24 hours)  - 3rd Line - Oxycodone 5 mg (1-2 tablets every 4-6 hrs)  - 4th Line - Tramadol 50 mg (1-2 tablets every 4-6 hours) - take these between Oxycodone doses if your pain is not alleviated.  When should I call for advice regarding my pain?  o If your pain is still uncontrolled after being on the regimen above for at least 12 hours, please call the office [phone] or text / call my cell after hours [phone].  Can I get refills?  o Opioid refills are provided for the first 2-6 weeks following surgery. o Use Tylenol 500 mg along with Aleve 220mg twice daily or Motrin 200-800mg every 4-6 hours during the daytime hours after two weeks. - After two weeks, I suggest the opioid pain medications be used only 1 hour prior to your physical therapy appointment and 1 hour before sleeping at night. Use Tylenol and Ibuprofen at other times during the day. - Keep in mind that you will need to discontinue opioids before you resume driving.     Is swelling normal?  o Almost everyone has some degree of swelling following surgery. o Following hip and knee replacement surgery, swelling can be normal below the incision for the first few weeks. - This swelling peaks around 5-7 days after surgery. - It is not unusual to have some bruising about the back of the thigh, calf, ankle, and foot.  What should I do for the swelling?  o Keep the limb elevated above the level of your heart - 'Toes above Nose'. o Apply compression socks (knee high for total knees and up to the mid-thigh for total hips). o Use the ice packs that you are discharged home with several times a day for the first several weeks.  How long should I remain on blood thinners following surgery? o 30 days   Which blood thinners will I be on? Can I take them with Tylenol?  o  Aspirin 81 mg twice daily - these should be taken with meals and can be used with Tylenol. In certain instances, you may be sent home on Lovenox for 30 days. o For short periods of time (30 days), aspirin and anti-inflammatories (i.e. Aleve, Motrin / Advil / ibuprofen, diclofenac, etc. can be taken together).  When can I drive?  o Once you have stopped using regular narcotic pain medications (Oxycodone, Percocet, Lortab, Norco etc.) and can safely apply the brakes without hesitation, (emergency braking).  When can I shower?  o You may shower immediately if your Optifoam bandage is dry and without discharge. The Optifoam dressing should be removed 7 days following surgery, after which you may continue to shower.  o No submersion of the incision, bathing or swimming for 14 days following surgery or until cleared by Dr Susie Marrufo.    Can I remove this dressing?  o Yes, this is removed just like removing a band aid.  o If you are concerned, this can be removed by your therapist.   [facility] What do I do with the dressing when I shower?  o The Optifoam dressing is waterproof and you may shower with it.   o The incision is sealed with Dermabond, a biologic skin glue, which also serves as a watertight seal. If your incision is draining, it is no longer considered to be watertight - you should contact our office prior to showering if you experience any drainage.  Which dressing should I purchase after I remove my Optifoam?  o An occlusive dressing which covers your entire incision. This does not have to be waterproof, but will need to be removed when you shower and then replaced. (Example Only)   How active should I be following surgery? o Progress activities in moderation and at your own pace.   o Walking room to room in your house is encouraged. o Walk each day and set progressive goals with small increments (1st week - ?block of walking, 2nd week - 1 block, 3rd week - 2 blocks, etc.)   Will I need help at home?  o You will likely need a caretaker who should be available for the first week following surgery. It is fine for family members to work during the day, as long as they are available by phone. o Planning ahead makes coming home from the hospital a much easier transition.  How long will my surgery take?  o On average, total joint replacement takes approximately 1-2 hour.   o The entire process, including pre-op and post-op care can last as long as 4- 5 hours before you are transferred to your room. o stroke, pulmonary embolism (a clot going from the legs to the lungs), and even death with surgery.  Will I be given antibiotics? Will I need antibiotics at discharge?  o Antibiotics will be given to you both before and after your procedure. To further minimize the risk of infection, we have streamlined the surgical procedure to take less time in the operating room.    o You do not require antibiotics following surgery.       Please do not hesitate to contact me through [employer] or by text / call me at [phone] (cell phone) for questions following surgery - MD Manuel Arshad MD  Cell [phone]  Kin Mckeon PA-C  Cell [phone]  Medical Assistants: Garrett Cummins [phone]                         DISCHARGE SUMMARY from your Nurse    The following personal items collected during your admission are returned to you:   Dental Appliance: Dental Appliances: None  Vision: Visual Aid: Glasses,With patient  Hearing Aid:    Jewelry: Jewelry: Ring (with wife)  Clothing: Clothing: Undergarments,Footwear,Pants,Shirt,Socks  Other Valuables: Other Valuables: [address] (comment) (CPAP, with patient)  Valuables sent to safe:      PATIENT INSTRUCTIONS:    After general anesthesia or intravenous sedation, for 24 hours or while taking prescription Narcotics:  · Limit your activities  · Do not drive and operate hazardous machinery  · Do not make important personal or business decisions  · Do  not drink alcoholic beverages  · If you have not urinated within 8 hours after discharge, please contact your surgeon on call. Report the following to your surgeon:  · Excessive pain, swelling, redness or odor of or around the surgical area  · Temperature over 100.5  · Nausea and vomiting lasting longer than 4 hours or if unable to take medications  · Any signs of decreased circulation or nerve impairment to extremity: change in color, persistent  numbness, tingling, coldness or increase pain  · Any questions    COUGH AND DEEP BREATHE    Breathing deep and coughing are very important exercises to do after surgery. Deep breathing and coughing open the little air tubes and air sacks in your lungs. You take deep breaths every day. You may not even notice - it is just something you do when you sigh or yawn. It is a natural exercise you do to keep these air passages open. After surgery, take deep breaths and cough, on purpose. Coughing and deep breathing help prevent bronchitis and pneumonia after surgery.   If you had chest or belly surgery, use a pillow as a \"hug buddy\" and hold it tightly to your chest or belly when you cough. DIRECTIONS:  · Take 10 to 15 slow deep breaths every hour while awake. · Breathe in deeply, and hold it for 2 seconds. · Exhale slowly through puckered lips, like blowing up a balloon. · After every 4th or 5th deep breath, hug your pillow to your chest or belly and give a hard, deep cough. Yes, it will probably hurt. But doing this exercise is very important part of healing after surgery. Take your pain medicine to help you do this exercise without too much pain. IF YOU HAVE BEEN DIAGNOSED WITH SLEEP APNEA, PLEASE USE YOUR SLEEP APNEA DEVICE OR CPAP MACHINE WHEN YOU INTEND TO NAP AFTER TAKING PAIN MEDICATION. Ankle Pumps    Ankle pumps increase the circulation of oxygenated blood to your lower extremities and decrease your risk for circulation problems such as blood clots. They also stretch the muscles, tendons and ligaments in your foot and ankle, and prevent joint contracture in the ankle and foot, especially after surgeries on the legs. It is important to do ankle pump exercises regularly after surgery because immobility increases your risk for developing a blood clot. Your doctor may also have you take an Aspirin for the next few days as well. If your doctor did not ask you to take an Aspirin, consult with him before starting Aspirin therapy on your own. Slowly point your foot forward, feeling the muscles on the top of your lower leg stretch, and hold this position for 5 seconds. Next, pull your foot back toward you as far as possible, stretching the calf muscles, and hold that position for 5 seconds. Repeat with the other foot. Perform 10 repetitions every hour while awake for both ankles if possible (down and then up with the foot once is one repetition). You should feel gentle stretching of the muscles in your lower leg when doing this exercise. If you feel pain, or your range of motion is limited, don't  Push too hard. Only go the limit your joint and muscles will let you go. If you have increasing pain, progressively worsening leg warmth or swelling, STOP the exercise and call your doctor. Below is information about the medications your doctor is prescribing after your visit:    Other information in your discharge envelope:  []     PRESCRIPTIONS  []     SCHOOL/WORK NOTE  []     INFECTION PREVENTION  []     [address]  []     REGIONAL NERVE BLOCK ON QUE PAMPHLET   []     EXPAREL  []     HANDICAP APPLICATION         These are general instructions for a healthy lifestyle:    *  Please give a list of your current medications to your Primary Care Provider. *  Please update this list whenever your medications are discontinued, doses are      changed, or new medications (including over-the-counter products) are added. *  Please carry medication information at all times in case of emergency situations. About Smoking  No smoking / No tobacco products / Avoid exposure to second hand smoke    Surgeon General's Warning:  Quitting smoking now greatly reduces serious risk to your health. Obesity, smoking, and sedentary lifestyle greatly increases your risk for illness and disease. A healthy diet, regular physical exercise & weight monitoring are important for maintaining a healthy lifestyle. Congestive Heart Failure  You may be retaining fluid if you have a history of heart failure or if you experience any of the following symptoms:  Weight gain of 3 pounds or more overnight or 5 pounds in a week, increased swelling in our hands or feet or shortness of breath while lying flat in bed. Please call your doctor as soon as you notice any of these symptoms; do not wait until your next office visit.     Recognize signs and symptoms of STROKE:  F - face looks uneven  A - arms unable to move or move even  S - speech slurred or non-existent  T - time-call 911 as soon as signs and symptoms begin-DO NOT go         Back to bed or wait to see if you get better-TIME IS BRAIN. Warning signs of HEART ATTACK  Call 911 if you have these symptoms    · Chest discomfort. Most heart attacks involve discomfort in the center of the chest that lasts more than a few minutes, or that goes away and comes back. It can feel like uncomfortable pressure, squeezing, fullness, or pain. · Discomfort in other areas of the upper body. Symptoms can include pain or discomfort in one or both        Arms, the back, neck, jaw, or stomach. ·  Shortness of breath with or without chest discomfort. · Other signs may include breaking out in a cold sweat, nausea, or lightheadedness    Don't wait more than five minutes to call 911 - MINUTES MATTER! Fast action can save your life. Calling 911 is almost always the fastest way to get lifesaving treatment. Emergency Medical Services staff can begin treatment when they arrive - up to an hour sooner than if someone gets to the hospital by car. [facility] MEDICATION AND SIDE EFFECT GUIDE    The [facility] MEDICATION AND SIDE EFFECT GUIDE was provided to the PATIENT AND CARE PROVIDER.   Information provided includes instruction about drug purpose and common side effects for the following medications:    · Tylenol  · Aspirin  · Motrin  · Zofran  · Oxycodone  · Lyrica  · Tramadol

## 2022-02-17 NOTE — PROGRESS NOTES
Problem: Diabetes Self-Management  Goal: *Disease process and treatment process  Description: Define diabetes and identify own type of diabetes; list 3 options for treating diabetes. Outcome: Progressing Towards Goal  Goal: *Incorporating nutritional management into lifestyle  Description: Describe effect of type, amount and timing of food on blood glucose; list 3 methods for planning meals. Outcome: Progressing Towards Goal  Goal: *Incorporating physical activity into lifestyle  Description: State effect of exercise on blood glucose levels. Outcome: Progressing Towards Goal  Goal: *Developing strategies to promote health/change behavior  Description: Define the ABC's of diabetes; identify appropriate screenings, schedule and personal plan for screenings. Outcome: Progressing Towards Goal  Goal: *Using medications safely  Description: State effect of diabetes medications on diabetes; name diabetes medication taking, action and side effects. Outcome: Progressing Towards Goal  Goal: *Monitoring blood glucose, interpreting and using results  Description: Identify recommended blood glucose targets  and personal targets. Outcome: Progressing Towards Goal  Goal: *Prevention, detection, treatment of acute complications  Description: List symptoms of hyper- and hypoglycemia; describe how to treat low blood sugar and actions for lowering  high blood glucose level. Outcome: Progressing Towards Goal  Goal: *Prevention, detection and treatment of chronic complications  Description: Define the natural course of diabetes and describe the relationship of blood glucose levels to long term complications of diabetes.   Outcome: Progressing Towards Goal  Goal: *Developing strategies to address psychosocial issues  Description: Describe feelings about living with diabetes; identify support needed and support network  Outcome: Progressing Towards Goal  Goal: *Insulin pump training  Outcome: Progressing Towards Goal  Goal: *Sick day guidelines  Outcome: Progressing Towards Goal  Goal: *Patient Specific Goal (EDIT GOAL, INSERT TEXT)  Outcome: Progressing Towards Goal     Problem: Patient Education: Go to Patient Education Activity  Goal: Patient/Family Education  Outcome: Progressing Towards Goal     Problem: Falls - Risk of  Goal: *Absence of Falls  Description: Document Sofia Fothergill Fall Risk and appropriate interventions in the flowsheet.   Outcome: Progressing Towards Goal  Note: Fall Risk Interventions:  Mobility Interventions: Bed/chair exit alarm,Patient to call before getting OOB,PT Consult for mobility concerns,PT Consult for assist device competence,Utilize walker, cane, or other assistive device,Utilize gait belt for transfers/ambulation         Medication Interventions: Bed/chair exit alarm,Patient to call before getting OOB,Teach patient to arise slowly,Utilize gait belt for transfers/ambulation    Elimination Interventions: Bed/chair exit alarm,Call light in reach,Patient to call for help with toileting needs,Urinal in reach              Problem: Patient Education: Go to Patient Education Activity  Goal: Patient/Family Education  Outcome: Progressing Towards Goal     Problem: Patient Education: Go to Patient Education Activity  Goal: Patient/Family Education  Outcome: Progressing Towards Goal     Problem: Hypertension  Goal: *Blood pressure within specified parameters  Outcome: Progressing Towards Goal  Goal: *Fluid volume balance  Outcome: Progressing Towards Goal  Goal: *Labs within defined limits  Outcome: Progressing Towards Goal     Problem: Patient Education: Go to Patient Education Activity  Goal: Patient/Family Education  Outcome: Progressing Towards Goal     Problem: Patient Education: Go to Patient Education Activity  Goal: Patient/Family Education  Outcome: Progressing Towards Goal     Problem: Knee Replacement: Day of Surgery/Unit  Goal: Off Pathway (Use only if patient is Off Pathway)  Outcome: Progressing Towards Goal  Goal: Activity/Safety  Outcome: Progressing Towards Goal  Goal: Consults, if ordered  Outcome: Progressing Towards Goal  Goal: Diagnostic Test/Procedures  Outcome: Progressing Towards Goal  Goal: Nutrition/Diet  Outcome: Progressing Towards Goal  Goal: Medications  Outcome: Progressing Towards Goal  Goal: Respiratory  Outcome: Progressing Towards Goal  Goal: Treatments/Interventions/Procedures  Outcome: Progressing Towards Goal  Goal: Psychosocial  Outcome: Progressing Towards Goal  Goal: *Initiate mobility  Outcome: Progressing Towards Goal  Goal: *Optimal pain control at patient's stated goal  Outcome: Progressing Towards Goal  Goal: *Hemodynamically stable  Outcome: Progressing Towards Goal     Problem: Knee Replacement: Post-Op Day 1  Goal: Off Pathway (Use only if patient is Off Pathway)  Outcome: Progressing Towards Goal  Goal: Activity/Safety  Outcome: Progressing Towards Goal  Goal: Diagnostic Test/Procedures  Outcome: Progressing Towards Goal  Goal: Nutrition/Diet  Outcome: Progressing Towards Goal  Goal: Medications  Outcome: Progressing Towards Goal  Goal: Respiratory  Outcome: Progressing Towards Goal  Goal: Treatments/Interventions/Procedures  Outcome: Progressing Towards Goal  Goal: Psychosocial  Outcome: Progressing Towards Goal  Goal: Discharge Planning  Outcome: Progressing Towards Goal  Goal: *Demonstrates progressive activity  Outcome: Progressing Towards Goal  Goal: *Optimal pain control at patient's stated goal  Outcome: Progressing Towards Goal  Goal: *Hemodynamically stable  Outcome: Progressing Towards Goal  Goal: *Discharge plan identified  Outcome: Progressing Towards Goal     Problem: Knee Replacement: Post-Op Day 2  Goal: Off Pathway (Use only if patient is Off Pathway)  Outcome: Progressing Towards Goal  Goal: Activity/Safety  Outcome: Progressing Towards Goal  Goal: Diagnostic Test/Procedures  Outcome: Progressing Towards Goal  Goal: Medications  Outcome: Progressing Towards Goal  Goal: Respiratory  Outcome: Progressing Towards Goal  Goal: Treatments/Interventions/Procedures  Outcome: Progressing Towards Goal  Goal: Psychosocial  Outcome: Progressing Towards Goal  Goal: *Met physical therapy criteria for discharge to the next level of care  Outcome: Progressing Towards Goal  Goal: *Optimal pain control with oral analgesia  Outcome: Progressing Towards Goal  Goal: *Hemodynamically stable  Outcome: Progressing Towards Goal  Goal: *Tolerating diet  Outcome: Progressing Towards Goal  Goal: *Patient verbalizes understanding of discharge instructions  Outcome: Progressing Towards Goal     Problem: Pain  Goal: *Control of Pain  Outcome: Progressing Towards Goal  Goal: *PALLIATIVE CARE:  Alleviation of Pain  Outcome: Progressing Towards Goal     Problem: Patient Education: Go to Patient Education Activity  Goal: Patient/Family Education  Outcome: Progressing Towards Goal

## 2022-02-17 NOTE — ANESTHESIA POSTPROCEDURE EVALUATION
Procedure(s):  RIGHT TOTAL KNEE ARTHROPLASTY MAKOPLASTY. regional, general    Anesthesia Post Evaluation      Multimodal analgesia: multimodal analgesia not used between 6 hours prior to anesthesia start to PACU discharge  Patient location during evaluation: PACU  Patient participation: complete - patient participated  Level of consciousness: awake  Pain management: adequate  Airway patency: patent  Anesthetic complications: no  Cardiovascular status: acceptable, blood pressure returned to baseline and hemodynamically stable  Respiratory status: acceptable  Hydration status: acceptable  Post anesthesia nausea and vomiting:  controlled      INITIAL Post-op Vital signs:   Vitals Value Taken Time   /67 02/17/22 1400   Temp 36.7 °C (98 °F) 02/17/22 1126   Pulse 79 02/17/22 1404   Resp 22 02/17/22 1404   SpO2 96 % 02/17/22 1404   Vitals shown include unvalidated device data.

## 2022-02-17 NOTE — OP NOTES
OPERATIVE REPORT     Admit Date: 2/17/2022  Admit Diagnosis: Primary osteoarthritis of right knee [M17.11]    Date of Procedure: 2/17/2022   Preoperative Diagnosis: Primary osteoarthritis of right knee [M17.11]  Postoperative Diagnosis: Primary osteoarthritis of right knee [M17.11]    Procedure: Procedure(s):  RIGHT TOTAL KNEE ARTHROPLASTY MAKOPLASTY  Surgeon: Gudelia Chao MD  Assistant(s): Annamarie Thomas PA-C  Anesthesia: General   Estimated Blood Loss: 300cc  Specimens: * No specimens in log *   Complications: None       INDICATIONS:   The patient is a 55 y.o., male who has complained of a long history of knee pain. The patient  has failed conservative treatment and presents for definitive operative care. Informed consent obtained including a discussion of the risks and benefits, which include, but are not limited to, bleeding, infection, neurovascular damage, wound complications, pain and stiffness in the knee, periprosthetic loosening, fracture dislocation and DVT, the patient consented for the procedure. DESCRIPTION OF PROCEDURE:        The patient was seen in the preoperative holding area. The patient was positively identified. The limb was initialed,  questions were answered. The patient was given Ancef preop for an antibiotic. The patient was subsequently taken to the operating room. The patient underwent spinal anesthesia. The patient was positioned in the supine position. All bony prominences were well padded. The limb was prepped and draped in a sterile fashion. The appropriate pause for safety was performed. A mid-line incision was created. Utilizing an incision from above the superior pole of the patella distally to the tibial tubercle. The incision was taken down through the skin and subcutaneous tissue until the retinaculum could be identified. This was sharply incised utilizing a medial parapatellar incision. The femoral array was placed at the superior portion of the patella.  The patellar was everted, a planar resurfacing was performed and the drill guide was placed with the appropriate rotation. The medial-based soft tissue was then retracted as well as well as the lateral tissue. Linwood pins were placed within the incision for thetibial array (one hand breadth proximal to the superior pole of the patella). The femoral and tibial trackers were placed. The hip center or rotation was established. Registration was performed on the femur and tibia. Osteophytes were removed. The knee was balanced in both flexion and extension with force applied. The computer model of implants and position was verified. Once this was complete the MAKOplasty robot was positioned. Standard cuts were made for the tibia first. The tibial cut was removed. The remaining femoral cuts were made with the robot. The blade was changed and the medial meniscus was removed. The tibial preparation was completed. Including removal of residual medial and lateral mensci. Tibial baseplate placement and keel preparation were performed along with drill holes for the tibial baseplate. Final bony osteophytes were removed and the meniscal rim was removed. The knee was injected with 60cc of Morphine / Ketoralac and Lidocaine. Trial implants were placed and range of motion along with overall fit was established with very good integrity of the MCL noted. The linwood pins and trackers were removed and accounted for prior to closure. All the bony surfaces were irrigated. The tibia was placed first, then the poly, residual osteophytes were removed and then the femur. Finally, the patella was placed and press-fit with the clamp / impaction. There was normal tracking of the patella at the conclusion of the case. The knee was very stable after it was taken through a full range of motion. The wound was closed with 0 Vicryl and then number 2 Quill proximally.  Once this had been completed, the skin was closed with 2-0 Vicryl 4-0 monocryl suture and Dermabond. A sterile dressing was applied. The patient was taken from the operating room in stable condition. OPERATIVE FINDINGS : Severe varus OA of the knee, Final ROM 0-130    IMPLANTS :   Implant Name Type Inv. Item Serial No.  Lot No. LRB No. Used Action   INSERT TIB CR 6 11 MM KNEE 5/PK X3 TRIATHLON - SN/A  INSERT TIB CR 6 11 MM KNEE 5/PK X3 TRIATHLON N/A [employer] [id number] Right 1 Implanted   PAT ASYM MTL-BK 11MM SZ A38 -- TRIATHLON - SN/A  PAT ASYM MTL-BK 11MM SZ A38 -- TRIATHLON N/A [employer] [id number] Right 1 Implanted   BASEPLATE TIB SZ 6 [id number] ML77MM KNEE TRITANIUM 4 CRUCFRM - SN/A  BASEPLATE TIB SZ 6 [id number] ML77MM KNEE TRITANIUM 4 CRUCFRM N/A [employer] [id number] Right 1 Implanted   COMPONENT FEM SZ 6 R KNEE CRUCE RET CEMENTLESS BEAD W/ CARLOS - SN/A  COMPONENT FEM SZ 6 R KNEE CRUCE RET CEMENTLESS BEAD W/ CARLOS N/A [employer] [id number] Right 1 Implanted       POST OPERATIVE CONSIDERATIONS :  WBAT w/ walker x 6 weeks, BMI>35 and weight > 225    JUSTIFICATION FOR SURGICAL ASSISTANT:   Surgical Assistant, was requried and necessary in this case, to help with soft tissue retraction, extremity positioning, equiment management, implant management, and wound closure.     Axel Ormond, MD

## 2022-02-18 VITALS
HEART RATE: 75 BPM | BODY MASS INDEX: 37.46 KG/M2 | DIASTOLIC BLOOD PRESSURE: 65 MMHG | RESPIRATION RATE: 18 BRPM | OXYGEN SATURATION: 94 % | SYSTOLIC BLOOD PRESSURE: 110 MMHG | WEIGHT: 261.69 LBS | HEIGHT: 70 IN | TEMPERATURE: 97.5 F

## 2022-02-18 LAB
ANION GAP SERPL CALC-SCNC: 7 MMOL/L (ref 5–15)
BUN SERPL-MCNC: 32 MG/DL (ref 6–20)
BUN/CREAT SERPL: 23 (ref 12–20)
CALCIUM SERPL-MCNC: 8.6 MG/DL (ref 8.5–10.1)
CHLORIDE SERPL-SCNC: 104 MMOL/L (ref 97–108)
CO2 SERPL-SCNC: 27 MMOL/L (ref 21–32)
CREAT SERPL-MCNC: 1.41 MG/DL (ref 0.7–1.3)
GLUCOSE BLD STRIP.AUTO-MCNC: 196 MG/DL (ref 65–117)
GLUCOSE SERPL-MCNC: 210 MG/DL (ref 65–100)
HGB BLD-MCNC: 12.9 G/DL (ref 12.1–17)
POTASSIUM SERPL-SCNC: 4.3 MMOL/L (ref 3.5–5.1)
SERVICE CMNT-IMP: ABNORMAL
SODIUM SERPL-SCNC: 138 MMOL/L (ref 136–145)

## 2022-02-18 PROCEDURE — 80048 BASIC METABOLIC PNL TOTAL CA: CPT

## 2022-02-18 PROCEDURE — 82962 GLUCOSE BLOOD TEST: CPT

## 2022-02-18 PROCEDURE — 36415 COLL VENOUS BLD VENIPUNCTURE: CPT

## 2022-02-18 PROCEDURE — 97535 SELF CARE MNGMENT TRAINING: CPT

## 2022-02-18 PROCEDURE — 74011000250 HC RX REV CODE- 250: Performed by: PHYSICIAN ASSISTANT

## 2022-02-18 PROCEDURE — 85018 HEMOGLOBIN: CPT

## 2022-02-18 PROCEDURE — 74011250637 HC RX REV CODE- 250/637: Performed by: PHYSICIAN ASSISTANT

## 2022-02-18 PROCEDURE — G0378 HOSPITAL OBSERVATION PER HR: HCPCS

## 2022-02-18 PROCEDURE — 97116 GAIT TRAINING THERAPY: CPT

## 2022-02-18 PROCEDURE — 74011636637 HC RX REV CODE- 636/637: Performed by: PHYSICIAN ASSISTANT

## 2022-02-18 PROCEDURE — 97530 THERAPEUTIC ACTIVITIES: CPT

## 2022-02-18 PROCEDURE — 97110 THERAPEUTIC EXERCISES: CPT

## 2022-02-18 PROCEDURE — 97165 OT EVAL LOW COMPLEX 30 MIN: CPT

## 2022-02-18 PROCEDURE — 74011250636 HC RX REV CODE- 250/636: Performed by: PHYSICIAN ASSISTANT

## 2022-02-18 PROCEDURE — 74011000258 HC RX REV CODE- 258: Performed by: PHYSICIAN ASSISTANT

## 2022-02-18 RX ADMIN — ACETAMINOPHEN 650 MG: 325 TABLET ORAL at 00:41

## 2022-02-18 RX ADMIN — INSULIN LISPRO 2 UNITS: 100 INJECTION, SOLUTION INTRAVENOUS; SUBCUTANEOUS at 11:37

## 2022-02-18 RX ADMIN — DOCUSATE SODIUM 50MG AND SENNOSIDES 8.6MG 1 TABLET: 8.6; 5 TABLET, FILM COATED ORAL at 08:58

## 2022-02-18 RX ADMIN — LISINOPRIL 40 MG: 20 TABLET ORAL at 08:59

## 2022-02-18 RX ADMIN — CEFAZOLIN 1 G: 1 INJECTION, POWDER, FOR SOLUTION INTRAMUSCULAR; INTRAVENOUS at 09:00

## 2022-02-18 RX ADMIN — CEFAZOLIN 1 G: 1 INJECTION, POWDER, FOR SOLUTION INTRAMUSCULAR; INTRAVENOUS at 00:41

## 2022-02-18 RX ADMIN — OMEGA-3-ACID ETHYL ESTERS 1000 MG: 1 CAPSULE, LIQUID FILLED ORAL at 08:57

## 2022-02-18 RX ADMIN — DILTIAZEM HYDROCHLORIDE 240 MG: 120 CAPSULE, COATED, EXTENDED RELEASE ORAL at 08:58

## 2022-02-18 RX ADMIN — DULOXETINE HYDROCHLORIDE 30 MG: 30 CAPSULE, DELAYED RELEASE PELLETS ORAL at 08:58

## 2022-02-18 RX ADMIN — ACETAMINOPHEN 650 MG: 325 TABLET ORAL at 06:45

## 2022-02-18 RX ADMIN — METFORMIN HYDROCHLORIDE 1000 MG: 500 TABLET ORAL at 08:58

## 2022-02-18 RX ADMIN — OXYCODONE 5 MG: 5 TABLET ORAL at 01:25

## 2022-02-18 RX ADMIN — POLYETHYLENE GLYCOL 3350 17 G: 17 POWDER, FOR SOLUTION ORAL at 08:57

## 2022-02-18 RX ADMIN — ASPIRIN 81 MG: 81 TABLET, COATED ORAL at 08:59

## 2022-02-18 RX ADMIN — THERA TABS 1 TABLET: TAB at 09:00

## 2022-02-18 RX ADMIN — FAMOTIDINE 20 MG: 20 TABLET ORAL at 08:58

## 2022-02-18 RX ADMIN — CLOPIDOGREL BISULFATE 75 MG: 75 TABLET ORAL at 08:59

## 2022-02-18 RX ADMIN — CEFAZOLIN 1 G: 1 INJECTION, POWDER, FOR SOLUTION INTRAMUSCULAR; INTRAVENOUS at 08:57

## 2022-02-18 RX ADMIN — ACETAMINOPHEN 650 MG: 325 TABLET ORAL at 11:38

## 2022-02-18 RX ADMIN — INSULIN LISPRO 3 UNITS: 100 INJECTION, SOLUTION INTRAVENOUS; SUBCUTANEOUS at 08:57

## 2022-02-18 RX ADMIN — SODIUM CHLORIDE, PRESERVATIVE FREE 10 ML: 5 INJECTION INTRAVENOUS at 06:46

## 2022-02-18 RX ADMIN — HYDROCHLOROTHIAZIDE 25 MG: 25 TABLET ORAL at 08:59

## 2022-02-18 RX ADMIN — OXYCODONE 10 MG: 5 TABLET ORAL at 08:59

## 2022-02-18 RX ADMIN — Medication 2000 UNITS: at 08:58

## 2022-02-18 NOTE — PROGRESS NOTES
Problem: Mobility Impaired (Adult and Pediatric)  Goal: *Acute Goals and Plan of Care (Insert Text)  Description: FUNCTIONAL STATUS PRIOR TO ADMISSION: Patient was independent and active without use of DME.    HOME SUPPORT PRIOR TO ADMISSION: The patient lived with wife but did not require assist.    Physical Therapy Goals  Initiated 2/17/2022    1. Patient will move from supine to sit and sit to supine  in bed with modified independence within 4 days. 2. Patient will perform sit to stand with modified independence within 4 days. 3. Patient will ambulate with modified independence for 100 feet with the least restrictive device within 4 days. 4. Patient will ascend/descend 4 stairs with 1 handrail(s) with minimal assistance/contact guard assist within 4 days. 5. Patient will perform home exercise program per protocol with independence within 4 days. 6. Patient will demonstrate AROM 0-90 degrees in operative joint within 4 days. Outcome: Progressing Towards Goal   PHYSICAL THERAPY TREATMENT/DISCHARGE  Patient: James Mix (85 y.o. male)  Date: 2/18/2022  Diagnosis: Primary osteoarthritis of right knee [M17.11] <principal problem not specified>  Procedure(s) (LRB):  RIGHT TOTAL KNEE ARTHROPLASTY MAKOPLASTY (Right) 1 Day Post-Op  Precautions: WBAT,Fall (limit knee flexion to 90 degrees; use walker X6 weeks)  Chart, physical therapy assessment, plan of care and goals were reviewed. ASSESSMENT  Patient continues with skilled PT services and has progressed towards goals. No difficulties with right DF today. Performed transfer training, gait training in the room, hallway, and bathroom, and stair training. Reviewed car transfer. Wife present throughout treatment. Patient demonstrates good balance, basic mobility and ROM. He is cleared from a mobility standpoint for discharge home.     Other factors to consider for discharge: ordered a walker, has a cane at home         PLAN :  Patient is cleared for discharge by physical therapy at this time. Rationale for discharge:  Goals achieved    Recommendation for discharge: (in order for the patient to meet his/her long term goals)  Outpatient physical therapy follow up recommended for right knee    This discharge recommendation:  Has not yet been discussed the attending provider and/or case management    IF patient discharges home will need the following DME: rolling walker ordered       SUBJECTIVE:   Patient stated Radha Giang I go home today? [place]    OBJECTIVE DATA SUMMARY:   Critical Behavior:  Neurologic State: Alert  Orientation Level: Oriented X4     Safety/Judgement: Good awareness of safety precautions,Insight into deficits    Range of Motion:           RLE AROM  R Knee Flexion: 90  R Knee Extension: 6                Functional Mobility Training:  Bed Mobility:     Supine to Sit: Independent              Transfers:  Sit to Stand: Modified independent  Stand to Sit: Modified independent                             Balance:     Ambulation/Gait Training:  Distance (ft): 150 Feet (ft)  Assistive Device: Walker, rolling  Ambulation - Level of Assistance: Contact guard assistance;Assist x1;Additional time        Gait Abnormalities: Step to gait (able to step thru with cues)                                     Therapeutic Exercises:     EXERCISE   Sets   Reps   Active Active Assist   Passive Self ROM   Comments   Ankle Pumps   [x]                                           []                                           []                                           []                                              Quad Sets   [x]                                           []                                           []                                           []                                              Hamstring Sets   [x]                                           []                                           []                                           [] Short Arc Quads   [x]                                           []                                           []                                           []                                              Knee Extension Stretch     [x]                                             []                                             []                                             []                                              Heel Slides   [x]                                           []                                           []                                           []                                              Long Arc Quads   []                                           []                                           []                                           []                                              Knee Flexion Stretch   [x]                                           []                                           []                                           []                                              Straight Leg Raises   [x]                                           []                                           []                                           []                                                    Pain Rating:  \"it feels good. A little stiff\"    Activity Tolerance:   Good      After treatment patient left in no apparent distress:   Sitting in chair    COMMUNICATION/COLLABORATION:   The patients plan of care was discussed with: Occupational therapist and Registered nurse.      Mckayla Ziegler, PT   Time Calculation: 39 mins

## 2022-02-18 NOTE — PROGRESS NOTES
DAILY NOTE     ASSESSMENT / PLAN :   1. Disposition : Home discharge today. 2. Comments : doing well, return of full DF of the foot, home today. POD  1 Day Post-Op s/p Procedure(s):  RIGHT TOTAL KNEE ARTHROPLASTY MAKOPLASTY     SUBJECTIVE :     Concerns : None - doing well. OBJECTIVE :     Vitals:    02/18/22 0003 02/18/22 0347 02/18/22 0822 02/18/22 1112   BP: 126/72 (!) 149/80 136/66 110/65   Pulse: 83 76 79 75   Resp: 18 16 16 18   Temp: 97.5 °F (36.4 °C) 97.5 °F (36.4 °C) 97.5 °F (36.4 °C) 97.5 °F (36.4 °C)   SpO2: 96% 95% 96% 94%   Weight:       Height:           Alert and oriented x3. Dressing C/D/I  Sensation is intact to light touch. No calf pain. ANTICOAGULANTS / LABS :       Key Anti-Platelet Anticoagulant Meds             aspirin delayed-release 81 mg tablet (Taking) Take 1 Tablet by mouth two (2) times a day. clopidogreL (PLAVIX) 75 mg tab Take 1 Tablet by mouth daily.           Labs:  Recent Labs     02/18/22  0455   HGB 12.9      K 4.3      CO2 27   BUN 32*   CREA 1.41*   *        Patient mobility  Gait  Gait Abnormalities: Step to gait (able to step thru with cues)  Ambulation - Level of Assistance: Contact guard assistance,Assist x1,Additional time  Distance (ft): 150 Feet (ft)  Assistive Device: Walker, rolling  Rail Use: Left   Stairs - Level of Assistance: Contact guard assistance  Number of Stairs Trained: 1 Technology MD Nisha  Cell [phone]  Riya Real PA-C  Cell [phone]  Medical Assistants: [id number] [phone]                 Surgery Scheduler: KAYLEN Cohn [phone] ext [phone]

## 2022-02-18 NOTE — PROGRESS NOTES
Problem: Diabetes Self-Management  Goal: *Disease process and treatment process  Description: Define diabetes and identify own type of diabetes; list 3 options for treating diabetes. Outcome: Resolved/Met  Goal: *Incorporating nutritional management into lifestyle  Description: Describe effect of type, amount and timing of food on blood glucose; list 3 methods for planning meals. Outcome: Resolved/Met  Goal: *Incorporating physical activity into lifestyle  Description: State effect of exercise on blood glucose levels. Outcome: Resolved/Met  Goal: *Developing strategies to promote health/change behavior  Description: Define the ABC's of diabetes; identify appropriate screenings, schedule and personal plan for screenings. Outcome: Resolved/Met  Goal: *Using medications safely  Description: State effect of diabetes medications on diabetes; name diabetes medication taking, action and side effects. Outcome: Resolved/Met  Goal: *Monitoring blood glucose, interpreting and using results  Description: Identify recommended blood glucose targets  and personal targets. Outcome: Resolved/Met  Goal: *Prevention, detection, treatment of acute complications  Description: List symptoms of hyper- and hypoglycemia; describe how to treat low blood sugar and actions for lowering  high blood glucose level. Outcome: Resolved/Met  Goal: *Prevention, detection and treatment of chronic complications  Description: Define the natural course of diabetes and describe the relationship of blood glucose levels to long term complications of diabetes.   Outcome: Resolved/Met  Goal: *Developing strategies to address psychosocial issues  Description: Describe feelings about living with diabetes; identify support needed and support network  Outcome: Resolved/Met  Goal: *Insulin pump training  Outcome: Resolved/Met  Goal: *Sick day guidelines  Outcome: Resolved/Met  Goal: *Patient Specific Goal (EDIT GOAL, INSERT TEXT)  Outcome: Resolved/Met Problem: Patient Education: Go to Patient Education Activity  Goal: Patient/Family Education  Outcome: Resolved/Met     Problem: Falls - Risk of  Goal: *Absence of Falls  Description: Document Jocelin Don Fall Risk and appropriate interventions in the flowsheet.   Outcome: Resolved/Met  Note: Fall Risk Interventions:  Mobility Interventions: Patient to call before getting OOB,PT Consult for mobility concerns,PT Consult for assist device competence,Utilize walker, cane, or other assistive device,Utilize gait belt for transfers/ambulation         Medication Interventions: Bed/chair exit alarm,Patient to call before getting OOB,Teach patient to arise slowly,Utilize gait belt for transfers/ambulation    Elimination Interventions: Bed/chair exit alarm,Call light in reach,Patient to call for help with toileting needs,Urinal in reach              Problem: Patient Education: Go to Patient Education Activity  Goal: Patient/Family Education  Outcome: Resolved/Met     Problem: Patient Education: Go to Patient Education Activity  Goal: Patient/Family Education  Outcome: Resolved/Met     Problem: Hypertension  Goal: *Blood pressure within specified parameters  Outcome: Resolved/Met  Goal: *Fluid volume balance  Outcome: Resolved/Met  Goal: *Labs within defined limits  Outcome: Resolved/Met     Problem: Patient Education: Go to Patient Education Activity  Goal: Patient/Family Education  Outcome: Resolved/Met     Problem: Patient Education: Go to Patient Education Activity  Goal: Patient/Family Education  Outcome: Resolved/Met     Problem: Knee Replacement: Day of Surgery/Unit  Goal: Off Pathway (Use only if patient is Off Pathway)  Outcome: Resolved/Met  Goal: Activity/Safety  Outcome: Resolved/Met  Goal: Consults, if ordered  Outcome: Resolved/Met  Goal: Diagnostic Test/Procedures  Outcome: Resolved/Met  Goal: Nutrition/Diet  Outcome: Resolved/Met  Goal: Medications  Outcome: Resolved/Met  Goal: Respiratory  Outcome: Resolved/Met  Goal: Treatments/Interventions/Procedures  Outcome: Resolved/Met  Goal: Psychosocial  Outcome: Resolved/Met  Goal: *Initiate mobility  Outcome: Resolved/Met  Goal: *Optimal pain control at patient's stated goal  Outcome: Resolved/Met  Goal: *Hemodynamically stable  Outcome: Resolved/Met     Problem: Knee Replacement: Post-Op Day 1  Goal: Off Pathway (Use only if patient is Off Pathway)  Outcome: Resolved/Met  Goal: Activity/Safety  Outcome: Resolved/Met  Goal: Diagnostic Test/Procedures  Outcome: Resolved/Met  Goal: Nutrition/Diet  Outcome: Resolved/Met  Goal: Medications  Outcome: Resolved/Met  Goal: Respiratory  Outcome: Resolved/Met  Goal: Treatments/Interventions/Procedures  Outcome: Resolved/Met  Goal: Psychosocial  Outcome: Resolved/Met  Goal: Discharge Planning  Outcome: Resolved/Met  Goal: *Demonstrates progressive activity  Outcome: Resolved/Met  Goal: *Optimal pain control at patient's stated goal  Outcome: Resolved/Met  Goal: *Hemodynamically stable  Outcome: Resolved/Met  Goal: *Discharge plan identified  Outcome: Resolved/Met     Problem: Knee Replacement: Post-Op Day 2  Goal: Off Pathway (Use only if patient is Off Pathway)  Outcome: Resolved/Met  Goal: Activity/Safety  Outcome: Resolved/Met  Goal: Diagnostic Test/Procedures  Outcome: Resolved/Met  Goal: Medications  Outcome: Resolved/Met  Goal: Respiratory  Outcome: Resolved/Met  Goal: Treatments/Interventions/Procedures  Outcome: Resolved/Met  Goal: Psychosocial  Outcome: Resolved/Met  Goal: *Met physical therapy criteria for discharge to the next level of care  Outcome: Resolved/Met  Goal: *Optimal pain control with oral analgesia  Outcome: Resolved/Met  Goal: *Hemodynamically stable  Outcome: Resolved/Met  Goal: *Tolerating diet  Outcome: Resolved/Met  Goal: *Patient verbalizes understanding of discharge instructions  Outcome: Resolved/Met     Problem: Pain  Goal: *Control of Pain  Outcome: Resolved/Met  Goal: *PALLIATIVE CARE: Alleviation of Pain  Outcome: Resolved/Met     Problem: Patient Education: Go to Patient Education Activity  Goal: Patient/Family Education  Outcome: Resolved/Met

## 2022-02-18 NOTE — PROGRESS NOTES
Problem: Diabetes Self-Management  Goal: *Disease process and treatment process  Description: Define diabetes and identify own type of diabetes; list 3 options for treating diabetes. Outcome: Progressing Towards Goal  Goal: *Incorporating nutritional management into lifestyle  Description: Describe effect of type, amount and timing of food on blood glucose; list 3 methods for planning meals. Outcome: Progressing Towards Goal  Goal: *Incorporating physical activity into lifestyle  Description: State effect of exercise on blood glucose levels. Outcome: Progressing Towards Goal  Goal: *Developing strategies to promote health/change behavior  Description: Define the ABC's of diabetes; identify appropriate screenings, schedule and personal plan for screenings. Outcome: Progressing Towards Goal  Goal: *Using medications safely  Description: State effect of diabetes medications on diabetes; name diabetes medication taking, action and side effects. Outcome: Progressing Towards Goal  Goal: *Monitoring blood glucose, interpreting and using results  Description: Identify recommended blood glucose targets  and personal targets. Outcome: Progressing Towards Goal  Goal: *Prevention, detection, treatment of acute complications  Description: List symptoms of hyper- and hypoglycemia; describe how to treat low blood sugar and actions for lowering  high blood glucose level. Outcome: Progressing Towards Goal  Goal: *Prevention, detection and treatment of chronic complications  Description: Define the natural course of diabetes and describe the relationship of blood glucose levels to long term complications of diabetes.   Outcome: Progressing Towards Goal  Goal: *Developing strategies to address psychosocial issues  Description: Describe feelings about living with diabetes; identify support needed and support network  Outcome: Progressing Towards Goal  Goal: *Insulin pump training  Outcome: Progressing Towards Goal  Goal: *Sick day guidelines  Outcome: Progressing Towards Goal  Goal: *Patient Specific Goal (EDIT GOAL, INSERT TEXT)  Outcome: Progressing Towards Goal     Problem: Patient Education: Go to Patient Education Activity  Goal: Patient/Family Education  Outcome: Progressing Towards Goal     Problem: Falls - Risk of  Goal: *Absence of Falls  Description: Document Ilene Jaeger Fall Risk and appropriate interventions in the flowsheet.   Outcome: Progressing Towards Goal  Note: Fall Risk Interventions:  Mobility Interventions: Bed/chair exit alarm,Patient to call before getting OOB,PT Consult for mobility concerns,PT Consult for assist device competence,Utilize walker, cane, or other assistive device,Utilize gait belt for transfers/ambulation         Medication Interventions: Bed/chair exit alarm,Patient to call before getting OOB,Teach patient to arise slowly,Utilize gait belt for transfers/ambulation    Elimination Interventions: Bed/chair exit alarm,Call light in reach,Patient to call for help with toileting needs,Urinal in reach              Problem: Patient Education: Go to Patient Education Activity  Goal: Patient/Family Education  Outcome: Progressing Towards Goal     Problem: Patient Education: Go to Patient Education Activity  Goal: Patient/Family Education  Outcome: Progressing Towards Goal     Problem: Hypertension  Goal: *Blood pressure within specified parameters  Outcome: Progressing Towards Goal  Goal: *Fluid volume balance  Outcome: Progressing Towards Goal  Goal: *Labs within defined limits  Outcome: Progressing Towards Goal     Problem: Patient Education: Go to Patient Education Activity  Goal: Patient/Family Education  Outcome: Progressing Towards Goal     Problem: Patient Education: Go to Patient Education Activity  Goal: Patient/Family Education  Outcome: Progressing Towards Goal     Problem: Knee Replacement: Day of Surgery/Unit  Goal: Off Pathway (Use only if patient is Off Pathway)  Outcome: Progressing Towards Goal  Goal: Activity/Safety  Outcome: Progressing Towards Goal  Goal: Consults, if ordered  Outcome: Progressing Towards Goal  Goal: Diagnostic Test/Procedures  Outcome: Progressing Towards Goal  Goal: Nutrition/Diet  Outcome: Progressing Towards Goal  Goal: Medications  Outcome: Progressing Towards Goal  Goal: Respiratory  Outcome: Progressing Towards Goal  Goal: Treatments/Interventions/Procedures  Outcome: Progressing Towards Goal  Goal: Psychosocial  Outcome: Progressing Towards Goal  Goal: *Initiate mobility  Outcome: Progressing Towards Goal  Goal: *Optimal pain control at patient's stated goal  Outcome: Progressing Towards Goal  Goal: *Hemodynamically stable  Outcome: Progressing Towards Goal     Problem: Knee Replacement: Post-Op Day 1  Goal: Off Pathway (Use only if patient is Off Pathway)  Outcome: Progressing Towards Goal  Goal: Activity/Safety  Outcome: Progressing Towards Goal  Goal: Diagnostic Test/Procedures  Outcome: Progressing Towards Goal  Goal: Nutrition/Diet  Outcome: Progressing Towards Goal  Goal: Medications  Outcome: Progressing Towards Goal  Goal: Respiratory  Outcome: Progressing Towards Goal  Goal: Treatments/Interventions/Procedures  Outcome: Progressing Towards Goal  Goal: Psychosocial  Outcome: Progressing Towards Goal  Goal: Discharge Planning  Outcome: Progressing Towards Goal  Goal: *Demonstrates progressive activity  Outcome: Progressing Towards Goal  Goal: *Optimal pain control at patient's stated goal  Outcome: Progressing Towards Goal  Goal: *Hemodynamically stable  Outcome: Progressing Towards Goal  Goal: *Discharge plan identified  Outcome: Progressing Towards Goal     Problem: Knee Replacement: Post-Op Day 2  Goal: Off Pathway (Use only if patient is Off Pathway)  Outcome: Progressing Towards Goal  Goal: Activity/Safety  Outcome: Progressing Towards Goal  Goal: Diagnostic Test/Procedures  Outcome: Progressing Towards Goal  Goal: Medications  Outcome: Progressing Towards Goal  Goal: Respiratory  Outcome: Progressing Towards Goal  Goal: Treatments/Interventions/Procedures  Outcome: Progressing Towards Goal  Goal: Psychosocial  Outcome: Progressing Towards Goal  Goal: *Met physical therapy criteria for discharge to the next level of care  Outcome: Progressing Towards Goal  Goal: *Optimal pain control with oral analgesia  Outcome: Progressing Towards Goal  Goal: *Hemodynamically stable  Outcome: Progressing Towards Goal  Goal: *Tolerating diet  Outcome: Progressing Towards Goal  Goal: *Patient verbalizes understanding of discharge instructions  Outcome: Progressing Towards Goal     Problem: Pain  Goal: *Control of Pain  Outcome: Progressing Towards Goal  Goal: *PALLIATIVE CARE:  Alleviation of Pain  Outcome: Progressing Towards Goal     Problem: Patient Education: Go to Patient Education Activity  Goal: Patient/Family Education  Outcome: Progressing Towards Goal

## 2022-02-18 NOTE — PROGRESS NOTES
The patient was provided a virtual link to view the pre-operative Joint Replacement Class Video  A Patient Education Book specific to total hip or knee joint replacement surgery was given to the patient in [facility]. The content of the class was presented using an audio power point presentation specific for patients undergoing total hip and knee replacement surgery. Incentive spirometer and CHG bath kits were verbally reviewed. Day of surgery routine and expectations, hospital routine and expectations, nutrition, alcohol, nicotine, medications, infection control, pain management, DVT precautions and equipment, ice therapy, durable medical equipment, exercises, mobility expectations and precautions, home preparation and safety were reviewed in class video. My contact information was shared with the patient to provide further information as requested by the patient related to their upcoming surgery. Patient states confirmation that they viewed the joint class video prior to surgery.

## 2022-02-18 NOTE — PROGRESS NOTES
2/18/2022     11:31 AM  CM received acceptance from "[employer] for RW. RW delivered, and invoice attached in AllScripts. 9:40 AM  Care Management Assessment      Reason for Admission: Elective - Surgery required      ICD-10-CM ICD-9-CM    1. Arthritis of right knee  M17.11 716.96 pregabalin (Lyrica) 75 mg capsule      oxyCODONE IR (ROXICODONE) 5 mg immediate release tablet      traMADoL (ULTRAM) 50 mg tablet       Assessment:   [x]In person with pt   []Via p/c with pt   []With family member in person. Who/Relation:     []With family member via p/c. Who/Relation:   []Chart Review    RUR:  NA - ONS  Risk Level: []Low []Moderate []High  Value-based purchasing: [] Yes [] No  Bundle patient: [] Yes [] No   Specify:     Advance Directive: Full Code. [x] No AD on file. [] AD on file. [] Current AD not on file. Copy requested. [] Requests AD, and referral submitted to [facility]. Healthcare Decision Maker: Christy Barksdale - wife         Assessment:    Age: 55    Sex: [] Male [x]Female     Residency: [x]Private residence []Apartment []Assisted Living []LTC []Other:   Exterior Steps: 4  Interior Steps: 1 flight    Lives With: [x]With spouse []Other family members []Underage children []Alone []Care provider []Other:    Prior functioning:  [x]Independent with ADLs and iADLS []Dependent with ADLs and iADLs []Partial dependence, Specify:     Prior DME required:  [x]None []RW []Cane []Crutches []Bedside commode []CPAP []Home O2 (Liter/Provider: ) []Nebulizer   []Shower Chair []Wheelchair []Hospital Bed []Muriel []Stair lift []Rollator []Other:    DME available: []None []RW []Cane []Crutches []Bedside commode []CPAP []Home O2 (Liter/Provider: ) []Nebulizer   []Shower Chair []Wheelchair []Hospital Bed []Muriel []Stair lift []Rollator [x]Other: CM consult for RW noted. CM completed referral via AllScripts to [facility], and is awaiting response.      Rehab history: []None [x]Outpatient PT []Home Health (Provider/Date: ) []SNF (Provider/Date: ) []IPR (Provider/Date: ) []LTC (Provider/Date: ) []Hospice (Provider/Date: )  []Other:     Discharge Concerns: []Yes [x]No []Unknown   Describe:    Comments: Insurer:   Insurance Information                [employer] Phone: --    Subscriber: Lyndon Teresa Subscriber#: [id number]    Group#: [id number] Precert#: --          PCP: Halle Kerr   Address: [address] [place] [address] / [place] [address]   Phone number: [phone]   Current patient: [x]Yes []No   Approximate date of last visit: 12/13/2021   Access to virtual PCP visits: []Yes [x]No    Pharmacy:  [address] [place] [address] Transport: Family       Transition of care plan:    []Unable to determine at this time. Awaiting clinical progress, and disposition recommendations. [] Home with outpatient follow-up    [x] Home with Outpatient PT and outpatient follow-up   Pt aware of OP appt? [x]Yes, Provider: Ludwig Mcdaniel   []Not scheduled   Transport provider: Family    [] Home with family assistance as needed and outpatient follow-up   Family able to assist:    Schedule:  Transport provider:      [] Home with Home Health   - Provider:     []SNF/IPR   -[]Preferences given:   []Listing provided and preferences requested   -Status: []Pending []Accepted:    -Auth required: []Yes []No    -Auth initiated date:   -3 midnight stay required: []Yes []No  Date satisfied:     [] Home with Hospice   -Provider:     [] [facility] information provided. [] Other:     Idalmis Humphrey MA    Care Management Interventions  PCP Verified by CM: Yes (Charlie)  Mode of Transport at Discharge:  Other (see comment)  Transition of Care Consult (CM Consult): Discharge Planning  MyChart Signup: No  Discharge Durable Medical Equipment: Yes  Physical Therapy Consult: Yes  Occupational Therapy Consult: Yes  Speech Therapy Consult: No  Support Systems: Spouse/Significant Other  Confirm Follow Up Transport: Family  Discharge Location  Patient Expects to be Discharged to[de-identified] Home with outpatient services

## 2022-02-18 NOTE — PROGRESS NOTES
Removed IV. Reviewed discharge instructions with pt and pt wife. Reviewed discharge medications with pt and pt wife. Reviewed discharge follow up appointments with pt and pt wife. Pt verbalized understanding of all questions and signed electronically with this RN. Order placed for volunteer assistance.

## 2022-02-21 ENCOUNTER — TELEPHONE (OUTPATIENT)
Dept: SURGERY | Age: 57
End: 2022-02-21

## 2022-02-21 NOTE — TELEPHONE ENCOUNTER
Post Discharge Phone placed to patient after Joint Replacement surgery   Spoke with patient   Pain medications patient is taking tylenol, taking 1 -2 oxycodone during the day   States pain is tolerable and pain medication is effective.    Patient was able to fill prescriptions, no medication questions    Discharge medications reviewed  States discharge instructions were clear and easy to understand has no questions  Patient is using a walker without difficulty  Walking hourly while awake  Able to do exercises regularly  Education provided  Bruising is  moderate  Swelling is  moderate  Patient is elevating leg when resting  Using ice with good relief  Education r/t positioning provided  States dressing is intact with old drainage, unchanged since arriving home  Removal education provided  Denies N/V, appetite is good  Constipation is none, BM  No Difficulty urinating  Education provided  Follow up appointment is scheduled with surgeon   PT is scheduled tomorrow   Denies fever, cough  Denies chest pain, back pain, difficulty taking a deep breath or SOB  Denies any unusual symptoms  Discussed calling surgeon or PCP for concerns  Reminded patient to fill out survey  Opportunity given for patient to ask questions  Call duration 4:45 minutes

## 2022-02-22 ENCOUNTER — TELEPHONE (OUTPATIENT)
Dept: CARDIOLOGY CLINIC | Age: 57
End: 2022-02-22

## 2022-02-22 NOTE — TELEPHONE ENCOUNTER
He did not read his Vastari message, please call him with below message        Your kidney function looks a little better with creatinine 1.3.  Your triglycerides are high at 349 and your LDL is near goal at 74.  Please continue to decrease your carbohydrate intake and exercise as tolerated for your elevated triglyceride levels.  Please continue to work on improving your blood sugar control to help your triglycerides as well.  All other labs looked ok.        Future Appointments   Date Time Provider Boby Nickerson   4/28/2022 10:00 AM VASCULAR, TRANG ZUÑIGA BS AMB   4/28/2022 11:00 AM MD ZARA Glass BS AMB   [id number]/07/0960  5:73 AM Edy Norwood MD [facility] BS AMB

## 2022-02-23 NOTE — TELEPHONE ENCOUNTER
TC to pt, ID verified. Advised per AVE Roman of his labwork. No furthre questions, states he is working on all the things listed.

## 2022-03-08 RX ORDER — ICOSAPENT ETHYL 1000 MG/1
2 CAPSULE ORAL 2 TIMES DAILY WITH MEALS
Qty: 120 CAPSULE | Refills: 2 | Status: SHIPPED | OUTPATIENT
Start: 2022-03-08 | End: 2022-04-27

## 2022-03-08 NOTE — TELEPHONE ENCOUNTER
Cardiologist: Dr. Lamont Serra    Last appt: 1/28/2022  Future Appointments   Date Time Provider Boby Prescotti   4/28/2022 10:00 AM TRANG GLASER BS AMB   4/28/2022 11:00 AM MD ZARA Sanchez BS AMB   67/86/0089  6:16 AM Sheng Guerra MD [facility] BS AMB       Requested Prescriptions     Signed Prescriptions Disp Refills    Vascepa 1 gram capsule 120 Capsule 2     Sig: Take 2 Capsules by mouth two (2) times daily (with meals). Authorizing Provider: Marva Niño     Ordering User: VIVEK SHEPPARD VO per Dr. Lamont Serra.

## 2022-03-18 PROBLEM — D22.9 MULTIPLE NEVI: Status: ACTIVE | Noted: 2021-12-13

## 2022-03-19 PROBLEM — E11.21 TYPE 2 DIABETES WITH NEPHROPATHY (HCC): Status: ACTIVE | Noted: 2018-04-18

## 2022-03-19 PROBLEM — I25.119 ATHEROSCLEROTIC HEART DISEASE OF NATIVE CORONARY ARTERY WITH UNSPECIFIED ANGINA PECTORIS (HCC): Status: ACTIVE | Noted: 2021-11-30

## 2022-03-19 PROBLEM — M19.90 ARTHRITIS: Status: ACTIVE | Noted: 2019-11-13

## 2022-03-19 PROBLEM — M17.11 PRIMARY OSTEOARTHRITIS OF RIGHT KNEE: Status: ACTIVE | Noted: 2022-02-17

## 2022-03-19 PROBLEM — Z00.00 WELL ADULT EXAM: Status: ACTIVE | Noted: 2021-12-13

## 2022-03-19 PROBLEM — I25.2 HISTORY OF MI (MYOCARDIAL INFARCTION): Status: ACTIVE | Noted: 2019-11-21

## 2022-03-20 PROBLEM — Z79.4 TYPE 2 DIABETES MELLITUS WITH BOTH EYES AFFECTED BY MODERATE NONPROLIFERATIVE RETINOPATHY WITHOUT MACULAR EDEMA, WITH LONG-TERM CURRENT USE OF INSULIN (HCC): Status: ACTIVE | Noted: 2020-09-28

## 2022-03-20 PROBLEM — E11.3393 TYPE 2 DIABETES MELLITUS WITH BOTH EYES AFFECTED BY MODERATE NONPROLIFERATIVE RETINOPATHY WITHOUT MACULAR EDEMA, WITH LONG-TERM CURRENT USE OF INSULIN (HCC): Status: ACTIVE | Noted: 2020-09-28

## 2022-03-20 PROBLEM — N18.30 CKD (CHRONIC KIDNEY DISEASE) STAGE 3, GFR 30-59 ML/MIN (HCC): Status: ACTIVE | Noted: 2018-04-18

## 2022-03-20 PROBLEM — I20.9 ANGINA PECTORIS, UNSPECIFIED (HCC): Status: ACTIVE | Noted: 2021-11-30

## 2022-04-27 RX ORDER — ICOSAPENT ETHYL 1000 MG/1
CAPSULE ORAL
Qty: 360 CAPSULE | Refills: 1 | Status: SHIPPED | OUTPATIENT
Start: 2022-04-27

## 2022-04-27 NOTE — TELEPHONE ENCOUNTER
Cardiologist: Dr. Ian Dove    Last appt: 1/28/2022  Future Appointments   Date Time Provider Boby Liya   4/28/2022 10:00 AM TRANG GLASER BS AMB   4/28/2022 11:00 AM MD ZARA Khoury BS AMB   38/71/2245  5:65 AM Joel Guerra MD [facility] BS AMB       Requested Prescriptions     Signed Prescriptions Disp Refills    Vascepa 1 gram capsule 360 Capsule 1     Sig: TAKE 2 CAPSULES BY MOUTH TWICE DAILY WITH MEALS     Authorizing Provider: Tiarra Lam     Ordering User: VIVEK SHEPPARD         Refills VO per Dr. Ian Dove.

## 2022-04-28 ENCOUNTER — OFFICE VISIT (OUTPATIENT)
Dept: CARDIOLOGY CLINIC | Age: 57
End: 2022-04-28

## 2022-04-28 ENCOUNTER — ANCILLARY PROCEDURE (OUTPATIENT)
Dept: CARDIOLOGY CLINIC | Age: 57
End: 2022-04-28
Payer: COMMERCIAL

## 2022-04-28 VITALS
DIASTOLIC BLOOD PRESSURE: 70 MMHG | OXYGEN SATURATION: 95 % | BODY MASS INDEX: 38.94 KG/M2 | WEIGHT: 272 LBS | HEIGHT: 70 IN | HEART RATE: 76 BPM | SYSTOLIC BLOOD PRESSURE: 112 MMHG | RESPIRATION RATE: 16 BRPM

## 2022-04-28 DIAGNOSIS — I10 HTN (HYPERTENSION), BENIGN: Primary | ICD-10-CM

## 2022-04-28 DIAGNOSIS — I25.10 CORONARY ARTERY DISEASE INVOLVING NATIVE CORONARY ARTERY OF NATIVE HEART WITHOUT ANGINA PECTORIS: ICD-10-CM

## 2022-04-28 DIAGNOSIS — I48.0 PAROXYSMAL ATRIAL FIBRILLATION (HCC): ICD-10-CM

## 2022-04-28 DIAGNOSIS — R09.89 BRUIT OF LEFT CAROTID ARTERY: ICD-10-CM

## 2022-04-28 DIAGNOSIS — E78.2 MIXED HYPERLIPIDEMIA: ICD-10-CM

## 2022-04-28 LAB
LEFT CCA DIST DIAS: 10.9 CM/S
LEFT CCA DIST SYS: 82.7 CM/S
LEFT CCA PROX DIAS: 7.3 CM/S
LEFT CCA PROX SYS: 93.5 CM/S
LEFT ECA DIAS: 5.34 CM/S
LEFT ECA SYS: 70.1 CM/S
LEFT ICA DIST DIAS: 17 CM/S
LEFT ICA DIST SYS: 84.1 CM/S
LEFT ICA MID DIAS: 15.6 CM/S
LEFT ICA MID SYS: 65 CM/S
LEFT ICA PROX DIAS: 10.5 CM/S
LEFT ICA PROX SYS: 55.2 CM/S
LEFT ICA/CCA SYS: 0.9 NO UNITS
LEFT VERTEBRAL DIAS: 9.54 CM/S
LEFT VERTEBRAL SYS: 37.5 CM/S
RIGHT CCA DIST DIAS: 15 CM/S
RIGHT CCA DIST SYS: 89.2 CM/S
RIGHT CCA PROX DIAS: 10.8 CM/S
RIGHT CCA PROX SYS: 81.6 CM/S
RIGHT ECA DIAS: 5.28 CM/S
RIGHT ECA SYS: 65.1 CM/S
RIGHT ICA DIST DIAS: 21.4 CM/S
RIGHT ICA DIST SYS: 77.8 CM/S
RIGHT ICA MID DIAS: 22.2 CM/S
RIGHT ICA MID SYS: 85 CM/S
RIGHT ICA PROX DIAS: 12.5 CM/S
RIGHT ICA PROX SYS: 54.5 CM/S
RIGHT ICA/CCA SYS: 1 NO UNITS
RIGHT VERTEBRAL DIAS: 7.54 CM/S
RIGHT VERTEBRAL SYS: 29.2 CM/S

## 2022-04-28 PROCEDURE — 99214 OFFICE O/P EST MOD 30 MIN: CPT | Performed by: SPECIALIST

## 2022-04-28 PROCEDURE — 93880 EXTRACRANIAL BILAT STUDY: CPT | Performed by: SPECIALIST

## 2022-04-28 RX ORDER — FLASH GLUCOSE SENSOR
KIT MISCELLANEOUS
COMMUNITY
Start: 2022-04-26

## 2022-04-28 RX ORDER — SUB-Q INSULIN DEVICE, 40 UNIT
EACH MISCELLANEOUS
COMMUNITY
Start: 2022-04-05

## 2022-04-28 RX ORDER — INSULIN LISPRO 100 [IU]/ML
INJECTION, SOLUTION INTRAVENOUS; SUBCUTANEOUS
COMMUNITY
Start: 2022-02-28

## 2022-04-28 NOTE — PROGRESS NOTES
Visit Vitals  /70   Pulse 76   Resp 16   Ht 5' 10\" (1.778 m)   Wt 272 lb (123.4 kg)   SpO2 95%   BMI 39.03 kg/m²

## 2022-04-28 NOTE — PATIENT INSTRUCTIONS
Please STOP Plavix    Please CONTINUE Aspirin 162mg daily    Follow up with Dr Shima Auguste in 6 months

## 2022-07-27 DIAGNOSIS — I10 HTN (HYPERTENSION), BENIGN: ICD-10-CM

## 2022-07-27 DIAGNOSIS — E78.2 MIXED HYPERLIPIDEMIA: ICD-10-CM

## 2022-07-27 DIAGNOSIS — I48.0 PAROXYSMAL ATRIAL FIBRILLATION (HCC): ICD-10-CM

## 2022-07-28 RX ORDER — NEBIVOLOL 10 MG/1
TABLET ORAL
Qty: 90 TABLET | Refills: 0 | OUTPATIENT
Start: 2022-07-28

## 2022-07-28 RX ORDER — DILTIAZEM HYDROCHLORIDE 240 MG/1
CAPSULE, COATED, EXTENDED RELEASE ORAL
Qty: 90 CAPSULE | Refills: 3 | Status: SHIPPED | OUTPATIENT
Start: 2022-07-28

## 2022-07-28 RX ORDER — FOSINOPRIL SODIUM 40 MG/1
TABLET ORAL
Qty: 90 TABLET | Refills: 3 | Status: SHIPPED | OUTPATIENT
Start: 2022-07-28

## 2022-07-28 RX ORDER — ROSUVASTATIN CALCIUM 40 MG/1
40 TABLET, COATED ORAL
Qty: 90 TABLET | Refills: 3 | Status: SHIPPED | OUTPATIENT
Start: 2022-07-28

## 2022-07-28 RX ORDER — HYDROCHLOROTHIAZIDE 25 MG/1
TABLET ORAL
Qty: 90 TABLET | Refills: 3 | Status: SHIPPED | OUTPATIENT
Start: 2022-07-28

## 2022-11-03 ENCOUNTER — OFFICE VISIT (OUTPATIENT)
Dept: CARDIOLOGY CLINIC | Age: 57
End: 2022-11-03
Payer: COMMERCIAL

## 2022-11-03 VITALS
SYSTOLIC BLOOD PRESSURE: 120 MMHG | OXYGEN SATURATION: 98 % | RESPIRATION RATE: 16 BRPM | BODY MASS INDEX: 39.08 KG/M2 | HEART RATE: 81 BPM | WEIGHT: 273 LBS | DIASTOLIC BLOOD PRESSURE: 70 MMHG | HEIGHT: 70 IN

## 2022-11-03 DIAGNOSIS — I35.0 NONRHEUMATIC AORTIC VALVE STENOSIS: ICD-10-CM

## 2022-11-03 DIAGNOSIS — I48.0 PAROXYSMAL ATRIAL FIBRILLATION (HCC): Primary | ICD-10-CM

## 2022-11-03 DIAGNOSIS — I35.0 AORTIC VALVE STENOSIS, UNSPECIFIED ETIOLOGY: ICD-10-CM

## 2022-11-03 PROCEDURE — 93000 ELECTROCARDIOGRAM COMPLETE: CPT | Performed by: SPECIALIST

## 2022-11-03 PROCEDURE — 3078F DIAST BP <80 MM HG: CPT | Performed by: SPECIALIST

## 2022-11-03 PROCEDURE — 99214 OFFICE O/P EST MOD 30 MIN: CPT | Performed by: SPECIALIST

## 2022-11-03 PROCEDURE — 3074F SYST BP LT 130 MM HG: CPT | Performed by: SPECIALIST

## 2022-11-03 NOTE — PROGRESS NOTES
Visit Vitals  /70   Pulse 81   Resp 16   Ht 5' 10\" (1.778 m)   Wt 273 lb (123.8 kg)   SpO2 98%   BMI 39.17 kg/m²

## 2022-11-03 NOTE — PROGRESS NOTES
[address] [facility]                                                            OFFICE NOTE        Vivian Saeed M.D.,LUDWIG Josh Sandra   1965  [id number]    Date/Time:  11/3/220672:22 AM            SUBJECTIVE:  he is doing very well he has had no chest pain or shortness of breath palpitation presyncopal syncopal episodes. He remains active. He will retire in the next week or so. After 30 years as a . Assessment/Plan  1. CAD: He remains completely asymptomatic and active. His electrocardiogram reveals a normal sinus rhythm low voltage QRS and inferior Q waves in unclear significance. He has undergone cardiac catheterization in December 2021 with some residual stenosis in the LAD and RCA but not significant off to warrant intervention at this time. Specifically does have a significant restenosis of the very distal LAD which was felt not really amenable to revascularization given likelihood of restenosis in the very distal segments. He also has a 30 to 40% stenosis mid OM 2 and very distal RCA with 70% focal stenosis. Medical therapy will be continued for now and is completely asymptomatic patient with no significant EKG changes. He will alert me if any symptoms should occur    It has been a long time since her last PCI discussed options concerning Plavix. Now off Plavix continue aspirin 162 mg daily. Continue Crestor and diltiazem at this time. 2.  Paroxysmal atrial fibrillation: He remains in normal sinus rhythm. Status post atrial fibrillation ablation in September 2015. Currently off Eliquis. Continue aspirin alone for now. 3.  Aortic stenosis: Mild to moderate by last transthoracic echocardiogram repeat in 6 months     4. Carotid artery stenosis: Less than 50% bilateral in April 2022.5. Hypertension: Well controlled.      6.  Hyperlipidemia: Well-controlled as far as cholesterol continue same dose of Crestor. History glycerides remain elevated continue Gely will defer to his primary care physician addressing diabetes which is I suspect the main responsible for the increase in triglycerides. LDL goal less than 70     We have discussed nutrition and weight loss. He is obesity is moderate at least.     7.  Obstructive of sleep apnea: Continue CPAP. 8.  Diabetes: Closely followed by his primary care physician. otherwise see him back in 6 months or sooner if any question problems arise prior to that    The stress test on the back burner for now. HPI   65 y.o. male. history of HTN , HLP, CAD/MI in 2008. S/p PCI LAD in 2008 and additional stent in LAD in 2011. He also has residual 70% RV branch stenosis. Admitted to [facility] with AF and RVR on 7/15 nuclear stress test at that time with no ischemia, apical attenuation ( sub endo mi) and EF 54%. Echo also on 7/15: EF 55-60% no rwma,  bicuspid AV and mild AS     Admitted to [facility] on 7/15 for recurrent AF. DOLORES showed no NEWTON, normal LV and tri leaflet AV with possible partial fusion of 2 leaflets, mild AS, AI and MR . He failed cardioversion with persistent AF   successful dc cardioversion on 7/15 while on multaq     Recurrent AF afterwards on 7/15  and eventually AF ablation with Dr. Eddie Liu on 9/15     Stopped toprol on his own accord on 11/15 on account of excessive sob     He has GB disease and stones 2015  Echo on 8/16:Left ventricle: Size was normal. Systolic function was normal by visual  assessment. Ejection fraction was estimated to be 60 %. There were no  regional wall motion abnormalities. Wall thickness was mildly increased. Aortic valve: There was an apparent echogenic mass of tissue appearing in  the outflow tract adherent to the right coronary cusp; unknown if  clinically significant.  Leaflets exhibited moderately increased thickness,  moderate calcification, and mildly reduced cuspal separation. Transaortic  velocity was increased due to valvular stenosis. There was mild stenosis. VIRA was 2.0 cm2, peak/mean gradients were 29/17 mmHg, and the DI was 0.45. There was mild regurgitation. Aorta, systemic arteries: The root exhibited normal size; however, the  ascending aorta was mildly enlarged; sinus of valsalva was 3.8 cm, the  ST-J was 3.5 cm, and the ascending aorta was 4.0 cm. ER visit for cp on 5/17 normal w/u but elevated BP     NUCLEAR STRESS TEST on 6/2/17 no gross ischemia fixed apical and infero apical defect EF 60%      ECHO on 5/17: EF 60% mild AS mean gradient of 14 mmhg mild CO              CARDIAC STUDIES        11/23/21    ECHO ADULT COMPLETE 11/24/2021 11/24/2021    Interpretation Summary  · LV: Estimated LVEF is 60 - 65%. Visually measured ejection fraction. Normal cavity size and systolic function (ejection fraction normal). Moderate concentric hypertrophy. Mild (grade 1) left ventricular diastolic dysfunction. · LA: Mildly dilated left atrium. · AV: Probably trileaflet aortic valve. Moderate aortic valve leaflet calcification present with reduced excursion. Aortic valve mean gradient is 16 mmHg. Aortic valve area is 1.4 cm2. Mild to moderate aortic valve stenosis is present. Mild to moderate aortic valve regurgitation is present. · Image quality for this study was technically difficult. · Contrast used: DEFINITY. Signed by: Altagracia Vázquez MD on 11/24/2021  1:56 PM            11/23/21    NUCLEAR CARDIAC STRESS TEST 11/23/2021 11/24/2021    Interpretation Summary  · SPECT: Left ventricular function post-stress was normal. Calculated ejection fraction is 63% (normal EF value is equal to or greater than 50%). Left ventricular wall motion was normal at stress, no regional wall motion abnormality noted. There is no evidence of transient ischemic dilation (TID). The TID ratio is 1.06.  · Baseline ECG: Normal sinus rhythm, rsr.  · SPECT: Myocardial perfusion imaging defect 1: There is a defect that is small to medium in size present in the apical segment(s) of the anterior wall(s) that is partially reversible. There is normal wall motion in the defect area. Viability in the area is good. The defect appears to probably be ischemia. · SPECT: Left ventricular perfusion is abnormal. Myocardial perfusion imaging supports an intermediate risk stress test.  · Mixed infarction and ischemia in a small to medium area of distal anterior wall and apex  · EF preserved at 63%    Signed by: Bobby Duke MD on 11/23/2021  4:58 PM            12/10/21    CARDIAC PROCEDURE 12/11/2021 12/11/2021    Conclusion  · Relatively stable mild to moderate disease of a calcified proximal LAD compared to cardiac catheterization in 2013.   · Mid into distal LAD with a long segment of previously placed stent that shows mild to moderate in-stent restenosis with severe in-stent restenosis at the very apical aspect of previously placed stent  · Focal 70% stenosis in distal right coronary artery  · Normal LVEDP    Access: Right radial artery, 6F  Catheters:  Left coronary: JL 3.5, 5F  Right coronary: JR4, 5F    Findings:  L Main: Large-caliber, minimal disease  LAD: Large caliber vessel, calcified, proximal into mid vessel with diffuse mild to moderate disease with mild progression compared to cardiac catheterization in 2013, mid into the distal LAD with a long segment of previously placed stent showing diffuse mild to moderate in-stent restenosis with the apical segment showing severe in-stent restenosis with DARIUS-3 flow into apical LAD, high diagonal shows severe diffuse disease with significant progression compared to prior cardiac catheterization films  LCx: Large caliber vessel, calcified, mild disease through the proximal end AV groove circumflex, very small caliber OM1, large caliber OM 2 with a mid vessel focal 30 to 40% stenosis, subsequently with distal bifurcation with the superior branch with previous coronary intervention demonstrating diffuse mild to moderate disease and inferior branch demonstrating severe diffuse disease with a proximal 80% stenosis  RCA: Large-caliber vessel, calcified, diffuse mild disease throughout the vessel with a focal 70% stenosis by QCA within the distal right coronary artery, small to moderate PDA and small to moderate posterior lateral branch with diffuse moderate disease    LVEDP:  8-10 mmhg      Plan:  Proximal LAD and showed minimal progression in disease. Abnormality on stress test likely due to in-stent restenosis of the very distal aspect of the LAD. Recommend deferring any intervention to the apical LAD due to lack of vessel runoff will predispose patient to recurrent restenosis. Recommend deferring IFR guided revascularization of the distal right coronary artery due to lack of chest pain symptoms and the fact patient can be undergoing knee surgery in the near future. I would defer revascularization of the distal right coronary artery unless patient develops anginal chest pain symptoms. Signed by: Christian Vaughan DO on 12/11/2021  2:09 PM          EKG Results       Procedure 720 Value Units Date/Time    AMB POC EKG ROUTINE W/ 12 LEADS, INTER & REP [[id number]] Resulted: 11/03/22 1057    Order Status: Completed Updated: 11/03/22 1058                IMAGING      MRI Results (most recent):  Results from Hospital Encounter encounter on 08/25/15    MRA CHEST W CONT    Narrative  **Final Report**      ICD Codes / Adm. Diagnosis: 427.31  401.1 / Atrial fibrillation [facility])  Examination:  MR CHEST MRA W CON  - [id number] - Aug 25 2015 12:05PM  Accession No:  [id number]  Reason:  preop      REPORT:    CARDIOVASCULAR MRI    INDICATION: preop    PROCEDURAL DATA:    CPT Codes: 19623    SCANS PERFORMED:  Spin Echo: Axial.  Pulmonary Vein Angiogram    Contrast Agent: Magnevist.  Contrast Dose: 40ml.     CLINICAL DATA:  HR = 74/min, BP = 135/91mmHg, HT = 5 feet 10, WT = 274lb. Impression  :    1. All pulmonary veins visualized draining the left atrium. There is no  accessory pulmonary veins. There is no sinus venosus defect. The left upper  and lower pulmonary vein drain close to each other without significant  separation. The esophagus courses behind the left atrium close to the os of  the left upper and lower pulmonary veins. The individual pulmonary vein  ostial dimensions are as follows:    Right upper pulmonary vein: 23 x 21 mm. Right lower pulmonary vein: 28 x 21 mm. Left upper pulmonary vein: 19 x 18 mm. Left lower pulmonary vein: 25 x 19 mm.  2. These is no left atrial appendage thrombus. 3. Normal origin of the great vessels of the aortic arch. Normal ascending  aorta and descending thoracic aorta without aneurysm or dissection. Signing/Reading Doctor: Yunier Chen ([id number])  Janis Bonilla ([id number])  Aug 25 2015  4:22PM      CT Results (most recent):  Results from Hospital Encounter encounter on 02/03/22    CT LOW EXT RT WO CONT    Narrative  INTERPRETATION PROVIDED FOR COMPLIANCE ONLY AT NO CHARGE  The CT scan was ordered for presurgical planning without specific request for  radiologic interpretation. Moderate to severe right knee osteoarthritis with large joint effusion and  popliteal cyst.. No fracture or aggressive bone lesion. Atherosclerosis. Imaging sent to the  for surgical planning. XR Results (most recent):  Results from Hospital Encounter encounter on 02/17/22    XR KNEE RT MAX 2 VWS    Narrative  EXAM: XR KNEE RT MAX 2 VWS    INDICATION: post-op film. COMPARISON: None. FINDINGS: AP and crosstable lateral views of the right knee were obtained. There  has been recent placement of a knee prosthesis. Impression  Recent placement of a right knee prosthesis.           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    Nares    ZAK on CPAP     Type 2 diabetes mellitus (HCC)      Past Surgical History:   Procedure Laterality Date    HX AFIB ABLATION      HX AMPUTATION FINGER Left 1986    Ring finger    HX ANGIOPLASTY      HX HEART CATHETERIZATION Left 12/10/2021    HX ORTHOPAEDIC  2007    L Knee Arthroscopy    [place] CARDIAC SURG PROCEDURE UNLIST      3 stents placed 2009,2011     Social History     Tobacco Use    Smoking status: Never    Smokeless tobacco: Never   Substance Use Topics    Alcohol use: No     Alcohol/week: 0.0 standard drinks    Drug use: No     Family History   Problem Relation Age of Onset    OSTEOARTHRITIS Mother     Diabetes Mother     Elevated Lipids Mother     Heart Disease Mother     Hypertension Mother     Elevated Lipids Father     Heart Disease Father     Hypertension Father     Cancer Father         skin and angiosarcoma    Diabetes Maternal Grandmother     Hypertension Brother     Diabetes Brother     Hypertension Brother     Diabetes Brother     Hypertension Brother      Allergies   Allergen Reactions    Cleocin [Clindamycin Hcl] Rash         Visit Vitals  /70   Pulse 81   Resp 16   Ht 5' 10\" (1.778 m)   Wt 273 lb (123.8 kg)   SpO2 98%   BMI 39.17 kg/m²         Last 3 Recorded Weights in this Encounter    11/03/22 1042   Weight: 273 lb (123.8 kg)            Review of Systems:   Pertinent items are noted in the History of Present Illness. Visit Vitals  /70   Pulse 81   Resp 16   Ht 5' 10\" (1.778 m)   Wt 273 lb (123.8 kg)   SpO2 98%   BMI 39.17 kg/m²     General Appearance:  Well developed, well nourished,alert and oriented x 3, and individual in no acute distress. Ears/Nose/Mouth/Throat:   Hearing grossly normal.         Neck: Supple.    Chest:   Lungs clear to auscultation bilaterally. Cardiovascular:  Regular rate and rhythm, S1, S2 normal, no murmur. Abdomen:   Soft, non-tender, bowel sounds are active. Extremities: No edema bilaterally. Skin: Warm and dry. Current Outpatient Medications on File Prior to Visit   Medication Sig Dispense Refill    dilTIAZem ER (CARDIZEM CD) 240 mg capsule Take 1 capsule by mouth once daily 90 Capsule 3    fosinopriL (MONOPRIL) 40 mg tablet Take 1 tablet by mouth once daily 90 Tablet 3    hydroCHLOROthiazide (HYDRODIURIL) 25 mg tablet Take 1 tablet by mouth once daily 90 Tablet 3    rosuvastatin (CRESTOR) 40 mg tablet Take 1 tablet by mouth nightly 90 Tablet 3    V-GO 40 vinicio AS DIRECTED UNDER THE SKIN ONCE A DAY      HumaLOG U-100 Insulin 100 unit/mL injection INJECT 56 UNITS UNDER THE SKIN ONCE A DAY AS DIRECTED      FreeStyle Almita 14 Day Sensor kit       Vascepa 1 gram capsule TAKE 2 CAPSULES BY MOUTH TWICE DAILY WITH MEALS 360 Capsule 1    aspirin delayed-release 81 mg tablet Take 1 Tablet by mouth two (2) times a day. 60 Tablet 0    pregabalin (Lyrica) 75 mg capsule Take 1 Capsule by mouth daily (with dinner). Max Daily Amount: 75 mg. 30 Capsule 0    sub-q insulin device, 30 unit (V-GO 30) vinicio by SubCUTAneous route. 30U cont 24hrs/d  Sliding scale w/meals      pantoprazole (PROTONIX) 40 mg tablet Take 40 mg by mouth nightly. metFORMIN (GLUCOPHAGE) 1,000 mg tablet Take 1,000 mg by mouth daily. Vitamin D3 50 mcg (2,000 unit) cap capsule Take 1 capsule by mouth once daily 90 Cap 3    DULoxetine (CYMBALTA) 30 mg capsule Take 1 Cap by mouth daily. miSOPROStoL (CYTOTEC) 200 mcg tablet Take 1 Tablet by mouth nightly. semaglutide (OZEMPIC SC) by SubCUTAneous route every seven (7) days. JARDIANCE 25 mg tablet Take 25 mg by mouth daily. ONETOUCH ULTRA TEST strip       multivitamin (ONE A DAY) tablet Take 1 Tablet by mouth daily.         acetaminophen (Acetaminophen Pain Relief) 500 mg tablet Take 2 Tablets by mouth every six (6) hours as needed for Pain. 60 Tablet 1    nitroglycerin (NITROSTAT) 0.4 mg SL tablet 1 Tablet by SubLINGual route every five (5) minutes as needed for Chest Pain for up to 3 doses. (Patient not taking: No sig reported) 25 Each 1     No current facility-administered medications on file prior to visit. Je Angulo. Tanja Darius had no medications administered during this visit. Current Outpatient Medications   Medication Sig    dilTIAZem ER (CARDIZEM CD) 240 mg capsule Take 1 capsule by mouth once daily    fosinopriL (MONOPRIL) 40 mg tablet Take 1 tablet by mouth once daily    hydroCHLOROthiazide (HYDRODIURIL) 25 mg tablet Take 1 tablet by mouth once daily    rosuvastatin (CRESTOR) 40 mg tablet Take 1 tablet by mouth nightly    V-GO 40 vinicio AS DIRECTED UNDER THE SKIN ONCE A DAY    HumaLOG U-100 Insulin 100 unit/mL injection INJECT 56 UNITS UNDER THE SKIN ONCE A DAY AS DIRECTED    FreeStyle Almita 14 Day Sensor kit     Vascepa 1 gram capsule TAKE 2 CAPSULES BY MOUTH TWICE DAILY WITH MEALS    aspirin delayed-release 81 mg tablet Take 1 Tablet by mouth two (2) times a day. pregabalin (Lyrica) 75 mg capsule Take 1 Capsule by mouth daily (with dinner). Max Daily Amount: 75 mg.    sub-q insulin device, 30 unit (V-GO 30) vinicio by SubCUTAneous route. 30U cont 24hrs/d  Sliding scale w/meals    pantoprazole (PROTONIX) 40 mg tablet Take 40 mg by mouth nightly. metFORMIN (GLUCOPHAGE) 1,000 mg tablet Take 1,000 mg by mouth daily. Vitamin D3 50 mcg (2,000 unit) cap capsule Take 1 capsule by mouth once daily    DULoxetine (CYMBALTA) 30 mg capsule Take 1 Cap by mouth daily. miSOPROStoL (CYTOTEC) 200 mcg tablet Take 1 Tablet by mouth nightly. semaglutide (OZEMPIC SC) by SubCUTAneous route every seven (7) days. JARDIANCE 25 mg tablet Take 25 mg by mouth daily. ONETOUCH ULTRA TEST strip     multivitamin (ONE A DAY) tablet Take 1 Tablet by mouth daily.       acetaminophen (Acetaminophen Pain Relief) 500 mg tablet Take 2 Tablets by mouth every six (6) hours as needed for Pain. nitroglycerin (NITROSTAT) 0.4 mg SL tablet 1 Tablet by SubLINGual route every five (5) minutes as needed for Chest Pain for up to 3 doses. (Patient not taking: No sig reported)     No current facility-administered medications for this visit. Lab Results   Component Value Date/Time    Cholesterol, total 170 02/07/2022 08:44 AM    HDL Cholesterol 40 02/07/2022 08:44 AM    LDL,Direct 80 07/10/2015 04:44 AM    LDL, calculated 74 02/07/2022 08:44 AM    LDL, calculated 50 09/11/2019 08:50 AM    VLDL, calculated 56 (H) 02/07/2022 08:44 AM    VLDL, calculated 53 (H) 09/11/2019 08:50 AM    Triglyceride 349 (H) 02/07/2022 08:44 AM    CHOL/HDL Ratio 5.6 (H) 07/10/2015 04:44 AM       Lab Results   Component Value Date/Time    Sodium 138 02/18/2022 04:55 AM    Potassium 4.3 02/18/2022 04:55 AM    Chloride 104 02/18/2022 04:55 AM    CO2 27 02/18/2022 04:55 AM    Anion gap 7 02/18/2022 04:55 AM    Glucose 210 (H) 02/18/2022 04:55 AM    BUN 32 (H) 02/18/2022 04:55 AM    Creatinine 1.41 (H) 02/18/2022 04:55 AM    BUN/Creatinine ratio 23 (H) 02/18/2022 04:55 AM    GFR est AA >60 02/18/2022 04:55 AM    GFR est non-AA 52 (L) 02/18/2022 04:55 AM    Calcium 8.6 02/18/2022 04:55 AM       Lab Results   Component Value Date/Time    ALT (SGPT) 36 02/07/2022 09:30 AM    Alk.  phosphatase 65 02/07/2022 09:30 AM    Bilirubin, direct 0.14 09/11/2019 08:50 AM    Bilirubin, total 0.6 02/07/2022 09:30 AM       Lab Results   Component Value Date/Time    WBC 7.0 02/07/2022 09:30 AM    Hemoglobin (POC) 14.6 01/24/2010 02:15 AM    HGB 12.9 02/18/2022 04:55 AM    Hematocrit (POC) 43 01/24/2010 02:15 AM    HCT 49.9 02/07/2022 09:30 AM    PLATELET 388 28/05/3934 09:30 AM    MCV 88.2 02/07/2022 09:30 AM       Lab Results   Component Value Date/Time    TSH 0.94 07/09/2015 03:15 AM         Lab Results   Component Value Date/Time    Cholesterol, total 170 02/07/2022 08:44 AM    Cholesterol, total 139 09/11/2019 08:50 AM    Cholesterol, total 195 01/29/2019 08:56 AM    Cholesterol, total 151 09/15/2017 08:56 AM    Cholesterol, total 208 (H) 04/24/2017 08:34 AM    HDL Cholesterol 40 02/07/2022 08:44 AM    HDL Cholesterol 36 (L) 09/11/2019 08:50 AM    HDL Cholesterol 36 (L) 01/29/2019 08:56 AM    HDL Cholesterol 39 (L) 09/15/2017 08:56 AM    HDL Cholesterol 49 04/24/2017 08:34 AM    LDL,Direct 80 07/10/2015 04:44 AM    LDL, calculated 74 02/07/2022 08:44 AM    LDL, calculated 50 09/11/2019 08:50 AM    LDL, calculated Comment 01/29/2019 08:56 AM    LDL, calculated 58 09/15/2017 08:56 AM    LDL, calculated 95 04/24/2017 08:34 AM    LDL, calculated 66 02/03/2016 09:40 AM    Triglyceride 349 (H) 02/07/2022 08:44 AM    Triglyceride 266 (H) 09/11/2019 08:50 AM    Triglyceride 442 (H) 01/29/2019 08:56 AM    Triglyceride 271 (H) 09/15/2017 08:56 AM    Triglyceride 321 (H) 04/24/2017 08:34 AM    CHOL/HDL Ratio 5.6 (H) 07/10/2015 04:44 AM                Please note that this dictation was completed with [employer] voice recognition software. Quite often unanticipated grammatical, syntax, homophones, and other interpretative errors are inadvertently transcribed by the computer software. Please disregard these errors. Please excuse any errors that have escaped final proofreading.

## 2022-12-14 ENCOUNTER — OFFICE VISIT (OUTPATIENT)
Dept: INTERNAL MEDICINE CLINIC | Age: 57
End: 2022-12-14
Payer: COMMERCIAL

## 2022-12-14 VITALS
BODY MASS INDEX: 38.97 KG/M2 | RESPIRATION RATE: 14 BRPM | OXYGEN SATURATION: 94 % | TEMPERATURE: 97.4 F | HEIGHT: 70 IN | WEIGHT: 272.2 LBS | SYSTOLIC BLOOD PRESSURE: 122 MMHG | HEART RATE: 81 BPM | DIASTOLIC BLOOD PRESSURE: 68 MMHG

## 2022-12-14 DIAGNOSIS — Z00.00 WELL ADULT EXAM: Primary | ICD-10-CM

## 2022-12-14 DIAGNOSIS — E11.3393 TYPE 2 DIABETES MELLITUS WITH BOTH EYES AFFECTED BY MODERATE NONPROLIFERATIVE RETINOPATHY WITHOUT MACULAR EDEMA, WITH LONG-TERM CURRENT USE OF INSULIN (HCC): ICD-10-CM

## 2022-12-14 DIAGNOSIS — K21.9 GASTROESOPHAGEAL REFLUX DISEASE WITHOUT ESOPHAGITIS: ICD-10-CM

## 2022-12-14 DIAGNOSIS — D22.9 MULTIPLE NEVI: ICD-10-CM

## 2022-12-14 DIAGNOSIS — M25.532 LEFT WRIST PAIN: ICD-10-CM

## 2022-12-14 DIAGNOSIS — I10 HTN (HYPERTENSION), BENIGN: ICD-10-CM

## 2022-12-14 DIAGNOSIS — I25.10 CORONARY ARTERY DISEASE INVOLVING NATIVE CORONARY ARTERY OF NATIVE HEART WITHOUT ANGINA PECTORIS: ICD-10-CM

## 2022-12-14 DIAGNOSIS — N18.30 STAGE 3 CHRONIC KIDNEY DISEASE, UNSPECIFIED WHETHER STAGE 3A OR 3B CKD (HCC): ICD-10-CM

## 2022-12-14 DIAGNOSIS — E78.2 MIXED HYPERLIPIDEMIA: ICD-10-CM

## 2022-12-14 DIAGNOSIS — Z79.4 TYPE 2 DIABETES MELLITUS WITH BOTH EYES AFFECTED BY MODERATE NONPROLIFERATIVE RETINOPATHY WITHOUT MACULAR EDEMA, WITH LONG-TERM CURRENT USE OF INSULIN (HCC): ICD-10-CM

## 2022-12-14 DIAGNOSIS — I48.0 PAROXYSMAL ATRIAL FIBRILLATION (HCC): ICD-10-CM

## 2022-12-14 PROCEDURE — 99396 PREV VISIT EST AGE 40-64: CPT | Performed by: INTERNAL MEDICINE

## 2022-12-14 NOTE — ASSESSMENT & PLAN NOTE
monitored by specialist. No acute findings meriting change in the plan   3/28/22 VEI  a1c 7.7   Cr 1.32  Microalb and foot exam checked by endo per patient

## 2022-12-14 NOTE — ASSESSMENT & PLAN NOTE
Left wrist pain  Feels a knot, hurts with bending   Went to ortho on call    Result Date: 8/8/2022  Shielding: Shielded. PA, Lat, Oblique. Impression: Three views of the left wrist were obtained in the office today and reveal advanced degenerative arthritis at the [facility] joint. No acute findings.      He would like to follow-up with Ortho

## 2022-12-14 NOTE — ASSESSMENT & PLAN NOTE
well controlled, continue current medications   Diltiazem to 40 ER daily, lisinopril 40, hydrochlorothiazide 25

## 2022-12-14 NOTE — PROGRESS NOTES
Assessment and Plan     1. Well adult exam  Assessment & Plan:   Was called by GI - due for jos, he will plan to schedule  Encourage healthy habits  Orders:  -     OTHER; PCV20 pneumonia vaccine, Print, Disp-1 Units, R-0  2. 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 without macular edema, with long-term current use of insulin (HCC)  Assessment & Plan:   monitored by specialist. No acute findings meriting change in the plan   3/28/22 VEI  a1c 7.7   Cr 1.32  Microalb and foot exam checked by endo per patient  3. Paroxysmal atrial fibrillation (HCC)  Assessment & Plan:   monitored by specialist. No acute findings meriting change in the plan  4. Coronary artery disease involving native coronary artery of native heart without angina pectoris  Assessment & Plan:   monitored by specialist. No acute findings meriting change in the plan   On aspirin, rosuvastatin  5. Stage 3 chronic kidney disease, unspecified whether stage 3a or 3b CKD ([facility]  Assessment & Plan:   3/28/22 VEI  a1c 7.7   Cr 1.32  Stable, monitor. No longer followed by nephrology  6. Gastroesophageal reflux disease without esophagitis  Assessment & Plan:   well controlled, continue current medications   Pantoprazole 40  7. HTN (hypertension), benign  Assessment & Plan:   well controlled, continue current medications   Diltiazem to 40 ER daily, lisinopril 40, hydrochlorothiazide 25  8. Mixed hyperlipidemia  Assessment & Plan:   well controlled, continue current medications   Rosuvastatin 40  9. Left wrist pain  Assessment & Plan:   Left wrist pain  Feels a knot, hurts with bending   Went to ortho on call    Result Date: 8/8/2022  Shielding: Shielded. PA, Lat, Oblique. Impression: Three views of the left wrist were obtained in the office today and reveal advanced degenerative arthritis at the [facility] joint. No acute findings. He would like to follow-up with Ortho  10.  Multiple nevi  Assessment & Plan: monitored by specialist. No acute findings meriting change in the plan     Benefits, risks, possible drug interactions, and side effects of all new medications were reviewed with the patient. Pt verbalized understanding. 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 Jenise Hoyos MD  [facility]  12/14/2022    Future Appointments   Date Time Provider Boby Nickerson   5/3/2023 10:00 AM TRANG GUTIERREZ AMB   5/10/2023 10:00 AM MD ZARA Adames BS AMB        History of Present Illness   Chief Complaint   physical    Glenda Burt is a 65 y.o. male         Review of Systems   Constitutional:  Negative for chills and fever. HENT:  Negative for hearing loss. Eyes:  Negative for blurred vision. Respiratory:  Negative for shortness of breath. Cardiovascular:  Negative for chest pain. Gastrointestinal:  Negative for abdominal pain, blood in stool, constipation, diarrhea, melena, nausea and vomiting. Genitourinary:  Negative for dysuria and hematuria. Musculoskeletal:  Positive for joint pain. Skin:  Negative for rash. Neurological:  Negative for headaches.       Past Medical History     Allergies   Allergen Reactions    Cleocin [Clindamycin Hcl] Rash        Current Outpatient Medications   Medication Sig    OTHER PCV20 pneumonia vaccine    dilTIAZem ER (CARDIZEM CD) 240 mg capsule Take 1 capsule by mouth once daily    fosinopriL (MONOPRIL) 40 mg tablet Take 1 tablet by mouth once daily    hydroCHLOROthiazide (HYDRODIURIL) 25 mg tablet Take 1 tablet by mouth once daily    rosuvastatin (CRESTOR) 40 mg tablet Take 1 tablet by mouth nightly    V-GO 40 vinicio AS DIRECTED UNDER THE SKIN ONCE A DAY    HumaLOG U-100 Insulin 100 unit/mL injection INJECT 56 UNITS UNDER THE SKIN ONCE A DAY AS DIRECTED    FreeStyle Almita 14 Day Sensor kit     Vascepa 1 gram capsule TAKE 2 CAPSULES BY MOUTH TWICE DAILY WITH MEALS    aspirin delayed-release 81 mg tablet Take 1 Tablet by mouth two (2) times a day. pregabalin (Lyrica) 75 mg capsule Take 1 Capsule by mouth daily (with dinner). Max Daily Amount: 75 mg.    sub-q insulin device, 30 unit (V-GO 30) vinicio by SubCUTAneous route. 30U cont 24hrs/d  Sliding scale w/meals    pantoprazole (PROTONIX) 40 mg tablet Take 40 mg by mouth nightly. metFORMIN (GLUCOPHAGE) 1,000 mg tablet Take 1,000 mg by mouth daily. Vitamin D3 50 mcg (2,000 unit) cap capsule Take 1 capsule by mouth once daily    DULoxetine (CYMBALTA) 30 mg capsule Take 1 Cap by mouth daily. miSOPROStoL (CYTOTEC) 200 mcg tablet Take 1 Tablet by mouth nightly. semaglutide (OZEMPIC SC) 2 mg by SubCUTAneous route every seven (7) days. JARDIANCE 25 mg tablet Take 25 mg by mouth daily. ONETOUCH ULTRA TEST strip     multivitamin (ONE A DAY) tablet Take 1 Tablet by mouth daily. acetaminophen (Acetaminophen Pain Relief) 500 mg tablet Take 2 Tablets by mouth every six (6) hours as needed for Pain. No current facility-administered medications for this visit.           Patient Active Problem List   Diagnosis Code    Type 2 diabetes mellitus with stage 3 chronic kidney disease, with long-term current use of insulin [facility] E11.22, N18.30, Z79.4    HTN (hypertension), benign I10    Mixed hyperlipidemia E78.2    Coronary artery disease involving native coronary artery of native heart without angina pectoris I25.10    Gastroesophageal reflux disease without esophagitis K21.9    Class 2 severe obesity due to excess calories with serious comorbidity and body mass index (BMI) of 35.0 to 35.9 in adult (HCC) E66.01, Z68.35    ZAK (obstructive sleep apnea) G47.33    Colon polyp K63.5    Paroxysmal atrial fibrillation ([facility] I48.0    Other male erectile dysfunction N52.8    Hypogonadism in male E29.1    S/P ablation of atrial fibrillation Z98.890, Z86.79    Cholelithiasis without cholecystitis K80.20    Gastroparesis K31.84    Type 2 diabetes with nephropathy ([facility]) E11.21    CKD (chronic kidney disease) stage 3, GFR 30-59 ml/min ([facility]) N18.30    Arthritis M19.90    History of MI (myocardial infarction) I25.2    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 without macular edema, with long-term current use of insulin ([facility]) K49.1561, Z79.4    Angina pectoris, unspecified I20.9    Multiple nevi D22.9    Well adult exam Z00.00    Primary osteoarthritis of right knee M17.11    Left wrist pain M25.532     Past Surgical History:   Procedure Laterality Date    HX AFIB ABLATION      HX AMPUTATION FINGER Left 1986    Ring finger    HX ANGIOPLASTY      HX HEART CATHETERIZATION Left 12/10/2021    HX ORTHOPAEDIC  2007    L Knee Arthroscopy    HX ORTHOPAEDIC Right 02/2022    knee replacement    MI CARDIAC SURG PROCEDURE UNLIST      3 stents placed 2009,2011      Social History     Tobacco Use    Smoking status: Never    Smokeless tobacco: Never   Substance Use Topics    Alcohol use: No     Alcohol/week: 0.0 standard drinks      Family History   Problem Relation Age of Onset    OSTEOARTHRITIS Mother     Diabetes Mother     Elevated Lipids Mother     Heart Disease Mother     Hypertension Mother     Elevated Lipids Father     Heart Disease Father     Hypertension Father     Cancer Father         skin and angiosarcoma    Diabetes Maternal Grandmother     Hypertension Brother     Diabetes Brother     Hypertension Brother     Diabetes Brother     Hypertension Brother         Physical Exam   Vitals:       Visit Vitals  /68   Pulse 81   Temp 97.4 °F (36.3 °C) (Oral)   Resp 14   Ht 5' 10\" (1.778 m)   Wt 272 lb 3.2 oz (123.5 kg)   SpO2 94%   BMI 39.06 kg/m²        Physical Exam  Constitutional:       General: He is not in acute distress. Appearance: He is well-developed. HENT:      Right Ear: Ear canal and external ear normal. There is impacted cerumen. Left Ear: Ear canal and external ear normal. There is impacted cerumen. Eyes:      Extraocular Movements: Extraocular movements intact. Conjunctiva/sclera: Conjunctivae normal.   Neck:      Vascular: No carotid bruit. Cardiovascular:      Rate and Rhythm: Normal rate and regular rhythm. Pulses: Normal pulses. Heart sounds: No murmur heard. No friction rub. No gallop. Pulmonary:      Effort: No respiratory distress. Breath sounds: No wheezing, rhonchi or rales. Abdominal:      General: Bowel sounds are normal. There is no distension. Palpations: Abdomen is soft. There is no hepatomegaly, splenomegaly or mass. Tenderness: There is no abdominal tenderness. There is no guarding. Musculoskeletal:      Cervical back: Neck supple. Right lower leg: No edema. Left lower leg: No edema. Comments: Left lateral wrist swelling noted   Lymphadenopathy:      Cervical: No cervical adenopathy. Skin:     General: Skin is warm. Findings: No rash. Neurological:      Mental Status: He is alert.

## 2022-12-15 RX ORDER — ICOSAPENT ETHYL 1000 MG/1
CAPSULE ORAL
Qty: 360 CAPSULE | Refills: 1 | Status: SHIPPED | OUTPATIENT
Start: 2022-12-15

## 2022-12-15 NOTE — TELEPHONE ENCOUNTER
Requested Prescriptions     Signed Prescriptions Disp Refills    Vascepa 1 gram capsule 360 Capsule 1     Sig: TAKE 2 CAPSULES BY MOUTH TWICE DAILY WITH MEALS     Authorizing Provider: Heather Lefort     Ordering User: Wilfredo Pickett      Per VO of Dr. Allie Bullock   Date Time Provider Boby Nickerson   5/3/2023 10:00 AM TRANG GUTIERREZ AMB   5/10/2023 10:00 AM MD ZARA Tidwell BS [facility]  11-3-22

## 2022-12-26 NOTE — TELEPHONE ENCOUNTER
[facility]        NAME: Anthony Way is a 33 y o  female  : 1990    MRN: [id number]  DATE: 2022  TIME: 9:43 AM    Assessment and Plan   Contact with and (suspected) exposure to covid-19 [Z20 822]  1  Contact with and (suspected) exposure to covid-19  Cov/Flu-Collected at [facility] or [facility]      2  Rash  predniSONE 20 mg tablet            Patient Instructions     Mucinex OTC BID prn  tykenol OTC prn for aches and pain  Follow up with PCP in 3-5 days  Proceed to  ER if symptoms worsen  Chief Complaint     Chief Complaint   Patient presents with   • Headache   • Cold Like Symptoms   • Cough   • Generalized Body Aches   • Sore Throat   • Rash     Patient been sick since Saturday with her symptoms- she states that she has a rash all over her body         History of Present Illness       HPI   Presents to the clinic for c/o rash on the trunk  Duration x 4 days  Cold symptoms started 3 days ago  healthcare worker  States her  is positive for covid 19, via home testing  She did not do a home test; says he job requires a PCR test      Review of Systems   Review of Systems   Constitutional: Negative for fever  HENT: Positive for congestion, rhinorrhea and sore throat  Respiratory: Positive for cough  Cardiovascular: Negative for chest pain  Gastrointestinal: Negative for diarrhea, nausea and vomiting  Skin: Positive for rash (all over my body)  Neurological: Positive for headaches           Current Medications       Current Outpatient Medications:   •  predniSONE 20 mg tablet, Take 1 tablet (20 mg total) by mouth 2 (two) times a day with meals for 5 days, Disp: 10 tablet, Rfl: 0  •  cetirizine (ZyrTEC) 10 mg tablet, Take 10 mg by mouth daily as needed , Disp: , Rfl:   •  fluticasone (FLONASE) 50 mcg/act nasal spray, 1 spray into each nostril daily, Disp: 16 mL, Rfl: 4  •  meclizine (ANTIVERT) 25 mg tablet, Take 1 tablet (25 mg total) by mouth 3 (three) times a Requested Prescriptions     Signed Prescriptions Disp Refills    icosapent ethyL (Vascepa) 1 gram capsule 360 Cap 0     Sig: Take 2 Caps by mouth two (2) times daily (with meals). Authorizing Provider: Pat Officer     Ordering User: [facility]     Verbal order per Dr. Chloe Walden.     Future Appointments   Date Time Provider Boby Liya   10/16/2020  1:20 PM MD ZARA Sommers BS AMB   43/11/0092  0:23 AM Neil Grullon MD [facility] BS AMB day as needed for dizziness, Disp: 30 tablet, Rfl: 0  •  metFORMIN (GLUCOPHAGE-XR) 500 mg 24 hr tablet, Take 1 tablet (500 mg total) by mouth 2 (two) times a day with meals, Disp: 180 tablet, Rfl: 3  •  Multiple Vitamins-Minerals (MULTIVITAL-M PO), Take by mouth daily, Disp: , Rfl:   •  norethindrone-ethinyl estradiol (Loestrin Fe 1/20) 1-20 MG-MCG per tablet, Take 1 tablet by mouth daily, Disp: 84 tablet, Rfl: 3  •  omeprazole (PriLOSEC) 40 MG capsule, Take 20 mg by mouth daily , Disp: , Rfl:     Current Allergies     Allergies as of 12/26/2022 - Reviewed 12/26/2022   Allergen Reaction Noted   • Shrimp extract allergy skin test - food allergy Hives 06/12/2019   • Other Other (See Comments) 09/01/2021            The following portions of the patient's history were reviewed and updated as appropriate: allergies, current medications, past family history, past medical history, past social history, past surgical history and problem list      Past Medical History:   Diagnosis Date   • Abnormal Pap smear of cervix    • Allergic rhinitis    • Bartholin cyst    • GERD (gastroesophageal reflux disease)    • HPV in female    • Obesity    • PCOS (polycystic ovarian syndrome) 11/2019   • Polycystic ovary syndrome    • Varicella        Past Surgical History:   Procedure Laterality Date   • EXAMINATION UNDER ANESTHESIA N/A 9/18/2020    Procedure: EXAM UNDER ANESTHESIA (EUA);   Surgeon: Damien Wilkinson MD;  Location: [facility];  Service: Gynecology   • AK MARSUP BARTHOLIN GLAND CYST N/A 9/18/2020    Procedure: Nicasio Monday CYST;  Surgeon: Damien Wilkinson MD;  Location: [facility];  Service: Gynecology   • WISDOM TOOTH EXTRACTION         Family History   Problem Relation Age of Onset   • Drug abuse Mother    • Hypertension Father    • Diabetes Father    • Asthma Father    • Gestational diabetes Sister    • Asthma Sister    • No Known Problems Sister    • No Known Problems Sister    • No Known Problems Brother • No Known Problems Brother    • Coronary artery disease Family    • Diabetes Family    • Diabetes Maternal Grandmother    • Heart failure Maternal Grandmother    • Diabetes Paternal Grandfather    • Heart failure Paternal Grandfather    • Diabetes Paternal Grandmother    • Colon cancer Neg Hx    • Ovarian cancer Neg Hx    • Breast cancer Neg Hx          Medications have been verified  Objective   BP (!) 178/86 (BP Location: Right arm, Patient Position: Sitting, Cuff Size: Standard)   Pulse 88   Temp 97 8 °F (36 6 °C)   Resp 18   LMP 12/26/2022   Patient's last menstrual period was 12/26/2022  Physical Exam     Physical Exam  Constitutional:       Appearance: She is ill-appearing  HENT:      Right Ear: Tympanic membrane normal       Left Ear: Tympanic membrane normal       Nose: Congestion and rhinorrhea present  Mouth/Throat:      Pharynx: No posterior oropharyngeal erythema  Tonsils: No tonsillar exudate  0 on the right  0 on the left  Comments: Post nasal drip  Cardiovascular:      Rate and Rhythm: Regular rhythm  Pulmonary:      Effort: Pulmonary effort is normal       Breath sounds: Normal breath sounds

## 2023-01-10 NOTE — TELEPHONE ENCOUNTER
Have early 1 hr glucose test done  Consider aspirin (see info below)  Consider AFP (see info below)  Schedule 20 week \"level 2\" ultrasound with MFM - see info below. AFP Level   There is an optional blood test that we can perform on you called \"AFP level. \" It is a chemical in the bloodstream produced by the pregnancy. It is done between 15-20 weeks gestation. The ideal time for testing is actually 16-18 weeks gestation. If the level is abnormally elevated, this can indicate the presence of abnormalities of the development of the brain and spinal cord such as anencephaly (lack of brain development) and spina bifida (exposed spinal cord). These anomalies can generally be seen on ultrasound. It can also be elevated in cases of normal fetal anatomy and can indicate an abnormal placenta. This may or may not be able to be detected on ultrasound. Please think about whether or not you would like to have this test done. When you decide when you would like to have this test done, please let us know. We must enter your exact gestational age and weight on the date of the blood draw for the test to be calculated accurately. Maternal Fetal Medicine (MFM)  Bao Henry   [facility]  [address], [address]   Ph [address] [facility]. [place], [address] [facility] [address]  Ph [phone]     Aspirin in pregnancy  -81 mg tablet - take 1.5 or 2 tablets per day.  Start at 12-13 weeks   -may help prevent  pre-eclampsia by helping with placental development and function  -may discontinue at term (37 wk) Requested Prescriptions     Signed Prescriptions Disp Refills    nebivolol (BYSTOLIC) 10 mg tablet 90 Tab 1     Sig: Take 1 Tab by mouth daily. Authorizing Provider: Adri Arredondo     Ordering User: Celena Garza     Verbal order per Dr. Berna Luna.     Future Appointments   Date Time Provider Boby Liya   3/19/2020  9:00 AM Reyes Greaser, MD [facility] SCH[facility]   38/90/3173  7:09 AM Philip Cooks, MD [address] [address]

## 2023-05-03 ENCOUNTER — ANCILLARY PROCEDURE (OUTPATIENT)
Dept: CARDIOLOGY CLINIC | Age: 58
End: 2023-05-03

## 2023-05-03 VITALS — BODY MASS INDEX: 39.03 KG/M2 | WEIGHT: 272 LBS

## 2023-05-03 DIAGNOSIS — I35.0 NONRHEUMATIC AORTIC VALVE STENOSIS: ICD-10-CM

## 2023-05-03 LAB
ECHO AO ASC DIAM: 3.9 CM
ECHO AO ROOT DIAM: 3.8 CM
ECHO AR MAX VEL PISA: 3.4 M/S
ECHO AV AREA PEAK VELOCITY: 1.8 CM2
ECHO AV AREA VTI: 2 CM2
ECHO AV MEAN GRADIENT: 12 MMHG
ECHO AV MEAN VELOCITY: 1.7 M/S
ECHO AV PEAK GRADIENT: 19 MMHG
ECHO AV PEAK VELOCITY: 2.2 M/S
ECHO AV REGURGITANT PHT: 375.7 MILLISECOND
ECHO AV VELOCITY RATIO: 0.45
ECHO AV VTI: 36.6 CM
ECHO LA DIAMETER: 4.4 CM
ECHO LA TO AORTIC ROOT RATIO: 1.16
ECHO LV E' LATERAL VELOCITY: 3 CM/S
ECHO LV E' SEPTAL VELOCITY: 6 CM/S
ECHO LV FRACTIONAL SHORTENING: 40 % (ref 28–44)
ECHO LV INTERNAL DIMENSION DIASTOLIC: 4.7 CM (ref 4.2–5.9)
ECHO LV INTERNAL DIMENSION SYSTOLIC: 2.8 CM
ECHO LV IVSD: 1.3 CM (ref 0.6–1)
ECHO LV MASS 2D: 199.6 G (ref 88–224)
ECHO LV POSTERIOR WALL DIASTOLIC: 1 CM (ref 0.6–1)
ECHO LV RELATIVE WALL THICKNESS RATIO: 0.43
ECHO LVOT AREA: 4.2 CM2
ECHO LVOT AV VTI INDEX: 0.51
ECHO LVOT DIAM: 2.3 CM
ECHO LVOT MEAN GRADIENT: 2 MMHG
ECHO LVOT PEAK GRADIENT: 4 MMHG
ECHO LVOT PEAK VELOCITY: 1 M/S
ECHO LVOT SV: 76.8 ML
ECHO LVOT VTI: 18.5 CM
ECHO MV A VELOCITY: 0.41 M/S
ECHO MV E DECELERATION TIME (DT): 197.1 MS
ECHO MV E VELOCITY: 0.96 M/S
ECHO MV E/A RATIO: 2.34
ECHO MV E/E' LATERAL: 32
ECHO MV E/E' RATIO (AVERAGED): 24
ECHO MV E/E' SEPTAL: 16

## 2023-05-10 ENCOUNTER — OFFICE VISIT (OUTPATIENT)
Age: 58
End: 2023-05-10

## 2023-05-10 VITALS
HEIGHT: 70 IN | OXYGEN SATURATION: 99 % | SYSTOLIC BLOOD PRESSURE: 114 MMHG | WEIGHT: 272 LBS | RESPIRATION RATE: 16 BRPM | DIASTOLIC BLOOD PRESSURE: 80 MMHG | HEART RATE: 82 BPM | BODY MASS INDEX: 38.94 KG/M2

## 2023-05-10 DIAGNOSIS — I48.0 PAROXYSMAL ATRIAL FIBRILLATION (HCC): Primary | ICD-10-CM

## 2023-05-10 DIAGNOSIS — I25.10 CORONARY ARTERY DISEASE INVOLVING NATIVE CORONARY ARTERY OF NATIVE HEART WITHOUT ANGINA PECTORIS: ICD-10-CM

## 2023-05-10 DIAGNOSIS — I10 HTN (HYPERTENSION), BENIGN: ICD-10-CM

## 2023-05-10 RX ORDER — DILTIAZEM HYDROCHLORIDE 240 MG/1
240 CAPSULE, COATED, EXTENDED RELEASE ORAL DAILY
COMMUNITY
Start: 2023-03-14

## 2023-05-10 RX ORDER — FLASH GLUCOSE SENSOR
KIT MISCELLANEOUS
COMMUNITY
Start: 2023-05-02

## 2023-05-10 RX ORDER — SEMAGLUTIDE 2.68 MG/ML
INJECTION, SOLUTION SUBCUTANEOUS
COMMUNITY
Start: 2023-03-02

## 2023-05-10 RX ORDER — INSULIN ASPART 100 [IU]/ML
INJECTION, SOLUTION INTRAVENOUS; SUBCUTANEOUS
COMMUNITY
Start: 2023-04-05

## 2023-05-10 NOTE — PROGRESS NOTES
dilTIAZem (CARDIZEM CD) 240 MG extended release capsule Take 1 capsule by mouth daily      Continuous Blood Gluc Sensor (FREESTYLE DARCY 14 DAY SENSOR) MISC USE AS DIRECTED EVERY 14 DAYS      acetaminophen (TYLENOL) 500 MG tablet Take by mouth every 6 hours as needed      aspirin 81 MG EC tablet Take by mouth 2 times daily      Cholecalciferol 50 MCG (2000 UT) CAPS Take 1 capsule by mouth once daily      DULoxetine (CYMBALTA) 30 MG extended release capsule Take 1 capsule by mouth daily      empagliflozin (JARDIANCE) 25 MG tablet Take by mouth daily      fosinopril (MONOPRIL) 40 MG tablet Take 1 tablet by mouth once daily      hydroCHLOROthiazide (HYDRODIURIL) 25 MG tablet Take 1 tablet by mouth once daily      Icosapent Ethyl (VASCEPA) 1 g CAPS capsule TAKE 2 CAPSULES BY MOUTH TWICE DAILY WITH MEALS      insulin lispro (HUMALOG) 100 UNIT/ML SOLN injection vial INJECT 56 UNITS UNDER THE SKIN ONCE A DAY AS DIRECTED      metFORMIN (GLUCOPHAGE) 1000 MG tablet Take by mouth daily      miSOPROStol (CYTOTEC) 200 MCG tablet Take 1 tablet by mouth      nebivolol (BYSTOLIC) 10 MG tablet Take 1 tablet by mouth once daily      pantoprazole (PROTONIX) 40 MG tablet Take by mouth      rosuvastatin (CRESTOR) 40 MG tablet Take by mouth      dilTIAZem (TIAZAC) 240 MG extended release capsule Take 1 capsule by mouth once daily (Patient not taking: Reported on 5/10/2023)      pregabalin (LYRICA) 75 MG capsule Take by mouth Daily with supper. (Patient not taking: Reported on 5/10/2023)       No current facility-administered medications for this visit.               Lab Results   Component Value Date/Time    CHOL 170 02/07/2022 08:44 AM    HDL 40 02/07/2022 08:44 AM    VLDL 56 02/07/2022 08:44 AM       Lab Results   Component Value Date/Time     02/18/2022 04:55 AM    K 4.3 02/18/2022 04:55 AM     02/18/2022 04:55 AM    CO2 27 02/18/2022 04:55 AM    BUN 32 02/18/2022 04:55 AM    GFRAA >60 02/18/2022 04:55 AM       Lab Results

## 2023-05-17 DIAGNOSIS — I25.10 CORONARY ARTERY DISEASE INVOLVING NATIVE CORONARY ARTERY OF NATIVE HEART WITHOUT ANGINA PECTORIS: ICD-10-CM

## 2023-05-17 LAB
ALBUMIN SERPL-MCNC: 4.1 G/DL (ref 3.5–5)
ALBUMIN/GLOB SERPL: 1 (ref 1.1–2.2)
ALP SERPL-CCNC: 64 U/L (ref 45–117)
ALT SERPL-CCNC: 26 U/L (ref 12–78)
AST SERPL-CCNC: 20 U/L (ref 15–37)
BILIRUB DIRECT SERPL-MCNC: 0.1 MG/DL (ref 0–0.2)
BILIRUB SERPL-MCNC: 0.5 MG/DL (ref 0.2–1)
CHOLEST SERPL-MCNC: 154 MG/DL
GLOBULIN SER CALC-MCNC: 4 G/DL (ref 2–4)
HDLC SERPL-MCNC: 44 MG/DL
HDLC SERPL: 3.5 (ref 0–5)
LDLC SERPL CALC-MCNC: 52.4 MG/DL (ref 0–100)
PROT SERPL-MCNC: 8.1 G/DL (ref 6.4–8.2)
TRIGL SERPL-MCNC: 288 MG/DL
VLDLC SERPL CALC-MCNC: 57.6 MG/DL

## 2023-06-05 ENCOUNTER — TELEPHONE (OUTPATIENT)
Age: 58
End: 2023-06-05

## 2023-06-05 NOTE — TELEPHONE ENCOUNTER
Verified patient with two types of identifiers. Scheduled patient to see Dr. Colletta Browns on 6/7 at [facility] per Dr. Angela Pulling request.  Patient verbalized understanding and will call with any other questions.       Future Appointments   Date Time Provider Mickie Scott   6/7/2023 10:40 AM MD PHILIP Lay BS AMB   6/16/2023  3:00 PM MD SAE John AMB   11/8/2023  8:40 AM MD SAE John BS AMB

## 2023-06-05 NOTE — TELEPHONE ENCOUNTER
TC to pt, Advised of need for appt per Dr. Layla Maguire. Offered appt 6/13/23, pt requested to be seen 6/16. Appt made. Ulisses he would also need to be seen by EP, and we would reach out to schedule an appt. No further questions.

## 2023-06-05 NOTE — TELEPHONE ENCOUNTER
----- Message from Melinda Stevens MD sent at 6/5/2023  8:56 AM EDT -----    Thanks Flaco  Need to see him soon overbook also needs ep soon!  ----- Message -----  From: Margarita Garcia MD  Sent: 6/5/2023   7:40 AM EDT  To: Melinda Stevens MD, Jodi Moraes, RN    Persistent atrial flutter with PVC on holter

## 2023-06-07 ENCOUNTER — OFFICE VISIT (OUTPATIENT)
Age: 58
End: 2023-06-07

## 2023-06-07 VITALS
SYSTOLIC BLOOD PRESSURE: 128 MMHG | OXYGEN SATURATION: 96 % | BODY MASS INDEX: 39.14 KG/M2 | HEART RATE: 113 BPM | DIASTOLIC BLOOD PRESSURE: 84 MMHG | WEIGHT: 273.4 LBS | HEIGHT: 70 IN

## 2023-06-07 DIAGNOSIS — I48.0 PAROXYSMAL ATRIAL FIBRILLATION (HCC): ICD-10-CM

## 2023-06-07 DIAGNOSIS — Z79.4 INSULIN DEPENDENT TYPE 2 DIABETES MELLITUS (HCC): ICD-10-CM

## 2023-06-07 DIAGNOSIS — I10 ESSENTIAL (PRIMARY) HYPERTENSION: ICD-10-CM

## 2023-06-07 DIAGNOSIS — I25.10 CORONARY ARTERY DISEASE INVOLVING NATIVE CORONARY ARTERY OF NATIVE HEART WITHOUT ANGINA PECTORIS: ICD-10-CM

## 2023-06-07 DIAGNOSIS — I48.92 ATRIAL FLUTTER, UNSPECIFIED TYPE (HCC): Primary | ICD-10-CM

## 2023-06-07 DIAGNOSIS — E11.9 INSULIN DEPENDENT TYPE 2 DIABETES MELLITUS (HCC): ICD-10-CM

## 2023-06-07 DIAGNOSIS — R53.83 FATIGUE, UNSPECIFIED TYPE: ICD-10-CM

## 2023-06-07 DIAGNOSIS — I48.92 ATRIAL FLUTTER, UNSPECIFIED TYPE (HCC): ICD-10-CM

## 2023-06-07 RX ORDER — ASPIRIN 81 MG/1
81 TABLET ORAL DAILY
Qty: 30 TABLET | Refills: 0
Start: 2023-06-07

## 2023-06-07 NOTE — PATIENT INSTRUCTIONS
Your TRANSESOPHAGEAL ECHOCARDIOGRAM (ALBA) AND CARDIOVERSION procedure has been scheduled for 6/16/23 at 200 PM, at [facility].    Please report to Admitting Department by 1230 PM, or 1.5 hours prior to your scheduled procedure. Please bring a list of your current medications and medication bottles, if able, to the hospital on this day. You will be unable to drive after your procedure so please make sure to bring someone with you to your procedure. You will need to have nothing to eat or drink after midnight, the night prior to your procedure. You may have small sips of water, if needed, to take with your medication. You will need labs drawn prior to your procedure. Please go to have this done today. You should NOT stop your medications prior to your scheduled procedure. PLEASE BE SURE TO TAKE YOUR ELIQUIS THE DAY OF PROCEDURE. After your procedure, you will need to follow up with Nathanael Hollis NP.  Your follow-up appointment has been scheduled for 7/26/23 at 1140 AM.

## 2023-06-07 NOTE — PROGRESS NOTES
(CYTOTEC) 200 MCG tablet, Take 1 tablet by mouth (Patient not taking: Reported on 6/7/2023), Disp: , Rfl:     Jarrett Magana MD    [facility]  Call center: (u) [phone] (z) [phone]      Christa Barbosa MD

## 2023-06-08 ENCOUNTER — TELEPHONE (OUTPATIENT)
Age: 58
End: 2023-06-08

## 2023-06-08 ENCOUNTER — PREP FOR PROCEDURE (OUTPATIENT)
Age: 58
End: 2023-06-08

## 2023-06-08 LAB
ANION GAP SERPL CALC-SCNC: 7 MMOL/L (ref 5–15)
BUN SERPL-MCNC: 34 MG/DL (ref 6–20)
BUN/CREAT SERPL: 22 (ref 12–20)
CALCIUM SERPL-MCNC: 9.9 MG/DL (ref 8.5–10.1)
CHLORIDE SERPL-SCNC: 104 MMOL/L (ref 97–108)
CO2 SERPL-SCNC: 28 MMOL/L (ref 21–32)
CREAT SERPL-MCNC: 1.53 MG/DL (ref 0.7–1.3)
GLUCOSE SERPL-MCNC: 168 MG/DL (ref 65–100)
MAGNESIUM SERPL-MCNC: 2.6 MG/DL (ref 1.6–2.4)
POTASSIUM SERPL-SCNC: 4.3 MMOL/L (ref 3.5–5.1)
SODIUM SERPL-SCNC: 139 MMOL/L (ref 136–145)

## 2023-06-08 RX ORDER — SODIUM CHLORIDE 0.9 % (FLUSH) 0.9 %
5-40 SYRINGE (ML) INJECTION EVERY 12 HOURS SCHEDULED
Status: CANCELLED | OUTPATIENT
Start: 2023-06-08

## 2023-06-08 RX ORDER — SODIUM CHLORIDE 0.9 % (FLUSH) 0.9 %
5-40 SYRINGE (ML) INJECTION PRN
Status: CANCELLED | OUTPATIENT
Start: 2023-06-08

## 2023-06-08 RX ORDER — SODIUM CHLORIDE 9 MG/ML
INJECTION, SOLUTION INTRAVENOUS PRN
Status: CANCELLED | OUTPATIENT
Start: 2023-06-08

## 2023-06-08 NOTE — TELEPHONE ENCOUNTER
Verified patient with two types of identifiers. Patient wanted to verify that he should still be taking the cardizem once he begins taking Multaq. Informed patient that per Dr. Alvin Morton office note, \"After cardioversion and he can continue with Multaq, will adjust cardizem and HCTZ, not ok to use with Multaq long term. \"  Also informed patient that prior auth for Multaq has been submitted. Patient verbalized understanding and will call with any other questions.

## 2023-06-08 NOTE — TELEPHONE ENCOUNTER
Pt called and would like to speak to nurse,stated he have questions about medication's,please advise    [phone]

## 2023-06-12 ENCOUNTER — TELEPHONE (OUTPATIENT)
Age: 58
End: 2023-06-12

## 2023-06-12 NOTE — TELEPHONE ENCOUNTER
Verified patient with two types of identifiers. Notified patient that Michelle Quispe, RN complete PA last week and it was denied because he has not tried Amiodarone [employer] formulary). Patient states Dr. Joanie Miller did not want him on Amiodarone. Will ask MD specific reason patient cannot use Amiodarone so I can submit to insurance. Can provide patient with samples for a short period. Patient appreciative of samples. Patient verbalized understanding and will call with any other questions.       Future Appointments   Date Time Provider [address] [place] [address]   6/16/2023  2:00  Kristin Castillo,[address] STRESS ECHO LAB [facility] [address] Castillo,[address]   6/16/2023  3:00 PM MD SAE Khanna BS AMB   7/26/2023 11:00 AM WAYLON Sr NP BS AMB   11/8/2023  8:40 AM MD SAE Khanna BS AMB

## 2023-06-12 NOTE — TELEPHONE ENCOUNTER
Pt stated that his insurance is asking for more information about the Multaq 400mg , pt insurance wants to know  why was pt prescribed to Multaq 40mg.      Insurance # [phone]

## 2023-06-16 ENCOUNTER — HOSPITAL ENCOUNTER (OUTPATIENT)
Facility: HOSPITAL | Age: 58
Discharge: HOME | End: 2023-06-18
Attending: INTERNAL MEDICINE
Payer: COMMERCIAL

## 2023-06-16 VITALS
SYSTOLIC BLOOD PRESSURE: 99 MMHG | BODY MASS INDEX: 38.74 KG/M2 | TEMPERATURE: 97.9 F | HEART RATE: 109 BPM | RESPIRATION RATE: 14 BRPM | OXYGEN SATURATION: 93 % | WEIGHT: 270 LBS | DIASTOLIC BLOOD PRESSURE: 72 MMHG

## 2023-06-16 DIAGNOSIS — I48.0 AF (PAROXYSMAL ATRIAL FIBRILLATION) (HCC): ICD-10-CM

## 2023-06-16 DIAGNOSIS — Z92.89 HISTORY OF CARDIOVERSION: ICD-10-CM

## 2023-06-16 LAB
EKG ATRIAL RATE: 96 BPM
EKG ATRIAL RATE: 96 BPM
EKG DIAGNOSIS: NORMAL
EKG DIAGNOSIS: NORMAL
EKG P AXIS: 71 DEGREES
EKG P AXIS: 79 DEGREES
EKG P-R INTERVAL: 178 MS
EKG P-R INTERVAL: 186 MS
EKG Q-T INTERVAL: 358 MS
EKG Q-T INTERVAL: 358 MS
EKG QRS DURATION: 88 MS
EKG QRS DURATION: 88 MS
EKG QTC CALCULATION (BAZETT): 452 MS
EKG QTC CALCULATION (BAZETT): 452 MS
EKG R AXIS: -21 DEGREES
EKG R AXIS: -23 DEGREES
EKG T AXIS: 49 DEGREES
EKG T AXIS: 53 DEGREES
EKG VENTRICULAR RATE: 96 BPM
EKG VENTRICULAR RATE: 96 BPM

## 2023-06-16 PROCEDURE — 92960 CARDIOVERSION ELECTRIC EXT: CPT | Performed by: INTERNAL MEDICINE

## 2023-06-16 PROCEDURE — 99152 MOD SED SAME PHYS/QHP 5/>YRS: CPT | Performed by: INTERNAL MEDICINE

## 2023-06-16 PROCEDURE — 92960 CARDIOVERSION ELECTRIC EXT: CPT

## 2023-06-16 PROCEDURE — 93325 DOPPLER ECHO COLOR FLOW MAPG: CPT | Performed by: INTERNAL MEDICINE

## 2023-06-16 PROCEDURE — 3700000000 HC ANESTHESIA ATTENDED CARE

## 2023-06-16 PROCEDURE — 93312 ECHO TRANSESOPHAGEAL: CPT | Performed by: INTERNAL MEDICINE

## 2023-06-16 PROCEDURE — 93320 DOPPLER ECHO COMPLETE: CPT | Performed by: INTERNAL MEDICINE

## 2023-06-16 PROCEDURE — 3700000001 HC ADD 15 MINUTES (ANESTHESIA)

## 2023-06-16 NOTE — PROGRESS NOTES
Patient has returned to baseline at arrival for procedure. VSS  Pt awake, alert, and oriented. Pt remains in SR; pt denies chest pain, SOB, and dizziness. Discussed AVS and discharge instructions with patient and with his wife; and provided a copy to take home. Pt and his wife voiced understanding and denied any questions or need for clarification. IV D/C'd and hemostasis attained. Coban and gauze dressing applied. Transported with:  Brody Martinez RN via w/c to vehicle driven by his wife.

## 2023-06-16 NOTE — PROGRESS NOTES
Pt arrives ambulatory to noninvasive cardiology department accompanied by his wife for ALBA with possible direct current cardioversion procedure. All assessments completed and consent was reviewed. Education given was regarding procedure, medications to be given, potential side effects of medications to be given, post-procedure management and follow-up. Opportunity for questions was provided and all questions and concerns were addressed. Patient and patient contact verbalized understanding of education.

## 2023-06-16 NOTE — PROCEDURES
Cardiac Procedure Note   Patient: Geraldo Rich  MRN: [id number]  SSN: [id number]   YOB: 1965 Age: 63 y.o.   Sex: male    Date of Procedure: 6/16/2023   Pre-procedure Diagnosis: Atrial Fibrillation/Atrial Flutter, morbid obesity  Post-procedure Diagnosis: same  Procedure: ALBA and Cardioversion  :  Dr. Luca Dubois MD    Assistant(s):  None  Anesthesia: Moderate Sedation by CRNA  Estimated Blood Loss: none  Specimens Removed: None  Findings: normal LVEF   Mild to moderate LAE  No thrombus  Mild AR, MR  360 J CV to NSR  Complications: None   Implants:  None  Signed by:  Luca Dubois MD  6/16/2023  2:03 PM

## 2023-06-16 NOTE — DISCHARGE INSTRUCTIONS
Do not drive today  Resume all medications  Future Appointments   Date Time Provider Mickie Scott   7/26/2023 11:00 AM WAYLON Anderson NP AMB   11/8/2023  8:40 AM MD SAE Callahan AMB     AFTER YOU TRANSESOPHAGEAL ECHOCARDIOGRAM    Be sure someone else drives you home. You may feel drowsy for several hours. Call your doctor if:    You are bleeding from your throat or mouth. You have trouble breathing all of a sudden. You have chest pain or any pain that spreads to your neck, jaw, or arms. You have questions or concerns. You have a fever greater than 101°F.    Doctor: Gracia Fernandes    Special Instructions:    No driving for 24 hours. Discharge Instructions for Cardioversion    Your healthcare provider performed a procedure called cardioversion. Your healthcare provider used a controlled electric shock or a medicine to briefly stop all electrical activity in your heart. This helped restore your hearts normal rhythm. Here are some instructions to follow while you recover. Home care  Because cardioversion typically requires sedation, you won't be able to drive home. You will need a ride. Wait at least 24 hours before driving a car or operating heavy machinery after receiving sedating medicines. Dont be alarmed if the skin on your chest is irritated or feels like it is sunburned. Your healthcare provider may prescribe a soothing lotion to relieve this discomfort. These minor symptoms will go away in a few days. Ask your healthcare provider about medicines to keep your heart rhythm steady. If you were prescribed medicine, take it as instructed by your healthcare provider. Dont skip doses or take double doses. Cardioversion requires blood thinners for at least 4 weeks to prevent a delayed risk of stroke when treating atrial fibrillation or atrial flutter. Be sure you discuss which medicine you are taking to prevent stroke.  Ask when you need to have your medicine

## 2023-06-16 NOTE — INTERVAL H&P NOTE
Update History & Physical    The patient's History and Physical of June 7, 2023 was reviewed with the patient and I examined the patient. There was no change. The surgical site was confirmed by the patient and me. Plan: The risks, benefits, expected outcome, and alternative to the recommended procedure have been discussed with the patient. Patient understands and wants to proceed with the procedure.      Electronically signed by Jody Mcnulty MD on 6/16/2023 at 1:38 PM

## 2023-06-20 ENCOUNTER — PATIENT MESSAGE (OUTPATIENT)
Age: 58
End: 2023-06-20

## 2023-06-21 ENCOUNTER — PATIENT MESSAGE (OUTPATIENT)
Age: 58
End: 2023-06-21

## 2023-06-22 RX ORDER — AMIODARONE HYDROCHLORIDE 200 MG/1
200 TABLET ORAL DAILY
Qty: 60 TABLET | Refills: 2 | Status: SHIPPED | OUTPATIENT
Start: 2023-06-22

## 2023-06-28 ENCOUNTER — TELEPHONE (OUTPATIENT)
Age: 58
End: 2023-06-28

## 2023-06-28 DIAGNOSIS — I48.92 ATRIAL FLUTTER, UNSPECIFIED TYPE (HCC): ICD-10-CM

## 2023-06-28 DIAGNOSIS — I48.0 AF (PAROXYSMAL ATRIAL FIBRILLATION) (HCC): Primary | ICD-10-CM

## 2023-07-07 ENCOUNTER — PATIENT MESSAGE (OUTPATIENT)
Age: 58
End: 2023-07-07

## 2023-07-10 ENCOUNTER — TELEPHONE (OUTPATIENT)
Age: 58
End: 2023-07-10

## 2023-07-10 NOTE — TELEPHONE ENCOUNTER
7/10/23 ARTI for pt of appt time and day change from 7/26/23 [facility] [phone]

## 2023-07-10 NOTE — TELEPHONE ENCOUNTER
Verified patient with two types of identifiers. Called patient on his cell phone number. Patient states that for a few days last week, he felt as though he was having some skipped beats. He is no longer experiencing this and says that he feels fine. Patient attempted to take amiodarone for 1 week, but states that it made him feel bad. He stopped taking the amiodarone and has been back on his Multaq for a little over a week and states that he is no longer experiencing any nausea. Informed patient that I would check to see if Multaq offers any patient assistance. Verified date/time change for upcoming appointment with Tony Crowell NP. Patient verbalized understanding and will call with any other questions.         Future Appointments   Date Time Provider [address]   7/21/2023  8:00 AM [facility] CT 1 [facility]   8/1/2023 10:00 AM WAYLON Matamoros NP AMB   9/1/2023  9:00 AM WAYLON Matamoros NP AMB   9/18/2023  1:00 PM [facility] CATH LAB 3 [facility]   10/2/2023 10:20 AM WAYLON Matamoros NP AMB   11/8/2023  8:40 AM MD SAE Miller BS AMB

## 2023-07-10 NOTE — TELEPHONE ENCOUNTER
Called patient but no answer. Requested that patient send a [employer] message when he is available for phone call.

## 2023-07-16 ENCOUNTER — PATIENT MESSAGE (OUTPATIENT)
Age: 58
End: 2023-07-16

## 2023-07-20 NOTE — PROGRESS NOTES
[facility] CARDIOLOGY  Cardiac Electrophysiology Care Note         Patient Name: Bonilla Velasco - WBY:[id number]  Primary Cardiologist: Lianet Spangler MD  Consulting Cardiologist: Zuleyka Magdaleno MD     Reason for encounter: atrial flutter    HPI:       Devon Agustin is a 63 y.o. male with PMH significant for atrial fibrillation ablation in 2015 who has been tired and holter showed rare PAC but he has persistent atrial flutter. He underwent successful ALBA/cardioversion. Insurance denied Multaq but he is on amiodarone as a bridge to ablation. He continues Eliquis without any bleeding issues. TRANSTHORACIC ECHOCARDIOGRAM (TTE) COMPLETE (CONTRAST/BUBBLE/3D PRN) 05/03/2023  5:46 PM (Final)         Left Ventricle: Normal left ventricular systolic function with a visually estimated EF of 60 - 65%. Left ventricle size is normal. Normal wall thickness. Moderate hypokinesis of the following segments: apical anterior, apical inferior and apical septal. Diastolic dysfunction present with normal LV EF. 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 Right Ventricle: Not well visualized. Right ventricle size is normal.    Aortic Valve: Tricuspid valve. Moderately calcified cusp. Mild regurgitation. Mild stenosis of the aortic valve. AV mean gradient is 12 mmHg. AV peak gradient is 19 mmHg. AV peak velocity is 2.2 m/s. LVOT diameter is 2.3 cm. AV area by continuity VTI is 2.0 cm2. AV area by peak velocity is 1.8 cm2. Left Atrium: Not well visualized. Left atrium is mildly dilated. Contrast used: Definity. He had known CAD and no active ischemia        Assessment and Plan      Diagnosis Orders   1. Atrial flutter, unspecified type ([address])  EKG 12 Lead      2. High risk medication use  EKG 12 Lead      3. Anticoagulated  EKG 12 Lead      4.  History of cardioversion  EKG 12 Lead              Atrial flutter with hx of AF ablation:   - Previously on Multaq  - S/p ALBA/DCCV 6/16/23  - Continue

## 2023-07-21 ENCOUNTER — HOSPITAL ENCOUNTER (OUTPATIENT)
Facility: HOSPITAL | Age: 58
Discharge: HOME | End: 2023-07-21
Attending: INTERNAL MEDICINE
Payer: COMMERCIAL

## 2023-07-21 DIAGNOSIS — I48.0 AF (PAROXYSMAL ATRIAL FIBRILLATION) (HCC): ICD-10-CM

## 2023-07-21 DIAGNOSIS — I48.92 ATRIAL FLUTTER, UNSPECIFIED TYPE (HCC): ICD-10-CM

## 2023-07-21 PROCEDURE — 6360000004 HC RX CONTRAST MEDICATION: Performed by: STUDENT IN AN ORGANIZED HEALTH CARE EDUCATION/TRAINING PROGRAM

## 2023-07-21 PROCEDURE — 71275 CT ANGIOGRAPHY CHEST: CPT

## 2023-07-21 RX ADMIN — IOPAMIDOL 100 ML: 755 INJECTION, SOLUTION INTRAVENOUS at 08:26

## 2023-08-01 ENCOUNTER — OFFICE VISIT (OUTPATIENT)
Age: 58
End: 2023-08-01

## 2023-08-01 VITALS
HEIGHT: 70 IN | DIASTOLIC BLOOD PRESSURE: 70 MMHG | SYSTOLIC BLOOD PRESSURE: 128 MMHG | OXYGEN SATURATION: 94 % | WEIGHT: 273.4 LBS | BODY MASS INDEX: 39.14 KG/M2 | RESPIRATION RATE: 14 BRPM | HEART RATE: 80 BPM

## 2023-08-01 DIAGNOSIS — Z92.89 HISTORY OF CARDIOVERSION: ICD-10-CM

## 2023-08-01 DIAGNOSIS — Z79.01 ANTICOAGULATED: ICD-10-CM

## 2023-08-01 DIAGNOSIS — I48.92 ATRIAL FLUTTER, UNSPECIFIED TYPE (HCC): Primary | ICD-10-CM

## 2023-08-01 DIAGNOSIS — Z79.899 HIGH RISK MEDICATION USE: ICD-10-CM

## 2023-08-01 RX ORDER — AMIODARONE HYDROCHLORIDE 200 MG/1
200 TABLET ORAL DAILY
COMMUNITY

## 2023-08-07 ENCOUNTER — TELEPHONE (OUTPATIENT)
Age: 58
End: 2023-08-07

## 2023-08-18 ENCOUNTER — TELEPHONE (OUTPATIENT)
Age: 58
End: 2023-08-18

## 2023-08-31 ENCOUNTER — TELEPHONE (OUTPATIENT)
Age: 58
End: 2023-08-31

## 2023-08-31 NOTE — TELEPHONE ENCOUNTER
Pt. Wants to confirm if he should come in for the appt. He has tomorrow with Nicholas Coy. He recalls Ms. French Stone telling him he didn't need to come in. He is ok to come in or not, just wants to see if he should. Asking to leave a detailed message if he is not able to answer.      Phone - [phone]

## 2023-08-31 NOTE — TELEPHONE ENCOUNTER
Attempted to reach patient by telephone. HIPAA compliant message was left letting patient know that it is his choice if he wants to come to his appointment with Franklin Cartagena tomorrow. If he has all of his instructions and everything he needs for his procedure on 9/18/23 then he doesn't need to come in. If he would like to come in to review then we are happy to see him. Asked for a return call to let us know how he would like to proceed.

## 2023-09-05 RX ORDER — FOSINOPRIL SODIUM 40 MG/1
TABLET ORAL
Qty: 90 TABLET | Refills: 0 | Status: SHIPPED | OUTPATIENT
Start: 2023-09-05

## 2023-09-05 NOTE — TELEPHONE ENCOUNTER
Cardiologist: Dr. Shital Villarreal   Date Time Provider [address]   9/18/2023  1:30 PM [facility] CATH LAB 3 [facility]   10/5/2023  2:20 PM WAYLON Zurita NP AMB   11/8/2023  8:40 AM MD SAE Georges BS AMB       Requested Prescriptions     Signed Prescriptions Disp Refills    fosinopril (MONOPRIL) 40 MG tablet 90 tablet 0     Sig: Take 1 tablet by mouth once daily     Authorizing Provider: Gavino Guillermo     Ordering User: ELDER GUERRERO         Refills VO per Dr. Tracy Johnson.

## 2023-09-11 ENCOUNTER — CLINICAL DOCUMENTATION (OUTPATIENT)
Age: 58
End: 2023-09-11

## 2023-09-11 ENCOUNTER — PATIENT MESSAGE (OUTPATIENT)
Age: 58
End: 2023-09-11

## 2023-09-11 NOTE — TELEPHONE ENCOUNTER
Patient is scheduled for AF ablation 09/18/2023, would need uninterrupted anticoagulation thereafter. The tendon surgery would either need to be done before the ablation or >4 weeks afterward. If needed, we could push out the ablation, but that would probably mean going >1 month out; he would just continue amiodarone in the interim to control things.

## 2023-09-13 ENCOUNTER — TELEPHONE (OUTPATIENT)
Age: 58
End: 2023-09-13

## 2023-09-13 NOTE — TELEPHONE ENCOUNTER
Verified patient with two types of identifiers. Informed patient that I attempted to reach out to Dr. Dilma Fowler office twice but had to leave voicemails. Patient will call back to discuss rescheduling ablation. Will cancel procedure for 9/18. Patient verbalized understanding and will call with any other questions.

## 2023-09-13 NOTE — TELEPHONE ENCOUNTER
Spoke to Bobbi at Dr. Radha Patel office. Informed of Dr. Iris Peña message to patient. You should have orthopedic surgery  You may hold eliquis 2-3 days before surgery  I will ask my nurse reschedule ablation once you fully recover from surgery     Good luck with the operation     Faxed note to Dr. Radha Patel office @ [phone]. Confirmation received.

## 2023-09-14 ENCOUNTER — HOSPITAL ENCOUNTER (OUTPATIENT)
Facility: HOSPITAL | Age: 58
Discharge: HOME | End: 2023-09-14
Payer: COMMERCIAL

## 2023-09-14 VITALS
WEIGHT: 278 LBS | HEART RATE: 74 BPM | HEIGHT: 70 IN | DIASTOLIC BLOOD PRESSURE: 68 MMHG | BODY MASS INDEX: 39.8 KG/M2 | TEMPERATURE: 98.3 F | SYSTOLIC BLOOD PRESSURE: 106 MMHG

## 2023-09-14 LAB
ANION GAP SERPL CALC-SCNC: 4 MMOL/L (ref 5–15)
BASOPHILS # BLD: 0.1 K/UL (ref 0–0.1)
BASOPHILS NFR BLD: 1 % (ref 0–1)
BUN SERPL-MCNC: 27 MG/DL (ref 6–20)
BUN/CREAT SERPL: 13 (ref 12–20)
CALCIUM SERPL-MCNC: 9.8 MG/DL (ref 8.5–10.1)
CHLORIDE SERPL-SCNC: 105 MMOL/L (ref 97–108)
CO2 SERPL-SCNC: 30 MMOL/L (ref 21–32)
CREAT SERPL-MCNC: 2.01 MG/DL (ref 0.7–1.3)
DIFFERENTIAL METHOD BLD: NORMAL
EOSINOPHIL # BLD: 0.3 K/UL (ref 0–0.4)
EOSINOPHIL NFR BLD: 4 % (ref 0–7)
ERYTHROCYTE [DISTWIDTH] IN BLOOD BY AUTOMATED COUNT: 13.6 % (ref 11.5–14.5)
EST. AVERAGE GLUCOSE BLD GHB EST-MCNC: 151 MG/DL
GLUCOSE SERPL-MCNC: 163 MG/DL (ref 65–100)
HBA1C MFR BLD: 6.9 % (ref 4–5.6)
HCT VFR BLD AUTO: 47.9 % (ref 36.6–50.3)
HGB BLD-MCNC: 15.3 G/DL (ref 12.1–17)
IMM GRANULOCYTES # BLD AUTO: 0 K/UL (ref 0–0.04)
IMM GRANULOCYTES NFR BLD AUTO: 0 % (ref 0–0.5)
LYMPHOCYTES # BLD: 1.9 K/UL (ref 0.8–3.5)
LYMPHOCYTES NFR BLD: 27 % (ref 12–49)
MCH RBC QN AUTO: 28.5 PG (ref 26–34)
MCHC RBC AUTO-ENTMCNC: 31.9 G/DL (ref 30–36.5)
MCV RBC AUTO: 89.4 FL (ref 80–99)
MONOCYTES # BLD: 0.7 K/UL (ref 0–1)
MONOCYTES NFR BLD: 10 % (ref 5–13)
NEUTS SEG # BLD: 4.1 K/UL (ref 1.8–8)
NEUTS SEG NFR BLD: 58 % (ref 32–75)
NRBC # BLD: 0 K/UL (ref 0–0.01)
NRBC BLD-RTO: 0 PER 100 WBC
PLATELET # BLD AUTO: 206 K/UL (ref 150–400)
PMV BLD AUTO: 9.7 FL (ref 8.9–12.9)
POTASSIUM SERPL-SCNC: 4.4 MMOL/L (ref 3.5–5.1)
RBC # BLD AUTO: 5.36 M/UL (ref 4.1–5.7)
SODIUM SERPL-SCNC: 139 MMOL/L (ref 136–145)
WBC # BLD AUTO: 7 K/UL (ref 4.1–11.1)

## 2023-09-14 PROCEDURE — 85025 COMPLETE CBC W/AUTO DIFF WBC: CPT

## 2023-09-14 PROCEDURE — 83036 HEMOGLOBIN GLYCOSYLATED A1C: CPT

## 2023-09-14 PROCEDURE — 36415 COLL VENOUS BLD VENIPUNCTURE: CPT

## 2023-09-14 PROCEDURE — 80048 BASIC METABOLIC PNL TOTAL CA: CPT

## 2023-09-14 NOTE — PERIOP NOTE
[address] INSTRUCTIONS    Surgery Date:   9/20/23    Your surgeon's office or [facility] staff will call you between 4 PM- 8 PM the day before surgery with your arrival time. If your surgery is on a Monday, you will receive a call the preceding Friday. Please report to [facility] Patient Access/Admitting on the 1st floor. Bring your insurance card, photo identification, and any copayment ( if applicable). If you are going home the same day of your surgery, you must have a responsible adult to drive you home. You need to have a responsible adult to stay with you the first 24 hours after surgery and you should not drive a car for 24 hours following your surgery. Do NOT eat any solid foods after midnight the night before surgery including candy, mint or gum. You may drink clear liquids from midnight until 1 hour prior to your arrival. You may drink up to 12 ounces at one time every 4 hours. Please note special instructions, if applicable, below for medications. Do NOT drink alcohol or smoke 24 hours before surgery. STOP smoking for 14 days prior as it helps with breathing and healing after surgery. If you are being admitted to the hospital, please leave personal belongings/luggage in your car until you have an assigned hospital room number. Please wear comfortable clothes. Wear your glasses instead of contacts. We ask that all money, jewelry and valuables be left at home. Wear no make up, particularly mascara, the day of surgery. All body piercings, rings, and jewelry need to be removed and left at home. Please remove any nail polish or artifical nails from your fingernails. Please wear your hair loose or down. Please no pony-tails, buns, or any metal hair accessories. If you shower the morning of surgery, please do not apply any lotions or powders afterwards. You may wear deodorant, unless having breast surgery. Do not shave any body area within 24 hours of your surgery.   Please your surgery. Poorly managed blood sugar levels slow down wound healing and prevent you from healing completely. Patient Information Regarding COVID Restrictions      Day of Procedure    Please park in the parking deck or any designated visitor parking lot. Enter the facility through the Main Entrance of the hospital.  On the day of surgery, please provide the cell phone number of the person who will be waiting for you to the Patient Access representative at the time of registration. Masks are highly recommended in the hospital, but not required. Once your procedure and the immediate recovery period is completed, a nurse in the recovery area will contact your designated visitor to inform them of your room number or to otherwise review other pertinent information regarding your care. Social distancing practices are strongly encouraged in waiting areas and the cafeteria. The patient was contacted in person. He verbalized understanding of all instructions does not need reinforcement.

## 2023-09-15 ENCOUNTER — HOSPITAL ENCOUNTER (OUTPATIENT)
Facility: HOSPITAL | Age: 58
Discharge: HOME | End: 2023-09-15
Payer: COMMERCIAL

## 2023-09-15 LAB
ANION GAP SERPL CALC-SCNC: 5 MMOL/L (ref 5–15)
BUN SERPL-MCNC: 30 MG/DL (ref 6–20)
BUN/CREAT SERPL: 18 (ref 12–20)
CALCIUM SERPL-MCNC: 9.5 MG/DL (ref 8.5–10.1)
CHLORIDE SERPL-SCNC: 105 MMOL/L (ref 97–108)
CO2 SERPL-SCNC: 30 MMOL/L (ref 21–32)
CREAT SERPL-MCNC: 1.7 MG/DL (ref 0.7–1.3)
GLUCOSE SERPL-MCNC: 154 MG/DL (ref 65–100)
POTASSIUM SERPL-SCNC: 3.8 MMOL/L (ref 3.5–5.1)
SODIUM SERPL-SCNC: 140 MMOL/L (ref 136–145)

## 2023-09-15 PROCEDURE — 80048 BASIC METABOLIC PNL TOTAL CA: CPT

## 2023-09-15 PROCEDURE — 36415 COLL VENOUS BLD VENIPUNCTURE: CPT

## 2023-09-18 ENCOUNTER — PATIENT MESSAGE (OUTPATIENT)
Age: 58
End: 2023-09-18

## 2023-09-19 RX ORDER — AMIODARONE HYDROCHLORIDE 200 MG/1
200 TABLET ORAL DAILY
Qty: 90 TABLET | Refills: 1 | Status: SHIPPED | OUTPATIENT
Start: 2023-09-19

## 2023-09-19 NOTE — TELEPHONE ENCOUNTER
From: Sloane Speaker  To: Dr. Renetta Luu: 9/18/2023 10:17 PM EDT  Subject: Daya Castros to refill my Pacerone and was told the prescription was no longer valid I assume I will take this until my ablation is rescheduled so can you approve refills or advise

## 2023-09-19 NOTE — TELEPHONE ENCOUNTER
Request for Amiodarone 200 mg daily. Last office visit 8/1/23, next office visit 10/2/23. Refills per verbal order from Dr. Michaelle Morris.

## 2023-09-20 ENCOUNTER — HOSPITAL ENCOUNTER (OUTPATIENT)
Facility: HOSPITAL | Age: 58
Setting detail: OUTPATIENT SURGERY
Discharge: HOME | End: 2023-09-20
Attending: ORTHOPAEDIC SURGERY | Admitting: ORTHOPAEDIC SURGERY
Payer: COMMERCIAL

## 2023-09-20 ENCOUNTER — ANESTHESIA EVENT (OUTPATIENT)
Facility: HOSPITAL | Age: 58
End: 2023-09-20
Payer: COMMERCIAL

## 2023-09-20 ENCOUNTER — ANESTHESIA (OUTPATIENT)
Facility: HOSPITAL | Age: 58
End: 2023-09-20
Payer: COMMERCIAL

## 2023-09-20 VITALS
HEART RATE: 76 BPM | OXYGEN SATURATION: 93 % | SYSTOLIC BLOOD PRESSURE: 119 MMHG | TEMPERATURE: 97.6 F | RESPIRATION RATE: 15 BRPM | DIASTOLIC BLOOD PRESSURE: 60 MMHG

## 2023-09-20 LAB
GLUCOSE BLD STRIP.AUTO-MCNC: 146 MG/DL (ref 65–117)
SERVICE CMNT-IMP: ABNORMAL

## 2023-09-20 PROCEDURE — 82962 GLUCOSE BLOOD TEST: CPT

## 2023-09-20 PROCEDURE — 6360000002 HC RX W HCPCS: Performed by: NURSE ANESTHETIST, CERTIFIED REGISTERED

## 2023-09-20 PROCEDURE — 3700000001 HC ADD 15 MINUTES (ANESTHESIA): Performed by: ORTHOPAEDIC SURGERY

## 2023-09-20 PROCEDURE — 6360000002 HC RX W HCPCS: Performed by: ANESTHESIOLOGY

## 2023-09-20 PROCEDURE — 64445 NJX AA&/STRD SCIATIC NRV IMG: CPT | Performed by: ANESTHESIOLOGY

## 2023-09-20 PROCEDURE — 7100000011 HC PHASE II RECOVERY - ADDTL 15 MIN: Performed by: ORTHOPAEDIC SURGERY

## 2023-09-20 PROCEDURE — 7100000010 HC PHASE II RECOVERY - FIRST 15 MIN: Performed by: ORTHOPAEDIC SURGERY

## 2023-09-20 PROCEDURE — C1713 ANCHOR/SCREW BN/BN,TIS/BN: HCPCS | Performed by: ORTHOPAEDIC SURGERY

## 2023-09-20 PROCEDURE — 2580000003 HC RX 258: Performed by: ANESTHESIOLOGY

## 2023-09-20 PROCEDURE — 3600000013 HC SURGERY LEVEL 3 ADDTL 15MIN: Performed by: ORTHOPAEDIC SURGERY

## 2023-09-20 PROCEDURE — 2709999900 HC NON-CHARGEABLE SUPPLY: Performed by: ORTHOPAEDIC SURGERY

## 2023-09-20 PROCEDURE — 7100000000 HC PACU RECOVERY - FIRST 15 MIN: Performed by: ORTHOPAEDIC SURGERY

## 2023-09-20 PROCEDURE — 2720000010 HC SURG SUPPLY STERILE: Performed by: ORTHOPAEDIC SURGERY

## 2023-09-20 PROCEDURE — 2500000003 HC RX 250 WO HCPCS: Performed by: NURSE ANESTHETIST, CERTIFIED REGISTERED

## 2023-09-20 PROCEDURE — 7100000001 HC PACU RECOVERY - ADDTL 15 MIN: Performed by: ORTHOPAEDIC SURGERY

## 2023-09-20 PROCEDURE — 3600000003 HC SURGERY LEVEL 3 BASE: Performed by: ORTHOPAEDIC SURGERY

## 2023-09-20 PROCEDURE — 3700000000 HC ANESTHESIA ATTENDED CARE: Performed by: ORTHOPAEDIC SURGERY

## 2023-09-20 DEVICE — ANCHOR SUT NDL DX FIBERTAK: Type: IMPLANTABLE DEVICE | Site: FOOT | Status: FUNCTIONAL

## 2023-09-20 RX ORDER — SODIUM CHLORIDE 0.9 % (FLUSH) 0.9 %
5-40 SYRINGE (ML) INJECTION EVERY 12 HOURS SCHEDULED
Status: DISCONTINUED | OUTPATIENT
Start: 2023-09-20 | End: 2023-09-20 | Stop reason: HOSPADM

## 2023-09-20 RX ORDER — LIDOCAINE HYDROCHLORIDE 10 MG/ML
1 INJECTION, SOLUTION EPIDURAL; INFILTRATION; INTRACAUDAL; PERINEURAL
Status: DISCONTINUED | OUTPATIENT
Start: 2023-09-20 | End: 2023-09-20 | Stop reason: HOSPADM

## 2023-09-20 RX ORDER — SODIUM CHLORIDE, SODIUM LACTATE, POTASSIUM CHLORIDE, CALCIUM CHLORIDE 600; 310; 30; 20 MG/100ML; MG/100ML; MG/100ML; MG/100ML
INJECTION, SOLUTION INTRAVENOUS CONTINUOUS
Status: DISCONTINUED | OUTPATIENT
Start: 2023-09-20 | End: 2023-09-20 | Stop reason: HOSPADM

## 2023-09-20 RX ORDER — PROPOFOL 10 MG/ML
INJECTION, EMULSION INTRAVENOUS PRN
Status: DISCONTINUED | OUTPATIENT
Start: 2023-09-20 | End: 2023-09-20 | Stop reason: SDUPTHER

## 2023-09-20 RX ORDER — ACETAMINOPHEN 500 MG
1000 TABLET ORAL ONCE
Status: DISCONTINUED | OUTPATIENT
Start: 2023-09-20 | End: 2023-09-20 | Stop reason: HOSPADM

## 2023-09-20 RX ORDER — HYDROMORPHONE HYDROCHLORIDE 1 MG/ML
0.5 INJECTION, SOLUTION INTRAMUSCULAR; INTRAVENOUS; SUBCUTANEOUS EVERY 10 MIN PRN
Status: DISCONTINUED | OUTPATIENT
Start: 2023-09-20 | End: 2023-09-20 | Stop reason: HOSPADM

## 2023-09-20 RX ORDER — SODIUM CHLORIDE 0.9 % (FLUSH) 0.9 %
5-40 SYRINGE (ML) INJECTION PRN
Status: DISCONTINUED | OUTPATIENT
Start: 2023-09-20 | End: 2023-09-20 | Stop reason: HOSPADM

## 2023-09-20 RX ORDER — CEFAZOLIN SODIUM 1 G/3ML
INJECTION, POWDER, FOR SOLUTION INTRAMUSCULAR; INTRAVENOUS PRN
Status: DISCONTINUED | OUTPATIENT
Start: 2023-09-20 | End: 2023-09-20 | Stop reason: SDUPTHER

## 2023-09-20 RX ORDER — LIDOCAINE HYDROCHLORIDE 20 MG/ML
INJECTION, SOLUTION EPIDURAL; INFILTRATION; INTRACAUDAL; PERINEURAL PRN
Status: DISCONTINUED | OUTPATIENT
Start: 2023-09-20 | End: 2023-09-20 | Stop reason: SDUPTHER

## 2023-09-20 RX ORDER — OXYCODONE HYDROCHLORIDE 5 MG/1
5 TABLET ORAL
Status: DISCONTINUED | OUTPATIENT
Start: 2023-09-20 | End: 2023-09-20 | Stop reason: HOSPADM

## 2023-09-20 RX ORDER — FENTANYL CITRATE 50 UG/ML
100 INJECTION, SOLUTION INTRAMUSCULAR; INTRAVENOUS
Status: COMPLETED | OUTPATIENT
Start: 2023-09-20 | End: 2023-09-20

## 2023-09-20 RX ORDER — HYDRALAZINE HYDROCHLORIDE 20 MG/ML
10 INJECTION INTRAMUSCULAR; INTRAVENOUS
Status: DISCONTINUED | OUTPATIENT
Start: 2023-09-20 | End: 2023-09-20 | Stop reason: HOSPADM

## 2023-09-20 RX ORDER — PROCHLORPERAZINE EDISYLATE 5 MG/ML
5 INJECTION INTRAMUSCULAR; INTRAVENOUS
Status: DISCONTINUED | OUTPATIENT
Start: 2023-09-20 | End: 2023-09-20 | Stop reason: HOSPADM

## 2023-09-20 RX ORDER — ONDANSETRON 2 MG/ML
INJECTION INTRAMUSCULAR; INTRAVENOUS PRN
Status: DISCONTINUED | OUTPATIENT
Start: 2023-09-20 | End: 2023-09-20 | Stop reason: SDUPTHER

## 2023-09-20 RX ORDER — DEXAMETHASONE SODIUM PHOSPHATE 4 MG/ML
INJECTION, SOLUTION INTRA-ARTICULAR; INTRALESIONAL; INTRAMUSCULAR; INTRAVENOUS; SOFT TISSUE PRN
Status: DISCONTINUED | OUTPATIENT
Start: 2023-09-20 | End: 2023-09-20 | Stop reason: SDUPTHER

## 2023-09-20 RX ORDER — MIDAZOLAM HYDROCHLORIDE 2 MG/2ML
2 INJECTION, SOLUTION INTRAMUSCULAR; INTRAVENOUS
Status: COMPLETED | OUTPATIENT
Start: 2023-09-20 | End: 2023-09-20

## 2023-09-20 RX ORDER — SODIUM CHLORIDE 9 MG/ML
INJECTION, SOLUTION INTRAVENOUS PRN
Status: DISCONTINUED | OUTPATIENT
Start: 2023-09-20 | End: 2023-09-20 | Stop reason: HOSPADM

## 2023-09-20 RX ORDER — ROPIVACAINE HYDROCHLORIDE 5 MG/ML
INJECTION, SOLUTION EPIDURAL; INFILTRATION; PERINEURAL
Status: COMPLETED | OUTPATIENT
Start: 2023-09-20 | End: 2023-09-20

## 2023-09-20 RX ORDER — HYDROMORPHONE HYDROCHLORIDE 1 MG/ML
0.5 INJECTION, SOLUTION INTRAMUSCULAR; INTRAVENOUS; SUBCUTANEOUS EVERY 5 MIN PRN
Status: DISCONTINUED | OUTPATIENT
Start: 2023-09-20 | End: 2023-09-20 | Stop reason: HOSPADM

## 2023-09-20 RX ORDER — ONDANSETRON 2 MG/ML
4 INJECTION INTRAMUSCULAR; INTRAVENOUS
Status: DISCONTINUED | OUTPATIENT
Start: 2023-09-20 | End: 2023-09-20 | Stop reason: HOSPADM

## 2023-09-20 RX ADMIN — MIDAZOLAM 2 MG: 1 INJECTION INTRAMUSCULAR; INTRAVENOUS at 14:13

## 2023-09-20 RX ADMIN — CEFAZOLIN 2 G: 1 INJECTION, POWDER, FOR SOLUTION INTRAMUSCULAR; INTRAVENOUS at 14:24

## 2023-09-20 RX ADMIN — DEXAMETHASONE SODIUM PHOSPHATE 4 MG: 4 INJECTION, SOLUTION INTRAMUSCULAR; INTRAVENOUS at 14:29

## 2023-09-20 RX ADMIN — FENTANYL CITRATE 100 MCG: 50 INJECTION, SOLUTION INTRAMUSCULAR; INTRAVENOUS at 14:13

## 2023-09-20 RX ADMIN — SODIUM CHLORIDE, POTASSIUM CHLORIDE, SODIUM LACTATE AND CALCIUM CHLORIDE: 600; 310; 30; 20 INJECTION, SOLUTION INTRAVENOUS at 14:20

## 2023-09-20 RX ADMIN — PROPOFOL 200 MG: 10 INJECTION, EMULSION INTRAVENOUS at 14:24

## 2023-09-20 RX ADMIN — ONDANSETRON 4 MG: 2 INJECTION INTRAMUSCULAR; INTRAVENOUS at 14:29

## 2023-09-20 RX ADMIN — ROPIVACAINE HYDROCHLORIDE 30 ML: 5 INJECTION, SOLUTION EPIDURAL; INFILTRATION; PERINEURAL at 14:00

## 2023-09-20 RX ADMIN — LIDOCAINE HYDROCHLORIDE 60 MG: 20 INJECTION, SOLUTION EPIDURAL; INFILTRATION; INTRACAUDAL; PERINEURAL at 14:24

## 2023-09-20 ASSESSMENT — PAIN - FUNCTIONAL ASSESSMENT: PAIN_FUNCTIONAL_ASSESSMENT: 0-10

## 2023-09-20 NOTE — BRIEF OP NOTE
Brief Postoperative Note      Patient: Marquita Clarke  YOB: 1965  MRN: [id number]    Date of Procedure: 9/20/2023    Pre-Op Diagnosis Codes:     * Traumatic rupture of anterior tibial tendon, right, initial encounter [S86.211A]    Post-Op Diagnosis: Same       Procedure(s):  REPAIR RIGHT ANKLE ANTERIOR TIBIAL TENDON (CHOICE/BLOCK)    Surgeon(s):  Daniela Obrien MD    Assistant:  * No surgical staff found *    Anesthesia: Choice    Estimated Blood Loss (mL): Minimal    Complications: None    Specimens:   * No specimens in log *    Implants:  Implant Name Type Inv.  Item Serial No.  Lot No. LRB No. Used Action   ANCHOR SUT NDL DX FIBERTAK - SNA  ANCHOR SUT NDL DX FIBERTAK NA [employer] [id number] Right 1 Implanted         Drains: * No LDAs found *    Findings: as above      Electronically signed by Marline Harris MD on 9/20/2023 at 3:22 PM

## 2023-09-20 NOTE — ANESTHESIA PRE PROCEDURE
Department of Anesthesiology  Preprocedure Note       Name:  Alexander Pettit   Age:  63 y.o.  :  1965                                          MRN:  [id number]         Date:  2023      Surgeon: Ashley Dixon):  Lydia Estrada MD    Procedure: Procedure(s):  REPAIR RIGHT ANKLE ANTERIOR TIBIAL TENDON (CHOICE/BLOCK)    Medications prior to admission:   Prior to Admission medications    Medication Sig Start Date End Date Taking?  Authorizing Provider   amiodarone (CORDARONE) 200 MG tablet Take 1 tablet by mouth daily 23   Dottie Carbajal MD   fosinopril (MONOPRIL) 40 MG tablet Take 1 tablet by mouth once daily 23   Mariya Paz APRN - NP   apixaban (ELIQUIS) 5 MG TABS tablet Take 1 tablet by mouth 2 times daily 23   Dottie Carbajal MD   aspirin 81 MG EC tablet Take 1 tablet by mouth daily 23   Dottie Carbajal MD   OZEMPIC, 2 MG/DOSE, 8 MG/3ML SOPN INJECT 2 MG SUBCUTANEOUSLY WEEKLY. 3/2/23   Historical Provider, MD   NOVOLOG 100 UNIT/ML injection vial INSULIN PUMP 23   Historical Provider, MD   Continuous Blood Gluc Sensor (FREESTYLE DARCY 14 DAY SENSOR) MISC USE AS DIRECTED EVERY 14 DAYS 23   Historical Provider, MD   acetaminophen (TYLENOL) 500 MG tablet Take by mouth every 6 hours as needed 22   Ar Automatic Reconciliation   Cholecalciferol 50 MCG (2000 UT) CAPS Take 1 capsule by mouth once daily 3/11/21   Ar Automatic Reconciliation   DULoxetine (CYMBALTA) 30 MG extended release capsule Take 1 capsule by mouth daily 12/15/20   Ar Automatic Reconciliation   empagliflozin (JARDIANCE) 25 MG tablet Take by mouth daily 16   Ar Automatic Reconciliation   hydroCHLOROthiazide (HYDRODIURIL) 25 MG tablet Take 1 tablet by mouth once daily 22   Ar Automatic Reconciliation   metFORMIN (GLUCOPHAGE) 1000 MG tablet Take 1 tablet by mouth daily    Ar Automatic Reconciliation   nebivolol (BYSTOLIC) 10 MG tablet daily (before dinner) 23   Ar Automatic Reconciliation

## 2023-09-20 NOTE — DISCHARGE INSTRUCTIONS
No weight right leg  Maintain cast until follow up           Dr. Sigala Melanie Postoperative Patient Instructions    Please maintain the dressing and/or splint placed at surgery until your follow up appointment. We will remove it at your appointment. Please keep the dressing clean and dry. If you have any questions or problems with your dressing, please call our office at [phone]. Please elevate the operative extremity to the level of the heart to keep swelling at a minimum. You may get up to move around, but when seated please keep the extremity elevated as much as possible. This will decrease swelling and postoperative pain. If you were told to be non-weight bearing following surgery, please do not place the foot on the ground. You may use crutches or we can help arrange for a scooter for you to stay off of your operative leg. If you are in a special shoe or boot and told that you may bear weight as tolerated, then you may do so, but please keep the extremity elevated when not moving around. If you had a block from the Anesthesia doctor before your surgery, expect this to wear off around 12-24 hours after you received it and you should start taking your pain medication when the feeling begins to come back into your leg. A prescription for pain medicine is provided. The key to pain control is staying ahead. For the first 48-72 hours after your surgery, you may want to take your pain medication of a regular schedule. After that, you may only need it on an as-needed basis. Please begin taking one Enteric Coated Aspirin 325 mg daily on the morning after your surgery until you are told you do not need to do this anymore. This can lower the risk of developing a blood clot after surgery. If you are not sure if you can take an Aspirin daily, please check with your primary care doctor to verify.   Signs and symptoms of infection include: redness, increased pain, increased swelling not relieved by

## 2023-09-20 NOTE — ANESTHESIA POSTPROCEDURE EVALUATION
Department of Anesthesiology  Postprocedure Note    Patient: Tahira Grimm  MRN: [id number]  YOB: 1965  Date of evaluation: 9/20/2023      Procedure Summary     Date: 09/20/23 Room / Location: [facility] MAIN OR [address] [place] [address] MAIN OR    Anesthesia Start: 1420 Anesthesia Stop: 1519    Procedure: REPAIR RIGHT ANKLE ANTERIOR TIBIAL TENDON (CHOICE/BLOCK) (Right: Foot) Diagnosis:       Traumatic rupture of anterior tibial tendon, right, initial encounter      (Traumatic rupture of anterior tibial tendon, right, initial encounter [W74.014N])    Providers: Marlene Jara MD Responsible Provider: Denisha Evans MD    Anesthesia Type: Regional, General ASA Status: 3          Anesthesia Type: Regional, General    Domi Phase I:      Domi Phase II:        Anesthesia Post Evaluation    Patient location during evaluation: PACU  Patient participation: complete - patient participated  Level of consciousness: awake  Pain score: 2  Airway patency: patent  Nausea & Vomiting: no nausea  Complications: no  Cardiovascular status: blood pressure returned to baseline  Respiratory status: acceptable  Hydration status: euvolemic  Pain management: adequate

## 2023-09-20 NOTE — OP NOTE
antibiotics prior to incision being made. Next, the leg was elevated, tourniquet was inflated. An incision was made at his anterior ankle along the course of the anterior tibial tendon, dissected down through soft tissue and did find a ruptured tendon. It appeared that it avulsed from distally, had retracted a little bit. This distal part of the stump which was proximal had a little bit of a bone fragment in it where I think it had avulsed. There was a scar tissue tract within the sheath along this line. We could follow this down to where the tendon had come from. We decided that we could stretch the tendon all the way back down there and since he more than likely avulsed it, we decided to place a suture anchor. We used mini fluoroscopy as a guide and used an Arthrex suture anchor, placed into the medial cuneiform and then brought the tendon back down while holding the foot dorsiflexed. We were able to tie the tendon back down using the suture anchor to this area and then also repaired this to surrounding soft tissue to aid in the repair. Once this was done, we had good tension across the tendon with appropriately repaired tendon. The whole area was thoroughly irrigated with saline, closed with a Vicryl suture subcuticular layer, nylon sutures in the skin. Sterile dressings were placed and then we placed him into a fiberglass cast holding his foot dorsiflexed. The patient was awoken, taken to recovery room in stable condition.       MD CHARISSE [email or web address]  D:  09/20/2023 15:13  T:  09/20/2023 19:22  JOB #:  [id number]

## 2023-09-20 NOTE — ANESTHESIA PROCEDURE NOTES
Peripheral Block    Patient location during procedure: pre-op  Reason for block: post-op pain management and at surgeon's request  Start time: 9/20/2023 2:00 PM  End time: 9/20/2023 2:15 PM  Staffing  Performed: anesthesiologist   Anesthesiologist: Robert Ayala MD  Performed by: Robert Ayala MD  Authorized by: Robert Ayala MD    Preanesthetic Checklist  Completed: patient identified, IV checked, site marked, risks and benefits discussed, surgical/procedural consents, equipment checked, pre-op evaluation, timeout performed, anesthesia consent given, oxygen available, monitors applied/VS acknowledged and fire risk safety assessment completed and verbalized  Peripheral Block   Patient position: left lateral decubitus  Prep: ChloraPrep  Provider prep: mask and sterile gloves  Patient monitoring: cardiac monitor, continuous pulse ox, frequent blood pressure checks, IV access and oxygen  Block type: Sciatic  Popliteal  Laterality: right  Injection technique: single-shot  Guidance: nerve stimulator and ultrasound guided  Local infiltration: ropivacaine  Infiltration strength: 0.5 %  Local infiltration: ropivacaineDose: 30 mL    Needle   Needle type: insulated echogenic nerve stimulator needle   Needle gauge: 21 G  Needle localization: anatomical landmarks and ultrasound guidance  Needle length: 10 cm  Assessment   Injection assessment: negative aspiration for heme, no paresthesia on injection, local visualized surrounding nerve on ultrasound and no intravascular symptoms  Paresthesia pain: none  Slow fractionated injection: yes  Hemodynamics: stable  Real-time US image taken/store: yes  Outcomes: uncomplicated and patient tolerated procedure well    Medications Administered  ropivacaine (NAROPIN) injection 0.5% - Perineural   30 mL - 9/20/2023 2:00:00 PM

## 2023-10-02 ENCOUNTER — PATIENT MESSAGE (OUTPATIENT)
Age: 58
End: 2023-10-02

## 2023-10-10 RX ORDER — HYDROCHLOROTHIAZIDE 25 MG/1
TABLET ORAL
Qty: 90 TABLET | Refills: 1 | Status: SHIPPED | OUTPATIENT
Start: 2023-10-10

## 2023-10-10 NOTE — TELEPHONE ENCOUNTER
Request for Hydrochlorothiazide 25 mg daily. Last office visit 8/1/23, next office visit 11/8/23. Refills per verbal order from Dr. Zeeshan Tyson.

## 2023-11-02 NOTE — PROGRESS NOTES
[facility]  Cardiac Electrophysiology Clinic Care Note                  []Initial visit     [x]Established visit     Patient Name: Lucero Lee [facility]:[id number]  Primary Cardiologist: Julee Egan MD  Electrophysiologist: Preston Hernandez MD     Reason for visit: H&P update    HPI:  Mr. Margarita Packer is a 63 y.o. male who presents for H&P update prior to upcoming planned ablation of PAF & persistent atrial flutter (scheduled for 11/27/2023). He had initially been scheduled for ablation approx 6 weeks ago, but it was postponed due to need for tendon rupture surgery. He continues amiodarone, reports doing well without known breakthrough arrhythmia. ECG today shows NSR 74 bpm with QTc 416 ms. LVEF 55-60% per ALBA in 06/2023 with mildly dilated LA & mild MR/AR. BP controlled. Anticoagulated with Eliquis, denies bleeding issues. Previous:  S/p DCCV 06/16/2023. LHC in 12/2021 showed ISR of distal LAD, severe apical ISR, severe diffuse disease in diagonal, LCx disease, & moderate RCA disease. Revascularization deferred. S/p AF ablation in 2015. S/p PCI LAD in 2009 & 2011. STEPHANIE on CPAP       Assessment and Plan     PAF/persistent AFL: S/p ablation in 2015 & DCCV in 06/2023. Had recurrence on Multaq, now on amiodarone. ECG today shows NSR 74 bpm, QTc 416 ms. Reviewed risks/benefits of repeat AF ablation. He agrees to proceed as scheduled. Labs ordered. CTA chest already complete. Anticipate stopping amiodarone 3 months post ablation. Should he require for longer periods, he would need TSH/CMP q 6 months, annual eye exam & CXR.    CAD: S/p prior PCI, had 3V disease noted via [address] in 12/2021. No angina. Continue ASA & statin. HTN: BP controlled. Continue current medication regimen. Anticoagulation: Continue Eliquis for embolic CVA prophylaxis. Denies bleeding issues. Follow up in EP clinic post ablation.     Future

## 2023-11-02 NOTE — H&P (VIEW-ONLY)
[facility]  Cardiac Electrophysiology Clinic Care Note                  []Initial visit     [x]Established visit     Patient Name: Scott Knutson - RZZ:[id number]  Primary Cardiologist: Savage Oscar MD  Electrophysiologist: Rocio Rod MD     Reason for visit: H&P update    HPI:  Mr. Niyah Dorman is a 63 y.o. male who presents for H&P update prior to upcoming planned ablation of PAF & persistent atrial flutter (scheduled for 11/27/2023). He had initially been scheduled for ablation approx 6 weeks ago, but it was postponed due to need for tendon rupture surgery. He continues amiodarone, reports doing well without known breakthrough arrhythmia. ECG today shows NSR 74 bpm with QTc 416 ms. LVEF 55-60% per ALBA in 06/2023 with mildly dilated LA & mild MR/AR. BP controlled. Anticoagulated with Eliquis, denies bleeding issues. Previous:  S/p DCCV 06/16/2023. LHC in 12/2021 showed ISR of distal LAD, severe apical ISR, severe diffuse disease in diagonal, LCx disease, & moderate RCA disease. Revascularization deferred. S/p AF ablation in 2015. S/p PCI LAD in 2009 & 2011. STEPHANIE on CPAP       Assessment and Plan     PAF/persistent AFL: S/p ablation in 2015 & DCCV in 06/2023. Had recurrence on Multaq, now on amiodarone. ECG today shows NSR 74 bpm, QTc 416 ms. Reviewed risks/benefits of repeat AF ablation. He agrees to proceed as scheduled. Labs ordered. CTA chest already complete. Anticipate stopping amiodarone 3 months post ablation. Should he require for longer periods, he would need TSH/CMP q 6 months, annual eye exam & CXR.    CAD: S/p prior PCI, had 3V disease noted via [facility] in 12/2021. No angina. Continue ASA & statin. HTN: BP controlled. Continue current medication regimen. Anticoagulation: Continue Eliquis for embolic CVA prophylaxis. Denies bleeding issues. Follow up in EP clinic post ablation.     Future

## 2023-11-09 ENCOUNTER — OFFICE VISIT (OUTPATIENT)
Age: 58
End: 2023-11-09
Payer: COMMERCIAL

## 2023-11-09 VITALS
DIASTOLIC BLOOD PRESSURE: 76 MMHG | RESPIRATION RATE: 18 BRPM | OXYGEN SATURATION: 98 % | HEIGHT: 70 IN | HEART RATE: 76 BPM | SYSTOLIC BLOOD PRESSURE: 136 MMHG | BODY MASS INDEX: 40.2 KG/M2 | WEIGHT: 280.8 LBS

## 2023-11-09 DIAGNOSIS — Z95.5 HISTORY OF CORONARY ARTERY STENT PLACEMENT: ICD-10-CM

## 2023-11-09 DIAGNOSIS — I25.10 CORONARY ARTERY DISEASE INVOLVING NATIVE CORONARY ARTERY OF NATIVE HEART WITHOUT ANGINA PECTORIS: ICD-10-CM

## 2023-11-09 DIAGNOSIS — Z79.01 ANTICOAGULATED: ICD-10-CM

## 2023-11-09 DIAGNOSIS — I48.92 ATRIAL FLUTTER, UNSPECIFIED TYPE (HCC): ICD-10-CM

## 2023-11-09 DIAGNOSIS — Z92.89 HISTORY OF CARDIOVERSION: ICD-10-CM

## 2023-11-09 DIAGNOSIS — I48.0 PAF (PAROXYSMAL ATRIAL FIBRILLATION) (HCC): Primary | ICD-10-CM

## 2023-11-09 DIAGNOSIS — I10 PRIMARY HYPERTENSION: ICD-10-CM

## 2023-11-09 DIAGNOSIS — Z79.899 HIGH RISK MEDICATION USE: ICD-10-CM

## 2023-11-09 LAB
ANION GAP SERPL CALC-SCNC: 8 MMOL/L (ref 5–15)
BUN SERPL-MCNC: 28 MG/DL (ref 6–20)
BUN/CREAT SERPL: 18 (ref 12–20)
CALCIUM SERPL-MCNC: 10.1 MG/DL (ref 8.5–10.1)
CHLORIDE SERPL-SCNC: 100 MMOL/L (ref 97–108)
CO2 SERPL-SCNC: 29 MMOL/L (ref 21–32)
CREAT SERPL-MCNC: 1.59 MG/DL (ref 0.7–1.3)
ERYTHROCYTE [DISTWIDTH] IN BLOOD BY AUTOMATED COUNT: 13.1 % (ref 11.5–14.5)
GLUCOSE SERPL-MCNC: 269 MG/DL (ref 65–100)
HCT VFR BLD AUTO: 51.5 % (ref 36.6–50.3)
HGB BLD-MCNC: 16.7 G/DL (ref 12.1–17)
MCH RBC QN AUTO: 29.1 PG (ref 26–34)
MCHC RBC AUTO-ENTMCNC: 32.4 G/DL (ref 30–36.5)
MCV RBC AUTO: 89.7 FL (ref 80–99)
NRBC # BLD: 0 K/UL (ref 0–0.01)
NRBC BLD-RTO: 0 PER 100 WBC
PLATELET # BLD AUTO: 202 K/UL (ref 150–400)
PMV BLD AUTO: 10.2 FL (ref 8.9–12.9)
POTASSIUM SERPL-SCNC: 4.3 MMOL/L (ref 3.5–5.1)
RBC # BLD AUTO: 5.74 M/UL (ref 4.1–5.7)
SODIUM SERPL-SCNC: 137 MMOL/L (ref 136–145)
WBC # BLD AUTO: 7.5 K/UL (ref 4.1–11.1)

## 2023-11-09 PROCEDURE — 3078F DIAST BP <80 MM HG: CPT | Performed by: NURSE PRACTITIONER

## 2023-11-09 PROCEDURE — 99214 OFFICE O/P EST MOD 30 MIN: CPT | Performed by: NURSE PRACTITIONER

## 2023-11-09 PROCEDURE — 3075F SYST BP GE 130 - 139MM HG: CPT | Performed by: NURSE PRACTITIONER

## 2023-11-09 PROCEDURE — 93000 ELECTROCARDIOGRAM COMPLETE: CPT | Performed by: NURSE PRACTITIONER

## 2023-11-09 ASSESSMENT — PATIENT HEALTH QUESTIONNAIRE - PHQ9
SUM OF ALL RESPONSES TO PHQ9 QUESTIONS 1 & 2: 0
SUM OF ALL RESPONSES TO PHQ QUESTIONS 1-9: 0
1. LITTLE INTEREST OR PLEASURE IN DOING THINGS: 0
2. FEELING DOWN, DEPRESSED OR HOPELESS: 0
SUM OF ALL RESPONSES TO PHQ QUESTIONS 1-9: 0

## 2023-11-09 NOTE — PROGRESS NOTES
Room #: 5    No complaints. Chief Complaint   Patient presents with    Follow-up    Atrial Fibrillation       Vitals:    11/09/23 0903   BP: 136/76   Site: Left Upper Arm   Position: Sitting   Cuff Size: Large Adult   Pulse: 76   Resp: 18   SpO2: 98%   Weight: 127.4 kg (280 lb 12.8 oz)   Height: 1.778 m (5' 10\")         Chest pain:  NO  Shortness of breath:  NO  Edema: NO  Palpitations, skipped beats, rapid heartbeat:  NO  Dizziness:  NO    1. Have you been to the ER, urgent care clinic since your last visit? Hospitalized since your last visit? NO    2. Have you seen or consulted any other health care providers outside of the [address] [place] [address] since your last visit? Include any pap smears or colon screening.  NO      Refills:  NO

## 2023-11-21 ENCOUNTER — PREP FOR PROCEDURE (OUTPATIENT)
Age: 58
End: 2023-11-21

## 2023-11-25 RX ORDER — SODIUM CHLORIDE 0.9 % (FLUSH) 0.9 %
5-40 SYRINGE (ML) INJECTION PRN
Status: CANCELLED | OUTPATIENT
Start: 2023-11-25

## 2023-11-25 RX ORDER — SODIUM CHLORIDE 9 MG/ML
INJECTION, SOLUTION INTRAVENOUS PRN
Status: CANCELLED | OUTPATIENT
Start: 2023-11-25

## 2023-11-25 RX ORDER — SODIUM CHLORIDE 0.9 % (FLUSH) 0.9 %
5-40 SYRINGE (ML) INJECTION EVERY 12 HOURS SCHEDULED
Status: CANCELLED | OUTPATIENT
Start: 2023-11-25

## 2023-11-27 ENCOUNTER — ANESTHESIA (OUTPATIENT)
Facility: HOSPITAL | Age: 58
End: 2023-11-27
Payer: COMMERCIAL

## 2023-11-27 ENCOUNTER — ANESTHESIA EVENT (OUTPATIENT)
Facility: HOSPITAL | Age: 58
End: 2023-11-27
Payer: COMMERCIAL

## 2023-11-27 ENCOUNTER — HOSPITAL ENCOUNTER (OUTPATIENT)
Facility: HOSPITAL | Age: 58
Discharge: HOME | End: 2023-11-27
Attending: INTERNAL MEDICINE | Admitting: INTERNAL MEDICINE
Payer: COMMERCIAL

## 2023-11-27 ENCOUNTER — HOSPITAL ENCOUNTER (OUTPATIENT)
Facility: HOSPITAL | Age: 58
Discharge: HOME | End: 2023-11-29
Attending: INTERNAL MEDICINE
Payer: COMMERCIAL

## 2023-11-27 VITALS
DIASTOLIC BLOOD PRESSURE: 57 MMHG | SYSTOLIC BLOOD PRESSURE: 112 MMHG | TEMPERATURE: 98.7 F | HEART RATE: 84 BPM | WEIGHT: 275 LBS | BODY MASS INDEX: 39.37 KG/M2 | HEIGHT: 70 IN | RESPIRATION RATE: 17 BRPM | OXYGEN SATURATION: 94 %

## 2023-11-27 DIAGNOSIS — I48.91 ATRIAL FIBRILLATION, UNSPECIFIED TYPE (HCC): ICD-10-CM

## 2023-11-27 LAB
ABO + RH BLD: NORMAL
ACT BLD: 191 SECS (ref 79–138)
ACT BLD: 245 SECS (ref 79–138)
BLOOD GROUP ANTIBODIES SERPL: NORMAL
GLUCOSE BLD STRIP.AUTO-MCNC: 229 MG/DL (ref 65–117)
SERVICE CMNT-IMP: ABNORMAL
SPECIMEN EXP DATE BLD: NORMAL

## 2023-11-27 PROCEDURE — C1760 CLOSURE DEV, VASC: HCPCS | Performed by: INTERNAL MEDICINE

## 2023-11-27 PROCEDURE — 2720000010 HC SURG SUPPLY STERILE: Performed by: INTERNAL MEDICINE

## 2023-11-27 PROCEDURE — 93655 ICAR CATH ABLTJ DSCRT ARRHYT: CPT | Performed by: INTERNAL MEDICINE

## 2023-11-27 PROCEDURE — C1733 CATH, EP, OTHR THAN COOL-TIP: HCPCS | Performed by: INTERNAL MEDICINE

## 2023-11-27 PROCEDURE — 82962 GLUCOSE BLOOD TEST: CPT

## 2023-11-27 PROCEDURE — 93656 COMPRE EP EVAL ABLTJ ATR FIB: CPT | Performed by: INTERNAL MEDICINE

## 2023-11-27 PROCEDURE — C1894 INTRO/SHEATH, NON-LASER: HCPCS | Performed by: INTERNAL MEDICINE

## 2023-11-27 PROCEDURE — 36415 COLL VENOUS BLD VENIPUNCTURE: CPT

## 2023-11-27 PROCEDURE — C1766 INTRO/SHEATH,STRBLE,NON-PEEL: HCPCS | Performed by: INTERNAL MEDICINE

## 2023-11-27 PROCEDURE — 93613 INTRACARDIAC EPHYS 3D MAPG: CPT | Performed by: INTERNAL MEDICINE

## 2023-11-27 PROCEDURE — 6360000002 HC RX W HCPCS: Performed by: NURSE ANESTHETIST, CERTIFIED REGISTERED

## 2023-11-27 PROCEDURE — 2580000003 HC RX 258: Performed by: NURSE ANESTHETIST, CERTIFIED REGISTERED

## 2023-11-27 PROCEDURE — 93312 ECHO TRANSESOPHAGEAL: CPT | Performed by: INTERNAL MEDICINE

## 2023-11-27 PROCEDURE — 93662 INTRACARDIAC ECG (ICE): CPT | Performed by: INTERNAL MEDICINE

## 2023-11-27 PROCEDURE — 93312 ECHO TRANSESOPHAGEAL: CPT

## 2023-11-27 PROCEDURE — 86901 BLOOD TYPING SEROLOGIC RH(D): CPT

## 2023-11-27 PROCEDURE — C1730 CATH, EP, 19 OR FEW ELECT: HCPCS | Performed by: INTERNAL MEDICINE

## 2023-11-27 PROCEDURE — 86850 RBC ANTIBODY SCREEN: CPT

## 2023-11-27 PROCEDURE — 2580000003 HC RX 258: Performed by: INTERNAL MEDICINE

## 2023-11-27 PROCEDURE — 3700000001 HC ADD 15 MINUTES (ANESTHESIA): Performed by: INTERNAL MEDICINE

## 2023-11-27 PROCEDURE — 2709999900 HC NON-CHARGEABLE SUPPLY: Performed by: INTERNAL MEDICINE

## 2023-11-27 PROCEDURE — 93657 TX L/R ATRIAL FIB ADDL: CPT | Performed by: INTERNAL MEDICINE

## 2023-11-27 PROCEDURE — 93325 DOPPLER ECHO COLOR FLOW MAPG: CPT | Performed by: INTERNAL MEDICINE

## 2023-11-27 PROCEDURE — 3700000000 HC ANESTHESIA ATTENDED CARE: Performed by: INTERNAL MEDICINE

## 2023-11-27 PROCEDURE — 93325 DOPPLER ECHO COLOR FLOW MAPG: CPT

## 2023-11-27 PROCEDURE — 2500000003 HC RX 250 WO HCPCS: Performed by: NURSE ANESTHETIST, CERTIFIED REGISTERED

## 2023-11-27 PROCEDURE — 76937 US GUIDE VASCULAR ACCESS: CPT | Performed by: INTERNAL MEDICINE

## 2023-11-27 PROCEDURE — 93320 DOPPLER ECHO COMPLETE: CPT | Performed by: INTERNAL MEDICINE

## 2023-11-27 PROCEDURE — C1759 CATH, INTRA ECHOCARDIOGRAPHY: HCPCS | Performed by: INTERNAL MEDICINE

## 2023-11-27 PROCEDURE — 86900 BLOOD TYPING SEROLOGIC ABO: CPT

## 2023-11-27 PROCEDURE — 85347 COAGULATION TIME ACTIVATED: CPT

## 2023-11-27 RX ORDER — SODIUM CHLORIDE 0.9 % (FLUSH) 0.9 %
5-40 SYRINGE (ML) INJECTION PRN
Status: DISCONTINUED | OUTPATIENT
Start: 2023-11-27 | End: 2023-11-27 | Stop reason: HOSPADM

## 2023-11-27 RX ORDER — HEPARIN SODIUM 10000 [USP'U]/100ML
INJECTION, SOLUTION INTRAVENOUS CONTINUOUS PRN
Status: DISCONTINUED | OUTPATIENT
Start: 2023-11-27 | End: 2023-11-27 | Stop reason: SDUPTHER

## 2023-11-27 RX ORDER — ACETAMINOPHEN 325 MG/1
650 TABLET ORAL EVERY 4 HOURS PRN
Status: DISCONTINUED | OUTPATIENT
Start: 2023-11-27 | End: 2023-11-27 | Stop reason: HOSPADM

## 2023-11-27 RX ORDER — EPHEDRINE SULFATE 50 MG/ML
INJECTION INTRAVENOUS PRN
Status: DISCONTINUED | OUTPATIENT
Start: 2023-11-27 | End: 2023-11-27 | Stop reason: SDUPTHER

## 2023-11-27 RX ORDER — ONDANSETRON 2 MG/ML
INJECTION INTRAMUSCULAR; INTRAVENOUS PRN
Status: DISCONTINUED | OUTPATIENT
Start: 2023-11-27 | End: 2023-11-27 | Stop reason: SDUPTHER

## 2023-11-27 RX ORDER — DEXAMETHASONE SODIUM PHOSPHATE 4 MG/ML
INJECTION, SOLUTION INTRA-ARTICULAR; INTRALESIONAL; INTRAMUSCULAR; INTRAVENOUS; SOFT TISSUE PRN
Status: DISCONTINUED | OUTPATIENT
Start: 2023-11-27 | End: 2023-11-27 | Stop reason: SDUPTHER

## 2023-11-27 RX ORDER — LIDOCAINE HYDROCHLORIDE 20 MG/ML
INJECTION, SOLUTION EPIDURAL; INFILTRATION; INTRACAUDAL; PERINEURAL PRN
Status: DISCONTINUED | OUTPATIENT
Start: 2023-11-27 | End: 2023-11-27 | Stop reason: SDUPTHER

## 2023-11-27 RX ORDER — PROPOFOL 10 MG/ML
INJECTION, EMULSION INTRAVENOUS PRN
Status: DISCONTINUED | OUTPATIENT
Start: 2023-11-27 | End: 2023-11-27 | Stop reason: SDUPTHER

## 2023-11-27 RX ORDER — PROTAMINE SULFATE 10 MG/ML
INJECTION, SOLUTION INTRAVENOUS PRN
Status: DISCONTINUED | OUTPATIENT
Start: 2023-11-27 | End: 2023-11-27 | Stop reason: SDUPTHER

## 2023-11-27 RX ORDER — ONDANSETRON 2 MG/ML
4 INJECTION INTRAMUSCULAR; INTRAVENOUS EVERY 6 HOURS PRN
Status: DISCONTINUED | OUTPATIENT
Start: 2023-11-27 | End: 2023-11-27 | Stop reason: HOSPADM

## 2023-11-27 RX ORDER — HEPARIN SODIUM 1000 [USP'U]/ML
INJECTION, SOLUTION INTRAVENOUS; SUBCUTANEOUS PRN
Status: DISCONTINUED | OUTPATIENT
Start: 2023-11-27 | End: 2023-11-27 | Stop reason: SDUPTHER

## 2023-11-27 RX ORDER — SODIUM CHLORIDE 9 MG/ML
INJECTION, SOLUTION INTRAVENOUS PRN
Status: DISCONTINUED | OUTPATIENT
Start: 2023-11-27 | End: 2023-11-27 | Stop reason: HOSPADM

## 2023-11-27 RX ORDER — PHENYLEPHRINE HCL IN 0.9% NACL 0.4MG/10ML
SYRINGE (ML) INTRAVENOUS PRN
Status: DISCONTINUED | OUTPATIENT
Start: 2023-11-27 | End: 2023-11-27 | Stop reason: SDUPTHER

## 2023-11-27 RX ORDER — ROCURONIUM BROMIDE 10 MG/ML
INJECTION, SOLUTION INTRAVENOUS PRN
Status: DISCONTINUED | OUTPATIENT
Start: 2023-11-27 | End: 2023-11-27 | Stop reason: SDUPTHER

## 2023-11-27 RX ORDER — SODIUM CHLORIDE 0.9 % (FLUSH) 0.9 %
5-40 SYRINGE (ML) INJECTION EVERY 12 HOURS SCHEDULED
Status: DISCONTINUED | OUTPATIENT
Start: 2023-11-27 | End: 2023-11-27 | Stop reason: HOSPADM

## 2023-11-27 RX ORDER — SUCCINYLCHOLINE/SOD CL,ISO/PF 200MG/10ML
SYRINGE (ML) INTRAVENOUS PRN
Status: DISCONTINUED | OUTPATIENT
Start: 2023-11-27 | End: 2023-11-27 | Stop reason: SDUPTHER

## 2023-11-27 RX ORDER — VASOPRESSIN 20 U/ML
INJECTION PARENTERAL PRN
Status: DISCONTINUED | OUTPATIENT
Start: 2023-11-27 | End: 2023-11-27 | Stop reason: SDUPTHER

## 2023-11-27 RX ORDER — FENTANYL CITRATE 50 UG/ML
INJECTION, SOLUTION INTRAMUSCULAR; INTRAVENOUS PRN
Status: DISCONTINUED | OUTPATIENT
Start: 2023-11-27 | End: 2023-11-27 | Stop reason: SDUPTHER

## 2023-11-27 RX ORDER — HYDROCODONE BITARTRATE AND ACETAMINOPHEN 5; 325 MG/1; MG/1
1 TABLET ORAL EVERY 6 HOURS PRN
Status: DISCONTINUED | OUTPATIENT
Start: 2023-11-27 | End: 2023-11-27 | Stop reason: HOSPADM

## 2023-11-27 RX ADMIN — PROPOFOL 300 MG: 10 INJECTION, EMULSION INTRAVENOUS at 10:42

## 2023-11-27 RX ADMIN — Medication 120 MCG: at 10:58

## 2023-11-27 RX ADMIN — FENTANYL CITRATE 25 MCG: 50 INJECTION, SOLUTION INTRAMUSCULAR; INTRAVENOUS at 12:35

## 2023-11-27 RX ADMIN — HEPARIN SODIUM 10000 UNITS: 1000 INJECTION, SOLUTION INTRAVENOUS; SUBCUTANEOUS at 11:17

## 2023-11-27 RX ADMIN — LIDOCAINE HYDROCHLORIDE 60 MG: 20 INJECTION, SOLUTION EPIDURAL; INFILTRATION; INTRACAUDAL; PERINEURAL at 10:42

## 2023-11-27 RX ADMIN — PROPOFOL 50 MG: 10 INJECTION, EMULSION INTRAVENOUS at 10:52

## 2023-11-27 RX ADMIN — HEPARIN SODIUM 5000 UNITS: 1000 INJECTION, SOLUTION INTRAVENOUS; SUBCUTANEOUS at 12:00

## 2023-11-27 RX ADMIN — PROTAMINE SULFATE 50 MG: 10 INJECTION, SOLUTION INTRAVENOUS at 12:15

## 2023-11-27 RX ADMIN — PHENYLEPHRINE HYDROCHLORIDE 40 MCG/MIN: 10 INJECTION INTRAVENOUS at 10:45

## 2023-11-27 RX ADMIN — EPHEDRINE SULFATE 10 MG: 50 INJECTION INTRAVENOUS at 11:00

## 2023-11-27 RX ADMIN — SODIUM CHLORIDE: 9 INJECTION, SOLUTION INTRAVENOUS at 10:15

## 2023-11-27 RX ADMIN — VASOPRESSIN 2 UNITS: 20 INJECTION INTRAVENOUS at 11:05

## 2023-11-27 RX ADMIN — VASOPRESSIN 2 UNITS: 20 INJECTION INTRAVENOUS at 11:15

## 2023-11-27 RX ADMIN — ROCURONIUM BROMIDE 10 MG: 10 INJECTION, SOLUTION INTRAVENOUS at 10:42

## 2023-11-27 RX ADMIN — DEXAMETHASONE SODIUM PHOSPHATE 8 MG: 4 INJECTION INTRA-ARTICULAR; INTRALESIONAL; INTRAMUSCULAR; INTRAVENOUS; SOFT TISSUE at 10:48

## 2023-11-27 RX ADMIN — EPHEDRINE SULFATE 10 MG: 50 INJECTION INTRAVENOUS at 11:15

## 2023-11-27 RX ADMIN — HEPARIN SODIUM 18 UNITS/KG/HR: 10000 INJECTION, SOLUTION INTRAVENOUS at 11:17

## 2023-11-27 RX ADMIN — EPHEDRINE SULFATE 10 MG: 50 INJECTION INTRAVENOUS at 11:45

## 2023-11-27 RX ADMIN — VASOPRESSIN 2 UNITS: 20 INJECTION INTRAVENOUS at 11:07

## 2023-11-27 RX ADMIN — EPHEDRINE SULFATE 10 MG: 50 INJECTION INTRAVENOUS at 11:02

## 2023-11-27 RX ADMIN — HEPARIN SODIUM 8000 UNITS: 1000 INJECTION, SOLUTION INTRAVENOUS; SUBCUTANEOUS at 11:43

## 2023-11-27 RX ADMIN — FENTANYL CITRATE 50 MCG: 50 INJECTION, SOLUTION INTRAMUSCULAR; INTRAVENOUS at 10:52

## 2023-11-27 RX ADMIN — EPHEDRINE SULFATE 10 MG: 50 INJECTION INTRAVENOUS at 10:59

## 2023-11-27 RX ADMIN — ONDANSETRON 4 MG: 2 INJECTION INTRAMUSCULAR; INTRAVENOUS at 12:15

## 2023-11-27 RX ADMIN — Medication 200 MG: at 10:42

## 2023-11-27 RX ADMIN — VASOPRESSIN 4 UNITS: 20 INJECTION INTRAVENOUS at 11:24

## 2023-11-27 RX ADMIN — VASOPRESSIN 5 UNITS: 20 INJECTION INTRAVENOUS at 11:37

## 2023-11-27 NOTE — INTERVAL H&P NOTE
Update History & Physical    The patient's History and Physical of November 9, 2023 was reviewed with the patient and I examined the patient. There was no change. The surgical site was confirmed by the patient and me. Plan: The risks, benefits, expected outcome, and alternative to the recommended procedure have been discussed with the patient. Patient understands and wants to proceed with the procedure.      Electronically signed by Lesly Marcelo MD on 11/27/2023 at 10:21 AM

## 2023-11-27 NOTE — PROGRESS NOTES
1340:  Head of bed elevated 30degrees right and left groin site without bleeding and no hematoma. 1440:  Discharge instructions explained to So Almendarez, all questions answered, and patient verbalized understanding. 1446:  So Almendarez ambulated @ 0929 (time) approximately 20 feet. Patient tolerated ambulation without adverse advents. Right and left groin (right/left, groin/arm)  without bleeding or hematoma noted. 1531: Copy of discharge instructions given to patient. Patient taken out via wheelchair Patient discharged to the care of wife.

## 2023-11-27 NOTE — PROGRESS NOTES
EP LAB to Recovery Room Report    Procedure: ALBA, Cryo A. Fib and RF A. Flutter Ablation    MD: Katie    Pre-procedure heart rhythm: NSR  Verbal Report given to Recovery Nurse on patient being transferred to Recovery Room for routine post-op. Patient stable upon transfer to . Pt had General anesthesia. Vitals, mental status, MAR, procedural summary discussed with recovery RN. A total of Heparin 53816 units given as boluses in addition to heparin gtt. Protamine 50mg given post ablation. Post-procedure heart rhythm: NSR    Sheaths:  1233hrs: Right femoral vein 15fr sheath removed, closed with Perclose. 1232hrs: Left femoral vein 6fr sheath removed, closed with Vascade MVP.  1237hrs: Left femoral vein 11fr sheath removed, closed with Perclose.

## 2023-11-27 NOTE — PROGRESS NOTES
Cardiac Cath Lab Recovery Arrival Note:      Maxwell Bradford arrived to Cardiac Cath Lab, Recovery Area. Staff introduced to patient. Patient identifiers verified with NAME and DATE OF BIRTH. Procedure verified with patient. Consent forms reviewed and signed by patient or authorized representative and verified. Allergies verified. Patient informed of procedure and plan of care. Questions answered with review. Patient prepped for procedure, per orders from physician, prior to arrival.    Patient on cardiac monitor, non-invasive blood pressure, SPO2 monitor. Patient is A&Ox 4. Patient reports no complaints. Patient in stretcher, in low position, with side rails up, call bell within reach, patient instructed to call of assistance as needed. Patient prep in: [facility] Recovery Area, Bed# FT. Family in: waiting room. Prep by: CHARITO Kumar

## 2023-11-27 NOTE — PROGRESS NOTES
Cardiac Cath Lab Procedure Area Arrival Note:    Marquita Clarke arrived to Cardiac Cath Lab, Procedure Area. Patient identifiers verified with NAME and DATE OF BIRTH. Procedure verified with patient. Consent forms verified. Allergies verified. Patient informed of procedure and plan of care. Questions answered with review. Patient voiced understanding of procedure and plan of care. Patient on cardiac monitor, non-invasive blood pressure, SPO2 monitor. On stretcher to room 3 for a ALBA and EP study with an ablation of a fib and Flutter under general anesthesia. Patient status doing well without problems. Patient is A&Ox 4. Patient reports no com[plaints. Patient medicated during procedure with orders obtained and verified by Dr. Edward Balderrama. Refer to patients Cardiac Cath Lab PROCEDURE REPORT for vital signs, assessment, status, and response during procedure, printed at end of case. Printed report on chart or scanned into chart.

## 2023-11-27 NOTE — ANESTHESIA POSTPROCEDURE EVALUATION
Department of Anesthesiology  Postprocedure Note    Patient: Devon Agustin  MRN: [id number]  YOB: 1965  Date of evaluation: 11/27/2023      Procedure Summary     Date: 11/27/23 Room / Location: [facility] CATH LAB 3 / [facility] CARDIAC CATH LAB    Anesthesia Start: 1032 Anesthesia Stop: 8775    Procedures:       Ablation A-fib w complete ep study      Ablation cryoablation EP      Ep 3d mapping      Intracardiac echocardiogram      Ultrasound guided vascular access      Ablation following A-fib addl Diagnosis:       Atrial fibrillation, unspecified type ([address])      (Atrial fibrillation, unspecified type ([address]) [I48.91])    Providers: Zuleyka Magdaleno MD Responsible Provider: Kat Del Cid DO    Anesthesia Type: General ASA Status: 3          Anesthesia Type: General    Domi Phase I: Domi Score: 8    Domi Phase II:        Anesthesia Post Evaluation    Patient location during evaluation: PACU  Patient participation: complete - patient participated  Level of consciousness: awake and alert  Pain score: 0  Airway patency: patent  Nausea & Vomiting: no nausea  Complications: no  Cardiovascular status: hemodynamically stable  Respiratory status: acceptable  Hydration status: euvolemic  Comments: Seen, no complaints   Pain management: adequate

## 2023-11-27 NOTE — PROCEDURES
Cardiac Procedure Note   Patient: Coral Dao  MRN: [id number]  SSN: [id number]   YOB: 1965 Age: 63 y.o.   Sex: male    Date of Procedure: 11/27/2023   Pre-procedure Diagnosis: Atrial Fibrillation/Atrial Flutter  Post-procedure Diagnosis: same  Procedure: ALBA, EP Study, and Ablation Atrial fibrillation and atrial flutter  :  Dr. Nestor Grimm MD    Assistant(s):  None  Anesthesia: general  Estimated Blood Loss: Less than 10 mL   Specimens Removed: None  Findings:   ALBA no thrombus  Cryo ablation all 4 PVI  CTI AFL ablation   Complications: None   Implants:  None  Signed by:  Nestor Grimm MD  11/27/2023  1:16 PM

## 2023-11-27 NOTE — DISCHARGE INSTRUCTIONS
Patient Instructions Post-EP Study and Ablation    Hold amiodarone. Do not take any more   Resume eliquis tomorrow am    1. No heavy lifting or exercises for 1 week. This includes the following:  Do not push or move furniture, jog or run    2. Do not drive for 2 days. 3.  Call Dr. Thompson Westbrook at [phone] if you experience any of the following symptoms:  Dizziness, lightheadedness, fainting spells  Lack of energy  Shortness of breath  Rapid heart rate  Chest or muscle twitches  Blurry vision, double vision, weakness, numbness  Nausea, vomiting  Fever  Bleeding in the stool, black stool  Leg swelling, pain    4. Follow-up with Dr. Chente Simmons NP as noted below. Future Appointments   Date Time Provider [address] [place] [address]   11/27/2023  9:30 AM Verna Castillo,[address] [facility] [address] Castillo,[address]   12/14/2023  9:00 AM WAYLON Moon NP CAVZIA Pollock M.D.   Electrophysiology/Cardiology  [facility]  [facility], [address] [place] [address]                               [phone]

## 2023-11-29 LAB
ECHO BSA: 2.48 M2
ECHO BSA: 2.48 M2

## 2023-12-07 RX ORDER — FOSINOPRIL SODIUM 40 MG/1
TABLET ORAL
Qty: 90 TABLET | Refills: 2 | Status: SHIPPED | OUTPATIENT
Start: 2023-12-07

## 2023-12-07 NOTE — TELEPHONE ENCOUNTER
Med refilled per VO by MD.      Future Appointments   Date Time Provider [address]   12/14/2023  9:00 AM WAYLON Hopkins - ADEN [facility] BS AMB

## 2023-12-11 RX ORDER — FOSINOPRIL SODIUM 40 MG/1
TABLET ORAL
Qty: 90 TABLET | Refills: 0 | OUTPATIENT
Start: 2023-12-11

## 2023-12-14 ENCOUNTER — OFFICE VISIT (OUTPATIENT)
Age: 58
End: 2023-12-14

## 2023-12-14 VITALS
HEIGHT: 70 IN | SYSTOLIC BLOOD PRESSURE: 128 MMHG | DIASTOLIC BLOOD PRESSURE: 64 MMHG | RESPIRATION RATE: 18 BRPM | WEIGHT: 281.2 LBS | OXYGEN SATURATION: 97 % | BODY MASS INDEX: 40.26 KG/M2 | HEART RATE: 87 BPM

## 2023-12-14 DIAGNOSIS — I48.3 TYPICAL ATRIAL FLUTTER (HCC): ICD-10-CM

## 2023-12-14 DIAGNOSIS — I10 PRIMARY HYPERTENSION: ICD-10-CM

## 2023-12-14 DIAGNOSIS — I25.10 CORONARY ARTERY DISEASE INVOLVING NATIVE CORONARY ARTERY OF NATIVE HEART WITHOUT ANGINA PECTORIS: ICD-10-CM

## 2023-12-14 DIAGNOSIS — Z86.79 S/P ABLATION OF ATRIAL FIBRILLATION: ICD-10-CM

## 2023-12-14 DIAGNOSIS — Z79.01 ANTICOAGULATED: ICD-10-CM

## 2023-12-14 DIAGNOSIS — Z98.890 S/P ABLATION OF ATRIAL FLUTTER: ICD-10-CM

## 2023-12-14 DIAGNOSIS — Z98.890 S/P ABLATION OF ATRIAL FIBRILLATION: ICD-10-CM

## 2023-12-14 DIAGNOSIS — Z95.5 HISTORY OF CORONARY ARTERY STENT PLACEMENT: ICD-10-CM

## 2023-12-14 DIAGNOSIS — Z86.79 S/P ABLATION OF ATRIAL FLUTTER: ICD-10-CM

## 2023-12-14 DIAGNOSIS — I48.0 PAF (PAROXYSMAL ATRIAL FIBRILLATION) (HCC): Primary | ICD-10-CM

## 2023-12-14 RX ORDER — TIRZEPATIDE 7.5 MG/.5ML
7.5 INJECTION, SOLUTION SUBCUTANEOUS WEEKLY
COMMUNITY
Start: 2023-12-13

## 2023-12-14 ASSESSMENT — PATIENT HEALTH QUESTIONNAIRE - PHQ9
SUM OF ALL RESPONSES TO PHQ QUESTIONS 1-9: 0
SUM OF ALL RESPONSES TO PHQ QUESTIONS 1-9: 0
2. FEELING DOWN, DEPRESSED OR HOPELESS: 0
SUM OF ALL RESPONSES TO PHQ QUESTIONS 1-9: 0
SUM OF ALL RESPONSES TO PHQ9 QUESTIONS 1 & 2: 0
1. LITTLE INTEREST OR PLEASURE IN DOING THINGS: 0
SUM OF ALL RESPONSES TO PHQ QUESTIONS 1-9: 0

## 2023-12-28 RX ORDER — NEBIVOLOL 10 MG/1
10 TABLET ORAL DAILY
Qty: 90 TABLET | Refills: 1 | Status: SHIPPED | OUTPATIENT
Start: 2023-12-28

## 2023-12-28 NOTE — TELEPHONE ENCOUNTER
Request for nebivolol 10 mg. Last office visit 12/14/23, next office visit 7/3/24.  Refills per verbal order from Ivan Davalos NP.

## 2024-01-26 NOTE — PROGRESS NOTES
[facility]                                                            OFFICE NOTE        Vika Greene M.D., F.A.C.C. Elna Phalen   1965  [id number]    Date/Time:  4/28/202211:35 AM            SUBJECTIVE:  He is doing very well status post the right knee replacement. He denies any chest pain shortness of breath palpitation presyncopal syncopal episodes. He has started to increase his activities with no issues after his total knee replacement. Assessment/Plan    1. CAD: He remains completely asymptomatic and active. His last electrocardiogram February 2022 showed normal sinus rhythm with no ischemic changes. He has undergone cardiac catheterization in December 2021 with some residual stenosis in the LAD and RCA but not significant off to warrant intervention at this time. It has been a long time since her last PCI discussed options concerning Plavix. Stop Plavix continue aspirin 162 mg daily. Continue Crestor and diltiazem at this time. 2.  Paroxysmal atrial fibrillation: He remains in normal sinus rhythm. Status post atrial fibrillation ablation in September 2015. Currently off Eliquis. Continue aspirin alone for now. 3.  Aortic stenosis: Mild to moderate by last transthoracic echocardiogram repeat in 1 year. 4.  Coronary artery stenosis: Mild to moderate as of study also able to thousand 20 2 repeat ultrasound in 1 year. 5.  Hypertension: Well controlled. 6.  Hyperlipidemia: Well-controlled as far as cholesterol continue same dose of Crestor. History glycerides remain elevated continue Vascepaisha will defer to his primary care physician addressing diabetes which is I suspect the main responsible for the increase in triglycerides. We have discussed nutrition and weight loss.     He is obesity is moderate at least.    7.  Struct of sleep apnea: Continue Orders placed CPAP.    8.  Diabetes: Aspects primary care physician. otherwise see him back in 6 months or sooner if any question problems arise prior to that        HPI   48 y. o. male. history of HTN , HLP, CAD/MI in 2008. S/p PCI LAD in 2008 and additional stent in LAD in 2011. He also has residual 70% RV branch stenosis.     Admitted to [facility] with AF and RVR on 7/15 nuclear stress test at that time with no ischemia, apical attenuation ( sub endo mi) and EF 54%. Echo also on 7/15: EF 55-60% no rwma,  bicuspid AV and mild AS     Admitted to [facility] on 7/15 for recurrent AF. DOLORES showed no NEWTON, normal LV and tri leaflet AV with possible partial fusion of 2 leaflets, mild AS, AI and MR . He failed cardioversion with persistent AF   successful dc cardioversion on 7/15 while on multaq     Recurrent AF afterwards on 7/15  and eventually AF ablation with Dr. Gena Angelo on 9/15     Stopped toprol on his own accord on 11/15 on account of excessive sob     He has GB disease and stones 2015  Echo on 8/16:Left ventricle: Size was normal. Systolic function was normal by visual  assessment. Ejection fraction was estimated to be 60 %. There were no  regional wall motion abnormalities. Wall thickness was mildly increased. Aortic valve: There was an apparent echogenic mass of tissue appearing in  the outflow tract adherent to the right coronary cusp; unknown if  clinically significant. Leaflets exhibited moderately increased thickness,  moderate calcification, and mildly reduced cuspal separation. Transaortic  velocity was increased due to valvular stenosis. There was mild stenosis. VIRA was 2.0 cm2, peak/mean gradients were 29/17 mmHg, and the DI was 0.45. There was mild regurgitation. Aorta, systemic arteries:  The root exhibited normal size; however, the  ascending aorta was mildly enlarged; sinus of valsalva was 3.8 cm, the  ST-J was 3.5 cm, and the ascending aorta was 4.0 cm.     ER visit for cp on 5/17 normal w/u but elevated BP     NUCLEAR STRESS TEST on 6/2/17 no gross ischemia fixed apical and infero apical defect EF 60%      ECHO on 5/17: EF 60% mild AS mean gradient of 14 mmhg mild IN            CARDIAC STUDIES        11/23/21    ECHO ADULT COMPLETE 11/24/2021 11/24/2021    Interpretation Summary  · LV: Estimated LVEF is 60 - 65%. Visually measured ejection fraction. Normal cavity size and systolic function (ejection fraction normal). Moderate concentric hypertrophy. Mild (grade 1) left ventricular diastolic dysfunction. · LA: Mildly dilated left atrium. · AV: Probably trileaflet aortic valve. Moderate aortic valve leaflet calcification present with reduced excursion. Aortic valve mean gradient is 16 mmHg. Aortic valve area is 1.4 cm2. Mild to moderate aortic valve stenosis is present. Mild to moderate aortic valve regurgitation is present. · Image quality for this study was technically difficult. · Contrast used: DEFINITY. Signed by: Nicolasa Cid MD on 11/24/2021  1:56 PM            11/23/21    NUCLEAR CARDIAC STRESS TEST 11/23/2021 11/24/2021    Interpretation Summary  · SPECT: Left ventricular function post-stress was normal. Calculated ejection fraction is 63% (normal EF value is equal to or greater than 50%). Left ventricular wall motion was normal at stress, no regional wall motion abnormality noted. There is no evidence of transient ischemic dilation (TID). The TID ratio is 1.06.  · Baseline ECG: Normal sinus rhythm, rsr.  · SPECT: Myocardial perfusion imaging defect 1: There is a defect that is small to medium in size present in the apical segment(s) of the anterior wall(s) that is partially reversible. There is normal wall motion in the defect area. Viability in the area is good. The defect appears to probably be ischemia.   · SPECT: Left ventricular perfusion is abnormal. Myocardial perfusion imaging supports an intermediate risk stress test.  · Mixed infarction and ischemia in a small to medium area of distal anterior wall and apex  · EF preserved at 63%    Signed by: Kristi Rhoades MD on 11/23/2021  4:58 PM            12/10/21    CARDIAC PROCEDURE 12/11/2021 12/11/2021    Conclusion  · Relatively stable mild to moderate disease of a calcified proximal LAD compared to cardiac catheterization in 2013.   · Mid into distal LAD with a long segment of previously placed stent that shows mild to moderate in-stent restenosis with severe in-stent restenosis at the very apical aspect of previously placed stent  · Focal 70% stenosis in distal right coronary artery  · Normal LVEDP    Access: Right radial artery, 6F  Catheters:  Left coronary: JL 3.5, 5F  Right coronary: JR4, 5F    Findings:  L Main: Large-caliber, minimal disease  LAD: Large caliber vessel, calcified, proximal into mid vessel with diffuse mild to moderate disease with mild progression compared to cardiac catheterization in 2013, mid into the distal LAD with a long segment of previously placed stent showing diffuse mild to moderate in-stent restenosis with the apical segment showing severe in-stent restenosis with DARIUS-3 flow into apical LAD, high diagonal shows severe diffuse disease with significant progression compared to prior cardiac catheterization films  LCx: Large caliber vessel, calcified, mild disease through the proximal end AV groove circumflex, very small caliber OM1, large caliber OM 2 with a mid vessel focal 30 to 40% stenosis, subsequently with distal bifurcation with the superior branch with previous coronary intervention demonstrating diffuse mild to moderate disease and inferior branch demonstrating severe diffuse disease with a proximal 80% stenosis  RCA: Large-caliber vessel, calcified, diffuse mild disease throughout the vessel with a focal 70% stenosis by QCA within the distal right coronary artery, small to moderate PDA and small to moderate posterior lateral branch with diffuse moderate disease    LVEDP:  8-10 mmhg      Plan:  Proximal LAD and showed minimal progression in disease. Abnormality on stress test likely due to in-stent restenosis of the very distal aspect of the LAD. Recommend deferring any intervention to the apical LAD due to lack of vessel runoff will predispose patient to recurrent restenosis. Recommend deferring IFR guided revascularization of the distal right coronary artery due to lack of chest pain symptoms and the fact patient can be undergoing knee surgery in the near future. I would defer revascularization of the distal right coronary artery unless patient develops anginal chest pain symptoms. Signed by: Hiwot Aviles DO on 12/11/2021  2:09 PM          EKG Results     None              IMAGING      MRI Results (most recent):  Results from [place] encounter on 08/25/15    MRA CHEST W CONT    Narrative  **Final Report**      ICD Codes / Adm. Diagnosis: 427.31  401.1 / Atrial fibrillation [facility])  Examination:  MR CHEST MRA W CON  - [id number] - Aug 25 2015 12:05PM  Accession No:  [id number]  Reason:  preop      REPORT:    CARDIOVASCULAR MRI    INDICATION: preop    PROCEDURAL DATA:    CPT Codes: 42842    SCANS PERFORMED:  Spin Echo: Axial.  Pulmonary Vein Angiogram    Contrast Agent: Magnevist.  Contrast Dose: 40ml. CLINICAL DATA:  HR = 74/min, BP = 135/91mmHg, HT = 5 feet 10, WT = 274lb. Impression  :    1. All pulmonary veins visualized draining the left atrium. There is no  accessory pulmonary veins. There is no sinus venosus defect. The left upper  and lower pulmonary vein drain close to each other without significant  separation. The esophagus courses behind the left atrium close to the os of  the left upper and lower pulmonary veins. The individual pulmonary vein  ostial dimensions are as follows:    Right upper pulmonary vein: 23 x 21 mm. Right lower pulmonary vein: 28 x 21 mm. Left upper pulmonary vein: 19 x 18 mm.     Left lower pulmonary vein: 25 x 19 mm.  2. These is no left atrial appendage thrombus. 3. Normal origin of the great vessels of the aortic arch. Normal ascending  aorta and descending thoracic aorta without aneurysm or dissection. Signing/Reading Doctor: Genaro Ogden ([id number])  Mica Melton ([id number])  Aug 25 2015  4:22PM      CT Results (most recent):  Results from Hospital Encounter encounter on 02/03/22    CT LOW EXT RT WO CONT    Narrative  INTERPRETATION PROVIDED FOR COMPLIANCE ONLY AT NO CHARGE  The CT scan was ordered for presurgical planning without specific request for  radiologic interpretation. Moderate to severe right knee osteoarthritis with large joint effusion and  popliteal cyst.. No fracture or aggressive bone lesion. Atherosclerosis. Imaging sent to the  for surgical planning. XR Results (most recent):  Results from Hospital Encounter encounter on 02/17/22    XR KNEE RT MAX 2 VWS    Narrative  EXAM: XR KNEE RT MAX 2 VWS    INDICATION: post-op film. COMPARISON: None. FINDINGS: AP and crosstable lateral views of the right knee were obtained. There  has been recent placement of a knee prosthesis. Impression  Recent placement of a right knee prosthesis.           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    Nares    ZAK on CPAP     Type 2 diabetes mellitus ([facility] [address]      Past Surgical History:   Procedure Laterality Date    HX AFIB ABLATION      HX AMPUTATION FINGER Left 1986    Ring finger    HX ANGIOPLASTY      HX HEART CATHETERIZATION Left 12/10/2021    HX ORTHOPAEDIC  2007    L Knee Arthroscopy    [place] CARDIAC SURG PROCEDURE UNLIST      3 stents placed 2009,2011     Social History     Tobacco Use  Smoking status: Never Smoker    Smokeless tobacco: Never Used   Substance Use Topics    Alcohol use: No     Alcohol/week: 0.0 standard drinks    Drug use: No     Family History   Problem Relation Age of Onset    OSTEOARTHRITIS Mother     Diabetes Mother     Elevated Lipids Mother     Heart Disease Mother     Hypertension Mother    [place] Elevated Lipids Father     Heart Disease Father     Hypertension Father     Cancer Father         skin and angiosarcoma    Diabetes Maternal Grandmother     Hypertension Brother     Diabetes Brother     Hypertension Brother     Diabetes Brother     Hypertension Brother      Allergies   Allergen Reactions    Cleocin [Clindamycin Hcl] Rash         Visit Vitals  /70   Pulse 76   Resp 16   Ht 5' 10\" (1.778 m)   Wt 272 lb (123.4 kg)   SpO2 95%   BMI 39.03 kg/m²         Last 3 Recorded Weights in this Encounter    04/28/22 1058   Weight: 272 lb (123.4 kg)            Review of Systems:   Pertinent items are noted in the History of Present Illness. Neck: no JVD  Heart: regular rate and rhythm, systolic murmur: systolic ejection 2/6, crescendo at 2nd right intercostal space  Lungs: clear to auscultation bilaterally  Abdomen: soft, non-tender. Bowel sounds normal. No masses,  no organomegaly  Extremities: edema trace      Current Outpatient Medications on File Prior to Visit   Medication Sig Dispense Refill    V-GO 40 vinicio AS DIRECTED UNDER THE SKIN ONCE A DAY      HumaLOG U-100 Insulin 100 unit/mL injection INJECT 56 UNITS UNDER THE SKIN ONCE A DAY AS DIRECTED      FreeStyle Almita 14 Day Sensor kit       Vascepa 1 gram capsule TAKE 2 CAPSULES BY MOUTH TWICE DAILY WITH MEALS 360 Capsule 1    aspirin delayed-release 81 mg tablet Take 1 Tablet by mouth two (2) times a day. 60 Tablet 0    pregabalin (Lyrica) 75 mg capsule Take 1 Capsule by mouth daily (with dinner).  Max Daily Amount: 75 mg. 30 Capsule 0    acetaminophen (Acetaminophen Pain Relief) 500 mg tablet Take 2 Tablets by mouth every six (6) hours as needed for Pain. 60 Tablet 1    sub-q insulin device, 30 unit (V-GO 30) vinicio by SubCUTAneous route. 30U cont 24hrs/d  Sliding scale w/meals      pantoprazole (Protonix) 40 mg tablet Take 40 mg by mouth nightly.  rosuvastatin (CRESTOR) 40 mg tablet Take 1 Tablet by mouth nightly. 90 Tablet 1    hydroCHLOROthiazide (HYDRODIURIL) 25 mg tablet TAKE 1 TABLET BY MOUTH ONCE DAILY 90 Tablet 1    dilTIAZem ER (CARDIZEM CD) 240 mg capsule TAKE 1 CAPSULE BY MOUTH ONCE DAILY 90 Capsule 1    clopidogreL (PLAVIX) 75 mg tab Take 1 Tablet by mouth daily. 90 Tablet 1    fosinopriL (MONOPRIL) 40 mg tablet TAKE 1 TABLET BY MOUTH ONCE DAILY 90 Tablet 1    metFORMIN (GLUCOPHAGE) 1,000 mg tablet Take 1,000 mg by mouth daily.  Vitamin D3 50 mcg (2,000 unit) cap capsule Take 1 capsule by mouth once daily 90 Cap 3    DULoxetine (Cymbalta) 30 mg capsule Take 1 Cap by mouth daily.  miSOPROStoL (CYTOTEC) 200 mcg tablet Take 1 Tablet by mouth nightly.  semaglutide (OZEMPIC SC) by SubCUTAneous route every seven (7) days.  JARDIANCE 25 mg tablet Take 25 mg by mouth daily.  ONETOUCH ULTRA TEST strip       multivitamin (ONE A DAY) tablet Take 1 Tablet by mouth daily.  ibuprofen (MOTRIN) 800 mg tablet Take 1 Tablet by mouth every six (6) hours as needed for Pain. 50 Tablet 2    ondansetron (ZOFRAN ODT) 8 mg disintegrating tablet Take 0.5 Tablets by mouth every eight (8) hours as needed for Nausea. 30 Tablet 0    nebivoloL (Bystolic) 10 mg tablet Take  by mouth nightly.  sildenafil citrate (Viagra) 100 mg tablet Take as directed (Patient not taking: Reported on 2/17/2022) 30 Tablet 0    nitroglycerin (NITROSTAT) 0.4 mg SL tablet 1 Tablet by SubLINGual route every five (5) minutes as needed for Chest Pain for up to 3 doses.  (Patient not taking: Reported on 2/17/2022) 25 Each 1     No current facility-administered medications on file prior to visit. Jessica Solano. Sherin Peterson had no medications administered during this visit. Current Outpatient Medications   Medication Sig    V-GO 40 vinicio AS DIRECTED UNDER THE SKIN ONCE A DAY    HumaLOG U-100 Insulin 100 unit/mL injection INJECT 56 UNITS UNDER THE SKIN ONCE A DAY AS DIRECTED    FreeStyle Almita 14 Day Sensor kit     Vascepa 1 gram capsule TAKE 2 CAPSULES BY MOUTH TWICE DAILY WITH MEALS    aspirin delayed-release 81 mg tablet Take 1 Tablet by mouth two (2) times a day.  pregabalin (Lyrica) 75 mg capsule Take 1 Capsule by mouth daily (with dinner). Max Daily Amount: 75 mg.  acetaminophen (Acetaminophen Pain Relief) 500 mg tablet Take 2 Tablets by mouth every six (6) hours as needed for Pain.  sub-q insulin device, 30 unit (V-GO 30) vinicio by SubCUTAneous route. 30U cont 24hrs/d  Sliding scale w/meals    pantoprazole (Protonix) 40 mg tablet Take 40 mg by mouth nightly.  rosuvastatin (CRESTOR) 40 mg tablet Take 1 Tablet by mouth nightly.  hydroCHLOROthiazide (HYDRODIURIL) 25 mg tablet TAKE 1 TABLET BY MOUTH ONCE DAILY    dilTIAZem ER (CARDIZEM CD) 240 mg capsule TAKE 1 CAPSULE BY MOUTH ONCE DAILY    clopidogreL (PLAVIX) 75 mg tab Take 1 Tablet by mouth daily.  fosinopriL (MONOPRIL) 40 mg tablet TAKE 1 TABLET BY MOUTH ONCE DAILY    metFORMIN (GLUCOPHAGE) 1,000 mg tablet Take 1,000 mg by mouth daily.  Vitamin D3 50 mcg (2,000 unit) cap capsule Take 1 capsule by mouth once daily    DULoxetine (Cymbalta) 30 mg capsule Take 1 Cap by mouth daily.  miSOPROStoL (CYTOTEC) 200 mcg tablet Take 1 Tablet by mouth nightly.  semaglutide (OZEMPIC SC) by SubCUTAneous route every seven (7) days.  JARDIANCE 25 mg tablet Take 25 mg by mouth daily.  ONETOUCH ULTRA TEST strip     multivitamin (ONE A DAY) tablet Take 1 Tablet by mouth daily.  ibuprofen (MOTRIN) 800 mg tablet Take 1 Tablet by mouth every six (6) hours as needed for Pain.     ondansetron (ZOFRAN ODT) 8 mg disintegrating tablet Take 0.5 Tablets by mouth every eight (8) hours as needed for Nausea.  nebivoloL (Bystolic) 10 mg tablet Take  by mouth nightly.  sildenafil citrate (Viagra) 100 mg tablet Take as directed (Patient not taking: Reported on 2/17/2022)    nitroglycerin (NITROSTAT) 0.4 mg SL tablet 1 Tablet by SubLINGual route every five (5) minutes as needed for Chest Pain for up to 3 doses. (Patient not taking: Reported on 2/17/2022)     No current facility-administered medications for this visit. Lab Results   Component Value Date/Time    Cholesterol, total 170 02/07/2022 08:44 AM    HDL Cholesterol 40 02/07/2022 08:44 AM    LDL,Direct 80 07/10/2015 04:44 AM    LDL, calculated 74 02/07/2022 08:44 AM    LDL, calculated 50 09/11/2019 08:50 AM    VLDL, calculated 56 (H) 02/07/2022 08:44 AM    VLDL, calculated 53 (H) 09/11/2019 08:50 AM    Triglyceride 349 (H) 02/07/2022 08:44 AM    CHOL/HDL Ratio 5.6 (H) 07/10/2015 04:44 AM       Lab Results   Component Value Date/Time    Sodium 138 02/18/2022 04:55 AM    Potassium 4.3 02/18/2022 04:55 AM    Chloride 104 02/18/2022 04:55 AM    CO2 27 02/18/2022 04:55 AM    Anion gap 7 02/18/2022 04:55 AM    Glucose 210 (H) 02/18/2022 04:55 AM    BUN 32 (H) 02/18/2022 04:55 AM    Creatinine 1.41 (H) 02/18/2022 04:55 AM    BUN/Creatinine ratio 23 (H) 02/18/2022 04:55 AM    GFR est AA >60 02/18/2022 04:55 AM    GFR est non-AA 52 (L) 02/18/2022 04:55 AM    Calcium 8.6 02/18/2022 04:55 AM       Lab Results   Component Value Date/Time    ALT (SGPT) 36 02/07/2022 09:30 AM    Alk.  phosphatase 65 02/07/2022 09:30 AM    Bilirubin, direct 0.14 09/11/2019 08:50 AM    Bilirubin, total 0.6 02/07/2022 09:30 AM       Lab Results   Component Value Date/Time    WBC 7.0 02/07/2022 09:30 AM    Hemoglobin (POC) 14.6 01/24/2010 02:15 AM    HGB 12.9 02/18/2022 04:55 AM    Hematocrit (POC) 43 01/24/2010 02:15 AM    HCT 49.9 02/07/2022 09:30 AM    PLATELET 797 88/25/3954 09:30 AM    MCV 88.2 02/07/2022 09:30 AM       Lab Results   Component Value Date/Time    TSH 0.94 07/09/2015 03:15 AM         Lab Results   Component Value Date/Time    Cholesterol, total 170 02/07/2022 08:44 AM    Cholesterol, total 139 09/11/2019 08:50 AM    Cholesterol, total 195 01/29/2019 08:56 AM    Cholesterol, total 151 09/15/2017 08:56 AM    Cholesterol, total 208 (H) 04/24/2017 08:34 AM    HDL Cholesterol 40 02/07/2022 08:44 AM    HDL Cholesterol 36 (L) 09/11/2019 08:50 AM    HDL Cholesterol 36 (L) 01/29/2019 08:56 AM    HDL Cholesterol 39 (L) 09/15/2017 08:56 AM    HDL Cholesterol 49 04/24/2017 08:34 AM    LDL,Direct 80 07/10/2015 04:44 AM    LDL, calculated 74 02/07/2022 08:44 AM    LDL, calculated 50 09/11/2019 08:50 AM    LDL, calculated Comment 01/29/2019 08:56 AM    LDL, calculated 58 09/15/2017 08:56 AM    LDL, calculated 95 04/24/2017 08:34 AM    LDL, calculated 66 02/03/2016 09:40 AM    Triglyceride 349 (H) 02/07/2022 08:44 AM    Triglyceride 266 (H) 09/11/2019 08:50 AM    Triglyceride 442 (H) 01/29/2019 08:56 AM    Triglyceride 271 (H) 09/15/2017 08:56 AM    Triglyceride 321 (H) 04/24/2017 08:34 AM    CHOL/HDL Ratio 5.6 (H) 07/10/2015 04:44 AM                Please note that this dictation was completed with [employer], the [employer] voice recognition software. Quite often unanticipated grammatical, syntax, homophones, and other interpretative errors are inadvertently transcribed by the computer software. Please disregard these errors. Please excuse any errors that have escaped final proofreading.

## 2024-02-06 RX ORDER — ROSUVASTATIN CALCIUM 40 MG/1
40 TABLET, COATED ORAL NIGHTLY
Qty: 90 TABLET | Refills: 1 | Status: SHIPPED | OUTPATIENT
Start: 2024-02-06

## 2024-02-06 NOTE — TELEPHONE ENCOUNTER
Cardiologist: Dr. Torres    Future Appointments   Date Time Provider Department Center   7/3/2024  8:40 AM Chester Wilson MD [facility]   12/20/2024  8:20 AM Thai Torres MD [facility]       Requested Prescriptions     Signed Prescriptions Disp Refills    rosuvastatin (CRESTOR) 40 MG tablet 90 tablet 1     Sig: Take 1 tablet by mouth nightly     Authorizing Provider: BRYSON GONZALEZ     Ordering User: ELDER GUERRERO         Refkarely VO per Dr. Torres.

## 2024-05-01 RX ORDER — HYDROCHLOROTHIAZIDE 25 MG/1
TABLET ORAL
Qty: 90 TABLET | Refills: 1 | Status: SHIPPED | OUTPATIENT
Start: 2024-05-01

## 2024-05-01 NOTE — TELEPHONE ENCOUNTER
Requested Prescriptions     Signed Prescriptions Disp Refills    hydroCHLOROthiazide (HYDRODIURIL) 25 MG tablet 90 tablet 1     Sig: Take 1 tablet by mouth once daily     Authorizing Provider: THAI TORRES     Ordering User: ELDA REBOLLEDO     Verbal order per Dr. Torres.    Future Appointments   Date Time Provider Department Center   7/3/2024  8:40 AM Chester Wilson MD [facility]   12/20/2024  8:20 AM Thai Torres MD [facility]

## 2024-05-06 ENCOUNTER — TELEPHONE (OUTPATIENT)
Age: 59
End: 2024-05-06

## 2024-05-06 NOTE — TELEPHONE ENCOUNTER
Patient contacted and verified by 2 Identifiers.    Advised patient that his 12/20/2024 appointment with Dr. Torres has been cancelled and needs to be rescheduled.   Patient requested to return office call, to re-kimmy at a later date.

## 2024-05-07 RX ORDER — HYDROCHLOROTHIAZIDE 25 MG/1
TABLET ORAL
Qty: 90 TABLET | Refills: 0 | OUTPATIENT
Start: 2024-05-07

## 2024-05-07 NOTE — TELEPHONE ENCOUNTER
Requested Prescriptions     Refused Prescriptions Disp Refills    hydroCHLOROthiazide (HYDRODIURIL) 25 MG tablet [Pharmacy Med Name: hydroCHLOROthiazide 25 MG Oral Tablet] 90 tablet 0     Sig: Take 1 tablet by mouth once daily     Refused By: DONIS CORONA     Reason for Refusal: Patient has requested refill too soon   Refilled 5/1/24

## 2024-07-03 ENCOUNTER — OFFICE VISIT (OUTPATIENT)
Age: 59
End: 2024-07-03
Payer: COMMERCIAL

## 2024-07-03 VITALS
BODY MASS INDEX: 39.37 KG/M2 | SYSTOLIC BLOOD PRESSURE: 122 MMHG | DIASTOLIC BLOOD PRESSURE: 76 MMHG | RESPIRATION RATE: 16 BRPM | HEART RATE: 80 BPM | OXYGEN SATURATION: 94 % | HEIGHT: 70 IN | WEIGHT: 275 LBS

## 2024-07-03 DIAGNOSIS — Z98.890 S/P ABLATION OF ATRIAL FIBRILLATION: ICD-10-CM

## 2024-07-03 DIAGNOSIS — Z79.01 ANTICOAGULATED: ICD-10-CM

## 2024-07-03 DIAGNOSIS — Z86.79 S/P ABLATION OF ATRIAL FIBRILLATION: ICD-10-CM

## 2024-07-03 DIAGNOSIS — I48.0 PAROXYSMAL ATRIAL FIBRILLATION (HCC): Primary | ICD-10-CM

## 2024-07-03 DIAGNOSIS — I25.10 CORONARY ARTERY DISEASE INVOLVING NATIVE CORONARY ARTERY OF NATIVE HEART WITHOUT ANGINA PECTORIS: ICD-10-CM

## 2024-07-03 PROCEDURE — 3078F DIAST BP <80 MM HG: CPT | Performed by: INTERNAL MEDICINE

## 2024-07-03 PROCEDURE — 99214 OFFICE O/P EST MOD 30 MIN: CPT | Performed by: INTERNAL MEDICINE

## 2024-07-03 PROCEDURE — 3074F SYST BP LT 130 MM HG: CPT | Performed by: INTERNAL MEDICINE

## 2024-07-03 RX ORDER — ASPIRIN 81 MG/1
81 TABLET ORAL DAILY
Qty: 30 TABLET | Refills: 0
Start: 2024-07-03

## 2024-07-03 RX ORDER — TIRZEPATIDE 10 MG/.5ML
10 INJECTION, SOLUTION SUBCUTANEOUS WEEKLY
COMMUNITY
Start: 2024-05-01

## 2024-07-03 NOTE — PROGRESS NOTES
Room #: EP 4    Chief Complaint   Patient presents with    Atrial Fibrillation     Atrial Flutter     /76 (Site: Left Upper Arm, Position: Sitting, Cuff Size: Large Adult)   Pulse 80   Resp 16   Ht 1.778 m (5' 10\")   Wt 124.7 kg (275 lb)   SpO2 94%   BMI 39.46 kg/m²       Chest pain: NO       1. Have you been to the ER, urgent care clinic outside of [facility] since your last visit?  Hospitalized since your last visit?  NO        Refills:  NO

## 2024-07-03 NOTE — PROGRESS NOTES
[facility] Cardiology   Cardiac Electrophysiology Clinic Care Note       [ ]Initial visit     [x]Established visit     Patient Name: Henry Roberts - :1965 - MRN:[id number]   Primary Cardiologist: Thai Torres MD   Electrophysiologist: Chester Wilson MD     Reason for visit: PAF/persistent AFL follow up   He will take aspirin 81 mg qd    HPI:   Mr. Roberts is a 59 y.o. male who presents for follow up s/p PAF cryoablation & persistent typical atrial flutter ablation on 2023.     LVEF 55-60% per ALBA in 2023 with mildly dilated LA & mild MR/AR.     BP controlled.     Anticoagulated with Eliquis, denies bleeding issues.     He denies chest pain, palpitations, dyspnea, dizziness or syncope.       Previous:   S/p PAF cryoablation & persistent typical atrial flutter ablation on 2023.     S/p DCCV 2023.     LHC in 2021 showed ISR of distal LAD, severe apical ISR, severe diffuse disease in diagonal, LCx disease, & moderate RCA disease.  Revascularization deferred.     S/p AF ablation in .     S/p PCI LAD in  & .     STEPHANIE on CPAP        Assessment and Plan      Diagnosis Orders   1. Paroxysmal atrial fibrillation (HCC)        2. S/P ablation of atrial fibrillation        3. Anticoagulated        4. Coronary artery disease involving native coronary artery of native heart without angina pectoris              AF/AFL: S/p ablation in  & DCCV in 2023.; he had subsequent recurrence on Multaq.  Now s/p PAF cryoablation & persistent typical atrial flutter ablation on 2023; stopped amiodarone post ablation. Continue nebivolol.     CAD: S/p prior PCI, had 3V disease noted via LHC in 2021.  No angina.  Continue statin.     HTN: BP controlled.  Continue fosinopril, Bystolic.     Anticoagulation: Continue Eliquis for embolic CVA prophylaxis.  Denies bleeding issues.      Chester Wilson M.D.   Electrophysiology/Cardiology   [facility] [place]   [address]

## 2024-08-01 RX ORDER — ROSUVASTATIN CALCIUM 40 MG/1
40 TABLET, COATED ORAL NIGHTLY
Qty: 90 TABLET | Refills: 1 | Status: SHIPPED | OUTPATIENT
Start: 2024-08-01

## 2024-09-05 RX ORDER — FOSINOPRIL SODIUM 40 MG/1
40 TABLET ORAL DAILY
Qty: 90 TABLET | Refills: 0 | Status: SHIPPED | OUTPATIENT
Start: 2024-09-05

## 2024-09-05 NOTE — TELEPHONE ENCOUNTER
Requested Prescriptions     Signed Prescriptions Disp Refills    fosinopril (MONOPRIL) 40 MG tablet 90 tablet 0     Sig: Take 1 tablet by mouth daily (Please schedule follow up with Dr. Torres)     Authorizing Provider: BARBARA TORRES     Ordering User: ELDA REBOLLEDO     Verbal order per Dr. Torres.

## 2024-09-16 ENCOUNTER — TELEPHONE (OUTPATIENT)
Age: 59
End: 2024-09-16

## 2024-09-16 DIAGNOSIS — I48.3 TYPICAL ATRIAL FLUTTER (HCC): ICD-10-CM

## 2024-09-16 DIAGNOSIS — I48.0 PAROXYSMAL ATRIAL FIBRILLATION (HCC): Primary | ICD-10-CM

## 2024-09-17 ENCOUNTER — TELEPHONE (OUTPATIENT)
Age: 59
End: 2024-09-17

## 2024-09-17 RX ORDER — NEBIVOLOL 10 MG/1
10 TABLET ORAL DAILY
Qty: 90 TABLET | Refills: 1 | Status: SHIPPED | OUTPATIENT
Start: 2024-09-17

## 2024-10-13 ENCOUNTER — CLINICAL DOCUMENTATION (OUTPATIENT)
Age: 59
End: 2024-10-13

## 2024-10-13 NOTE — H&P (VIEW-ONLY)
Holter:    The predominant rhythm was atrial fibrillation/flutter.    *The Maximum Heart Rate recorded was 123 bpm, 09/29 19:40:16, the Minimum Heart Rate recorded was 53 bpm, 10/02 20:41:56, and the Average Heart Rate was 96 bpm.    *There were 2,230 VE beats with a burden of <1 %.    *The study included an Atrial Fibrillation/Flutter [place] of >99 % with 24 % in RVR and <1 % in SVR.     The longest episode was 6d 23h 13m 58.5s, 09/26 10:27:02, and the Fastest episode was 123 bpm, 09/26  10:27:02.    *There were 0 Patient Triggers.    Recommend cardiovert and consider pulse field ablation   He had cryoablation in 2013

## 2024-10-14 ENCOUNTER — TELEPHONE (OUTPATIENT)
Age: 59
End: 2024-10-14

## 2024-10-14 DIAGNOSIS — I48.3 TYPICAL ATRIAL FLUTTER (HCC): ICD-10-CM

## 2024-10-14 DIAGNOSIS — I48.0 PAROXYSMAL ATRIAL FIBRILLATION (HCC): Primary | ICD-10-CM

## 2024-10-14 DIAGNOSIS — I10 PRIMARY HYPERTENSION: ICD-10-CM

## 2024-10-14 NOTE — TELEPHONE ENCOUNTER
----- Message from Dr. Chester Wilson MD sent at 10/13/2024 10:15 AM EDT -----  · Holter:    The predominant rhythm was atrial fibrillation/flutter.    #The Maximum Heart Rate recorded was 123 bpm, 09/29 19:40:16, the Minimum Heart Rate recorded was 53 bpm, 10/02 20:41:56, and the Average Heart Rate was 96 bpm.    #There were 2,230 VE beats with a burden of <1 %.    #The study included an Atrial Fibrillation/Flutter [place] of >99 % with 24 % in RVR and <1 % in SVR.     The longest episode was 6d 23h 13m 58.5s, 09/26 10:27:02, and the Fastest episode was 123 bpm, 09/26  10:27:02.    #There were 0 Patient Triggers.    Recommend cardiovert and consider pulse field ablation   He has CAD and stent so amiodarone only can be used after cardioversion.  He can be started on 200 mg qd    He had cryoablation in 2013

## 2024-10-15 PROBLEM — I48.3 TYPICAL ATRIAL FLUTTER (HCC): Status: ACTIVE | Noted: 2024-10-14

## 2024-10-15 NOTE — TELEPHONE ENCOUNTER
Verified patient with two types of identifiers.  Informed patient of monitor results and Dr. Wilson's recommendations.  Patient agreeable to schedule DCCV on 10/25 at 1100 am at .  Will send pre procedure instructions via [employer].  Discussed PFA with patient.  Informed patient he can discuss this further at his follow up appointment post DCCV.  Patient verbalized understanding and will call with any other questions.      Future Appointments   Date Time Provider Department Center   12/4/2024  9:20 AM Chester Wilson MD [facility]

## 2024-10-17 ENCOUNTER — PREP FOR PROCEDURE (OUTPATIENT)
Age: 59
End: 2024-10-17

## 2024-10-17 DIAGNOSIS — I48.0 PAROXYSMAL ATRIAL FIBRILLATION (HCC): ICD-10-CM

## 2024-10-17 DIAGNOSIS — I10 PRIMARY HYPERTENSION: ICD-10-CM

## 2024-10-17 DIAGNOSIS — I48.3 TYPICAL ATRIAL FLUTTER (HCC): ICD-10-CM

## 2024-10-18 LAB
ANION GAP SERPL CALC-SCNC: 12 MMOL/L (ref 2–12)
BUN SERPL-MCNC: 28 MG/DL (ref 6–20)
BUN/CREAT SERPL: 16 (ref 12–20)
CALCIUM SERPL-MCNC: 9.6 MG/DL (ref 8.5–10.1)
CHLORIDE SERPL-SCNC: 102 MMOL/L (ref 97–108)
CO2 SERPL-SCNC: 26 MMOL/L (ref 21–32)
CREAT SERPL-MCNC: 1.75 MG/DL (ref 0.7–1.3)
GLUCOSE SERPL-MCNC: 109 MG/DL (ref 65–100)
POTASSIUM SERPL-SCNC: 3.6 MMOL/L (ref 3.5–5.1)
SODIUM SERPL-SCNC: 140 MMOL/L (ref 136–145)

## 2024-10-19 RX ORDER — SODIUM CHLORIDE 0.9 % (FLUSH) 0.9 %
5-40 SYRINGE (ML) INJECTION PRN
Status: CANCELLED | OUTPATIENT
Start: 2024-10-19

## 2024-10-20 LAB — MAGNESIUM: 2.5 MG/DL (ref 1.6–2.3)

## 2024-10-22 RX ORDER — HYDROCHLOROTHIAZIDE 25 MG/1
25 TABLET ORAL DAILY
Qty: 30 TABLET | Refills: 0 | Status: SHIPPED | OUTPATIENT
Start: 2024-10-22

## 2024-10-22 NOTE — TELEPHONE ENCOUNTER
Cardiologist: Dr. Torres    Future Appointments   Date Time Provider Department Center   12/4/2024  9:20 AM Chester Wilson MD [facility] AM[facility]       Requested Prescriptions     Signed Prescriptions Disp Refills    hydroCHLOROthiazide (HYDRODIURIL) 25 MG tablet 30 tablet 0     Sig: Take 1 tablet by mouth daily **FOLLOW UP WITH DR TORRES FOR REFILLS**     Authorizing Provider: BARBARA TORRES     Ordering User: ELDER GUERRERO         Refills VO per Dr. Torres.

## 2024-10-29 ENCOUNTER — CLINICAL DOCUMENTATION (OUTPATIENT)
Age: 59
End: 2024-10-29

## 2024-11-04 ENCOUNTER — PATIENT MESSAGE (OUTPATIENT)
Age: 59
End: 2024-11-04

## 2024-11-06 NOTE — PRE-PROCEDURE INSTRUCTIONS
Telephone call to patient. ID verified. Made aware of changes in schedule and need him to come in at 0730. Pt voiced understanding.

## 2024-11-07 NOTE — DISCHARGE INSTRUCTIONS
Discharge Instructions Post Cardioversion    Stop hydrochlorothiazide  Start multaq twice a day with meals  Resume other meds    No driving x 24 hours due to sedation medication that you received during your procedure.  Resume medications as previously prescribed.  Follow up as noted below.    My nurse will arrange pulse field AF ablation    Future Appointments   Date Time Provider Department Center   12/4/2024  9:20 AM Chester Wilson MD [facility] AM[facility]       Chester Wilson M.D.  Electrophysiology/Cardiology  [facility]  [address], [address]  [address] [address]                [place] [address]                  [place] [address] [phone]    Post Sedation: Care Instructions  Overview  Sedation is the use of medicine to help you feel relaxed and comfortable during a procedure. The medicine is usually given in a vein (by IV).   There are different levels of sedation. They range from being awake but relaxed to being completely unconscious. Which level you have will depend on the procedure and your needs. You will be watched closely by a doctor or nurse during sedation.  Common side effects from sedation include:  Feeling sleepy or tired. (Your doctors and nurses will make sure you aren't too sleepy to go home.)  Feeling dizzy or unsteady.  How can you care for yourself at home?  Activity    Don't do anything that requires attention to detail until you recover. This includes going to work or school, making important decisions, and signing any legal documents. It takes time for the medicine effects to completely wear off.     For at least 24 hours, do not drive or operate any machinery.     When you get home, make sure to rest until the anesthesia has worn off. Some people will feel drowsy or dizzy for up to a few hours after leaving the hospital.     Take your time and walk slowly. Sudden changes in position may cause nausea.     Rest when you feel tired.  Getting enough sleep will help you recover.     If you have sleep apnea and you have a CPAP machine, be sure to use it.   Diet    Don't drink alcohol for 24 hours.     If your stomach is upset, try clear liquids and bland, low-fat foods like plain toast or rice.     Drink plenty of fluids (unless your doctor tells you not to).   When should you call for help?  Call 911 anytime you think you may need emergency care. For example, call if:    You have trouble breathing.     You passed out (lost consciousness).   Call your doctor now or seek immediate medical care if:    You have nausea or vomiting that gets worse or won't stop.     You have a fever.     You have a new or worse headache.     The medicine is not wearing off and you can't think clearly.

## 2024-11-08 ENCOUNTER — TELEPHONE (OUTPATIENT)
Age: 59
End: 2024-11-08

## 2024-11-08 ENCOUNTER — HOSPITAL ENCOUNTER (OUTPATIENT)
Facility: HOSPITAL | Age: 59
Setting detail: OUTPATIENT SURGERY
Discharge: HOME | End: 2024-11-08
Attending: INTERNAL MEDICINE | Admitting: INTERNAL MEDICINE
Payer: COMMERCIAL

## 2024-11-08 VITALS
TEMPERATURE: 97.5 F | HEIGHT: 70 IN | DIASTOLIC BLOOD PRESSURE: 69 MMHG | SYSTOLIC BLOOD PRESSURE: 93 MMHG | WEIGHT: 276 LBS | HEART RATE: 86 BPM | BODY MASS INDEX: 39.51 KG/M2 | OXYGEN SATURATION: 92 % | RESPIRATION RATE: 23 BRPM

## 2024-11-08 DIAGNOSIS — I48.19 PERSISTENT ATRIAL FIBRILLATION (HCC): Primary | ICD-10-CM

## 2024-11-08 DIAGNOSIS — I48.3 TYPICAL ATRIAL FLUTTER (HCC): ICD-10-CM

## 2024-11-08 DIAGNOSIS — I48.0 PAROXYSMAL ATRIAL FIBRILLATION (HCC): ICD-10-CM

## 2024-11-08 LAB
ANION GAP SERPL CALC-SCNC: 3 MMOL/L (ref 2–12)
BUN SERPL-MCNC: 27 MG/DL (ref 6–20)
BUN/CREAT SERPL: 18 (ref 12–20)
CALCIUM SERPL-MCNC: 9.6 MG/DL (ref 8.5–10.1)
CHLORIDE SERPL-SCNC: 103 MMOL/L (ref 97–108)
CO2 SERPL-SCNC: 31 MMOL/L (ref 21–32)
CREAT SERPL-MCNC: 1.5 MG/DL (ref 0.7–1.3)
ECHO BSA: 2.49 M2
ERYTHROCYTE [DISTWIDTH] IN BLOOD BY AUTOMATED COUNT: 13.4 % (ref 11.5–14.5)
GLUCOSE SERPL-MCNC: 175 MG/DL (ref 65–100)
HCT VFR BLD AUTO: 51.9 % (ref 36.6–50.3)
HGB BLD-MCNC: 17.3 G/DL (ref 12.1–17)
MCH RBC QN AUTO: 29.3 PG (ref 26–34)
MCHC RBC AUTO-ENTMCNC: 33.3 G/DL (ref 30–36.5)
MCV RBC AUTO: 87.8 FL (ref 80–99)
NRBC # BLD: 0 K/UL (ref 0–0.01)
NRBC BLD-RTO: 0 PER 100 WBC
PLATELET # BLD AUTO: 187 K/UL (ref 150–400)
PMV BLD AUTO: 9.4 FL (ref 8.9–12.9)
POTASSIUM SERPL-SCNC: 4.2 MMOL/L (ref 3.5–5.1)
RBC # BLD AUTO: 5.91 M/UL (ref 4.1–5.7)
SODIUM SERPL-SCNC: 137 MMOL/L (ref 136–145)
WBC # BLD AUTO: 6.1 K/UL (ref 4.1–11.1)

## 2024-11-08 PROCEDURE — 2500000003 HC RX 250 WO HCPCS: Performed by: INTERNAL MEDICINE

## 2024-11-08 PROCEDURE — 92960 CARDIOVERSION ELECTRIC EXT: CPT | Performed by: INTERNAL MEDICINE

## 2024-11-08 PROCEDURE — 93005 ELECTROCARDIOGRAM TRACING: CPT | Performed by: INTERNAL MEDICINE

## 2024-11-08 PROCEDURE — 6370000000 HC RX 637 (ALT 250 FOR IP): Performed by: INTERNAL MEDICINE

## 2024-11-08 PROCEDURE — 85027 COMPLETE CBC AUTOMATED: CPT

## 2024-11-08 PROCEDURE — APPNB15 APP NON BILLABLE TIME 0-15 MINS

## 2024-11-08 PROCEDURE — 80048 BASIC METABOLIC PNL TOTAL CA: CPT

## 2024-11-08 PROCEDURE — 36415 COLL VENOUS BLD VENIPUNCTURE: CPT

## 2024-11-08 RX ORDER — DIGOXIN 0.25 MG/ML
500 INJECTION INTRAMUSCULAR; INTRAVENOUS ONCE
Status: DISCONTINUED | OUTPATIENT
Start: 2024-11-08 | End: 2024-11-08 | Stop reason: HOSPADM

## 2024-11-08 RX ORDER — SODIUM CHLORIDE 0.9 % (FLUSH) 0.9 %
5-40 SYRINGE (ML) INJECTION PRN
Status: DISCONTINUED | OUTPATIENT
Start: 2024-11-08 | End: 2024-11-08 | Stop reason: HOSPADM

## 2024-11-08 RX ORDER — SODIUM CHLORIDE 0.9 % (FLUSH) 0.9 %
5-40 SYRINGE (ML) INJECTION EVERY 12 HOURS SCHEDULED
Status: DISCONTINUED | OUTPATIENT
Start: 2024-11-08 | End: 2024-11-08 | Stop reason: HOSPADM

## 2024-11-08 RX ADMIN — DRONEDARONE 400 MG: 400 TABLET, FILM COATED ORAL at 08:57

## 2024-11-08 NOTE — H&P
Reason for visit: PAF/persistent AFL cardioversion       HPI:   Mr. Roberts is a 59 y.o.male who presents for cardioversion  s/p PAF cryoablation & persistent typical atrial flutter ablation on 11/27/2023.     LVEF 55-60% per ALBA in 06/2023 with mildly dilated LA & mild MR/AR.     BP controlled.     Anticoagulated with Eliquis, denies bleeding issues.     He denies chest pain, palpitations, dyspnea, dizziness or syncope.       Previous:   S/p PAF cryoablation & persistent typical atrial flutter ablation on 11/27/2023.     S/p DCCV 06/16/2023.     LHC in 12/2021 showed ISR of distal LAD, severe apical ISR, severe diffuse disease in diagonal, LCx disease, & moderate RCA disease.  Revascularization deferred.     S/p AF ablation in 2015.     S/p PCI LAD in 2009 & 2011.     STEPHANIE on CPAP        Assessment and Plan           AF/AFL: S/p ablation in 2015 & DCCV in 06/2023.; he had subsequent recurrence on Multaq.  Now s/p PAF cryoablation & persistent typical atrial flutter ablation on 11/27/2023; stopped amiodarone post ablation. Continue nebivolol.   Once cardioverted today he can take multaq again   Arrange for pulse field AF ablation  He and his wife agree    CAD: S/p prior PCI, had 3V disease noted via LHC in 12/2021.  No angina.  Continue statin.     HTN: BP controlled.  Continue fosinopril, Bystolic.     Anticoagulation: Continue Eliquis for embolic CVA prophylaxis.  Denies bleeding issues.       Chester Wilson M.D.   Electrophysiology/Cardiology   [facility] [place]   [address]                      [address] [address]   [place] [address]                             [facility] [address] [phone]    ____________________________________________________________     Cardiac testing     06/16/23     TRANSESOPHAGEAL ECHOCARDIOGRAM (CONTRAST PRN) W/ POSSIBLE CARDIOVERSION 06/19/2023  6:46 AM (Final)     Interpretation

## 2024-11-08 NOTE — TELEPHONE ENCOUNTER
----- Message from Dr. Chester Wilson MD sent at 11/8/2024  8:40 AM EST -----  pls arrange pulse field AF ablation

## 2024-11-08 NOTE — PROGRESS NOTES
Atrial fibrillation  bpm post cardioversion   Give 500 mcg once digoxin IV for rate  Multaq bid until ablation   Stop HCTZ

## 2024-11-08 NOTE — PROGRESS NOTES
Received patient from waiting area. Armband and allergies verbally confirmed with patient.  Procedure explained and consents signed.    0833: TRANSFER - IN REPORT:    Verbal report received from Gloria RN on Henry Roberts  being received from EP for routine post-op      Report consisted of patient's Situation, Background, Assessment and   Recommendations(SBAR).     Information from the following report(s) Nurse Handoff Report was reviewed with the receiving nurse.    Opportunity for questions and clarification was provided.      Assessment completed upon patient's arrival to unit and care assumed.     0833: Patient received from EP. . VS stable. Patient with no complaints at this time.     0840: Patient converted back in to aflutter with rates in the 130s-140s. Dr Wilson notified. Orders to give multaq. Patient can still discharge    0850: Patient converted back to sinus rhythm. No complaints    0857: Multaq given    0900: Orders noted for digoxin. HR has already converted. Spoke to Dr. Wilson. Can discontinue the order    0905: Called and spoke to [facility] pharmacy. They do have Multaq available.Notified patient and wife    0910: Discharge instructions and prescriptions reviewed with  patient and wife. Opportunity provided for questions. Patient and wife verbalized understanding.     0925: IV and tele removed.     0931: Patient escorted via wheelchair to entrance.  Patient wife driving. Patient discharged into care of wife.

## 2024-11-08 NOTE — INTERVAL H&P NOTE
Update History & Physical    The patient's History and Physical of October 13, 2024 was reviewed with the patient and I examined the patient. There was no change. The surgical site was confirmed by the patient and me.   He agrees with cardioversion   Plan: The risks, benefits, expected outcome, and alternative to the recommended procedure have been discussed with the patient. Patient understands and wants to proceed with the procedure.     Electronically signed by Chester Wilson MD on 11/8/2024 at 8:22 AM

## 2024-11-08 NOTE — PROCEDURES
PROCEDURE NOTE  Date: 11/8/2024   Name: Henry Roberts  YOB: 1965    Procedures    Cardiac Procedure Note   Patient: Henry Roberts  MRN: [id number]  SSN: [id number]   YOB: 1965 Age: 59 y.o.  Sex: male    Date of Procedure: 11/8/2024   Pre-procedure Diagnosis: Atrial Fibrillation/Atrial Flutter  Post-procedure Diagnosis: NSR  Procedure: Cardioversion  :  Dr. Chester Wilson MD    Assistant(s):  None  Anesthesia: Moderate Sedation   Estimated Blood Loss: none  Specimens Removed: None  Findings: 360 J CV to NSR  Complications: None   Implants:  None  Signed by:  Chester Wilson MD  11/8/2024  8:36 AM

## 2024-11-09 LAB
EKG ATRIAL RATE: 276 BPM
EKG DIAGNOSIS: NORMAL
EKG P AXIS: 100 DEGREES
EKG Q-T INTERVAL: 360 MS
EKG QRS DURATION: 86 MS
EKG QTC CALCULATION (BAZETT): 445 MS
EKG R AXIS: 17 DEGREES
EKG T AXIS: 48 DEGREES
EKG VENTRICULAR RATE: 92 BPM

## 2024-11-09 PROCEDURE — 93010 ELECTROCARDIOGRAM REPORT: CPT | Performed by: INTERNAL MEDICINE

## 2024-11-12 ENCOUNTER — TELEPHONE (OUTPATIENT)
Age: 59
End: 2024-11-12

## 2024-11-12 NOTE — TELEPHONE ENCOUNTER
Patient states that he would like to speak with nurse, he says he just got a cardioversion done and before he left the hospital he was back in afib so they scheduled him an ablation but that's not until March. He says she wants to discuss some things with Dr or nurse for some medical advice on his symptoms, he has no energy and after a little walking he has to take deep breaths.      Phone [phone]

## 2024-11-13 ENCOUNTER — PATIENT MESSAGE (OUTPATIENT)
Age: 59
End: 2024-11-13

## 2024-11-13 DIAGNOSIS — I48.0 PAF (PAROXYSMAL ATRIAL FIBRILLATION) (HCC): Primary | ICD-10-CM

## 2024-11-13 RX ORDER — AMIODARONE HYDROCHLORIDE 400 MG/1
400 TABLET ORAL 2 TIMES DAILY
Qty: 30 TABLET | Refills: 0 | Status: SHIPPED | OUTPATIENT
Start: 2024-11-13

## 2024-11-13 NOTE — TELEPHONE ENCOUNTER
Verified patient with two types of identifiers.  Patient states he had DCCV on 11/8, but went back into afib shortly after.  Patient states his HCTZ was stopped, and Multaq restarted at that time.  Since the cardioversion, his vital signs have been normal, but he gets very \"winded\" and tired with walking a short distance at a regular pace.  His pulse rate when this occurs is between 71-90 bpm.  Sometimes his arms feel weak when this is occurring.  He is not experiencing any symptoms of illness.  Will inform MD/NP and return call with recommendations.  Patient verbalized understanding and will call with any other questions.

## 2024-11-14 NOTE — TELEPHONE ENCOUNTER
Verified patient with two types of identifiers.  Informed patient of Dr. Wilson's recommendations.  Patient states that he has already taken his morning dose of Multaq.  Per Maria Dolores NP, patient should start amiodarone on Saturday.  Informed patient that amiodarone order has already been send to the [facility].  Patient verbalized understanding and will call with any other questions.

## 2024-11-20 NOTE — PROGRESS NOTES
Refill    Insulin Disposable Pump (V-GO 40) 40 UNIT/24HR KIT USE AS DIRECTED FOR 90 DAYS      clopidogrel (PLAVIX) 75 MG tablet Take 1 tablet by mouth Every Day      amLODIPine (NORVASC) 10 MG tablet Take 1 tablet by mouth Every Day      Multiple Vitamins-Minerals (MULTIVITAMIN ADULTS) TABS Take by mouth      hydroCHLOROthiazide (HYDRODIURIL) 25 MG tablet Take 1 tablet by mouth daily      amiodarone (CORDARONE) 200 MG tablet Take 1 tablet by mouth daily Bridge to AF ablation 90 tablet 1    fosinopril (MONOPRIL) 40 MG tablet Take 1 tablet by mouth daily (Please schedule follow up with Dr. Torres) 90 tablet 0    rosuvastatin (CRESTOR) 40 MG tablet Take 1 tablet by mouth nightly 90 tablet 1    MOUNJARO 10 MG/0.5ML SOPN SC injection Inject 0.5 mLs into the skin once a week      aspirin 81 MG EC tablet Take 1 tablet by mouth daily 30 tablet 0    apixaban (ELIQUIS) 5 MG TABS tablet Take 1 tablet by mouth 2 times daily 180 tablet 3    NOVOLOG 100 UNIT/ML injection vial INSULIN PUMP      Continuous Blood Gluc Sensor (FREESTYLE DARCY 14 DAY SENSOR) MISC USE AS DIRECTED EVERY 14 DAYS      acetaminophen (TYLENOL) 500 MG tablet Take by mouth every 6 hours as needed      Cholecalciferol 50 MCG (2000 UT) CAPS Take 1 capsule by mouth once daily      DULoxetine (CYMBALTA) 30 MG extended release capsule Take 1 capsule by mouth daily      empagliflozin (JARDIANCE) 25 MG tablet Take by mouth daily      metFORMIN (GLUCOPHAGE) 1000 MG tablet Take 1 tablet by mouth daily      pantoprazole (PROTONIX) 40 MG tablet Take by mouth nightly      miSOPROStol (CYTOTEC) 200 MCG tablet 1 tablet with meals and at bedtime Orally Four times a day      nebivolol (BYSTOLIC) 10 MG tablet Take 1 tablet by mouth daily (Patient not taking: Reported on 11/29/2024) 90 tablet 1     No current facility-administered medications for this visit.          WAYLON Seaman - NP  [facility] Cardiology  [address], [address]  [place], [place]

## 2024-11-29 ENCOUNTER — CLINICAL DOCUMENTATION (OUTPATIENT)
Age: 59
End: 2024-11-29

## 2024-11-29 ENCOUNTER — OFFICE VISIT (OUTPATIENT)
Age: 59
End: 2024-11-29
Payer: COMMERCIAL

## 2024-11-29 VITALS
DIASTOLIC BLOOD PRESSURE: 80 MMHG | OXYGEN SATURATION: 95 % | HEIGHT: 70 IN | BODY MASS INDEX: 37.94 KG/M2 | RESPIRATION RATE: 16 BRPM | WEIGHT: 265 LBS | SYSTOLIC BLOOD PRESSURE: 130 MMHG | HEART RATE: 64 BPM

## 2024-11-29 DIAGNOSIS — Z79.01 ANTICOAGULATED: ICD-10-CM

## 2024-11-29 DIAGNOSIS — I10 HTN (HYPERTENSION), BENIGN: ICD-10-CM

## 2024-11-29 DIAGNOSIS — Z95.5 HISTORY OF CORONARY ARTERY STENT PLACEMENT: ICD-10-CM

## 2024-11-29 DIAGNOSIS — I25.10 CORONARY ARTERY DISEASE INVOLVING NATIVE CORONARY ARTERY OF NATIVE HEART WITHOUT ANGINA PECTORIS: ICD-10-CM

## 2024-11-29 DIAGNOSIS — I48.3 TYPICAL ATRIAL FLUTTER (HCC): ICD-10-CM

## 2024-11-29 DIAGNOSIS — I48.0 PAROXYSMAL ATRIAL FIBRILLATION (HCC): Primary | ICD-10-CM

## 2024-11-29 DIAGNOSIS — Z98.890 S/P ABLATION OF ATRIAL FIBRILLATION: ICD-10-CM

## 2024-11-29 DIAGNOSIS — Z86.79 S/P ABLATION OF ATRIAL FIBRILLATION: ICD-10-CM

## 2024-11-29 PROCEDURE — 93010 ELECTROCARDIOGRAM REPORT: CPT | Performed by: NURSE PRACTITIONER

## 2024-11-29 PROCEDURE — 3075F SYST BP GE 130 - 139MM HG: CPT | Performed by: NURSE PRACTITIONER

## 2024-11-29 PROCEDURE — 99214 OFFICE O/P EST MOD 30 MIN: CPT | Performed by: NURSE PRACTITIONER

## 2024-11-29 PROCEDURE — 3079F DIAST BP 80-89 MM HG: CPT | Performed by: NURSE PRACTITIONER

## 2024-11-29 PROCEDURE — 93005 ELECTROCARDIOGRAM TRACING: CPT | Performed by: NURSE PRACTITIONER

## 2024-11-29 RX ORDER — AMLODIPINE BESYLATE 10 MG/1
1 TABLET ORAL
COMMUNITY

## 2024-11-29 RX ORDER — APIXABAN 5 MG/1
5 TABLET, FILM COATED ORAL 2 TIMES DAILY
Qty: 180 TABLET | Refills: 3 | Status: SHIPPED | OUTPATIENT
Start: 2024-11-29

## 2024-11-29 RX ORDER — AMIODARONE HYDROCHLORIDE 200 MG/1
200 TABLET ORAL DAILY
Qty: 90 TABLET | Refills: 1 | Status: SHIPPED | OUTPATIENT
Start: 2024-11-29

## 2024-11-29 RX ORDER — SUB-Q INSULIN DEVICE, 40 UNIT
EACH MISCELLANEOUS
COMMUNITY
Start: 2024-11-21

## 2024-11-29 RX ORDER — HYDROCHLOROTHIAZIDE 25 MG/1
25 TABLET ORAL DAILY
COMMUNITY

## 2024-11-29 RX ORDER — MISOPROSTOL 200 UG/1
TABLET ORAL
COMMUNITY

## 2024-11-29 RX ORDER — CLOPIDOGREL BISULFATE 75 MG/1
1 TABLET ORAL
COMMUNITY

## 2024-11-29 ASSESSMENT — PATIENT HEALTH QUESTIONNAIRE - PHQ9
SUM OF ALL RESPONSES TO PHQ QUESTIONS 1-9: 0
SUM OF ALL RESPONSES TO PHQ QUESTIONS 1-9: 0
SUM OF ALL RESPONSES TO PHQ9 QUESTIONS 1 & 2: 0
1. LITTLE INTEREST OR PLEASURE IN DOING THINGS: NOT AT ALL
2. FEELING DOWN, DEPRESSED OR HOPELESS: NOT AT ALL
SUM OF ALL RESPONSES TO PHQ QUESTIONS 1-9: 0
SUM OF ALL RESPONSES TO PHQ QUESTIONS 1-9: 0

## 2024-11-29 NOTE — TELEPHONE ENCOUNTER
Refill per VO of Dr. Wilson  Last appt: 12/14/2023    Future Appointments   Date Time Provider Department Center   11/29/2024  9:40 AM Nazario, Daily B, APRN - NP [facility] AM[facility]   1/9/2025  1:00 PM Daily Nazario APRN - NP [facility] AMB   2/3/2025 10:00 AM [facility] CATH LAB 3 [facility]   2/17/2025  9:00 AM Daily Nazario APRN - NP [facility] AMB       Requested Prescriptions     Pending Prescriptions Disp Refills    apixaban (ELIQUIS) 5 MG TABS tablet [Pharmacy Med Name: Eliquis 5 MG Oral Tablet] 180 tablet 3     Sig: Take 1 tablet by mouth twice daily

## 2024-11-29 NOTE — PROGRESS NOTES
Faxed office note to Dr. Ruano[facility] at fax number [phone].  Fax confirmation received.       The patient is a 87y Male complaining of abdominal pain.

## 2024-12-11 RX ORDER — FOSINOPRIL SODIUM 40 MG/1
TABLET ORAL
Qty: 90 TABLET | Refills: 0 | Status: SHIPPED | OUTPATIENT
Start: 2024-12-11

## 2024-12-11 NOTE — TELEPHONE ENCOUNTER
Requested Prescriptions     Signed Prescriptions Disp Refills    fosinopril (MONOPRIL) 40 MG tablet 90 tablet 0     Sig: TAKE 1 TABLET BY MOUTH ONCE DAILY *PLEASE  SCHEDULE  FOLLOW  UP  WITH  DR TORRES*     Authorizing Provider: BARBARA TORRES     Ordering User: ELDA REBOLLEDO     Verbal order per Dr. Torres.     Future Appointments   Date Time Provider Department Center   1/9/2025  1:00 PM Daily Nazario APRN - NP [facility]   2/3/2025 10:00 AM [facility] CATH LAB 3 [facility]   2/17/2025  9:00 AM Daily Nazario APRN - NP [facility] AMB

## 2025-01-02 NOTE — H&P (VIEW-ONLY)
[facility] Cardiology  Cardiac Electrophysiology Clinic Care Note                  []Initial visit     [x]Established visit     Patient Name: Henry Roberts - :1965 - MRN:[id number]  Primary Cardiologist: Thai Torres MD  Electrophysiologist: Chester Wilson MD     Reason for visit: H&P update    HPI:  Mr. Roberts is a 59 y.o. male who presents for H&P update prior to upcoming planned pulsed field ablation of persistent AF (scheduled for 2025).    He had recurrent AF shortly post DCCV.  Multaq was initiated, but he didn't tolerate well.  He has since transitioned to amiodarone.  Since then, he reports doing well, denies any significant palpitations or chest pain.  States AF burden per smart watch has been about 2%.    ECG on 2024 showed NSR 64 bpm with 1st degree AVB ( ms).    Holter in 2024 showed HR  bpm, avg 96 bpm; AF burden >99%.    LVEF 55-60% per ALBA in 2023 with mildly dilated LA & mild MR/AR.    BP controlled.    Anticoagulated with Eliquis, denies bleeding issues.      Previous:  S/p DCCV 2024.    S/p PAF cryoablation & persistent typical atrial flutter ablation on 2023.    S/p DCCV 2023.    LHC in 2021 showed ISR of distal LAD, severe apical ISR, severe diffuse disease in diagonal, LCx disease, & moderate RCA disease.  Revascularization deferred.    S/p AF ablation in .    S/p PCI LAD in  & .    STEPHANIE on CPAP       Assessment and Plan     PAF/persistent AFL: S/p ablation in  & DCCV in 2023; he had subsequent recurrence on Multaq.  Now s/p PAF cryoablation & persistent typical atrial flutter ablation on 2023; initially stopped amiodarone post ablation.      Holter in 2024 showed AF burden >99%.  S/p DCCV 2024, but had recurrent AF soon thereafter.  Didn't tolerate Multaq well, has since transitioned to amiodarone.  ECG today shows NSR 64 bpm with 1st degree AVB ( ms).    Continue

## 2025-01-08 NOTE — PATIENT INSTRUCTIONS
Your Pulse Field Atrial Fibrillation Ablation procedure has been scheduled for 2/3/25 at 10:00 am, at [facility].     Please report to Admitting Department by 8:00 am, or 2 hours prior to your scheduled procedure. Please bring a list of your current medications and medication bottles, if able, to the hospital on this day.  You will be unable to drive after your procedure so please make sure to bring someone with you to your procedure.     You will need to have nothing to eat or drink after midnight, the night prior to your procedure. You may have small sips of water, if needed, to take with your medication.    HOLD Mounjaro 1 week prior to your procedure.  HOLD Eliquis x 2 days prior to procedure.  Please disconnect your insulin pump the morning of procedure.

## 2025-01-09 ENCOUNTER — OFFICE VISIT (OUTPATIENT)
Age: 60
End: 2025-01-09
Payer: COMMERCIAL

## 2025-01-09 VITALS
BODY MASS INDEX: 39.91 KG/M2 | HEIGHT: 70 IN | SYSTOLIC BLOOD PRESSURE: 124 MMHG | OXYGEN SATURATION: 96 % | HEART RATE: 66 BPM | WEIGHT: 278.8 LBS | DIASTOLIC BLOOD PRESSURE: 78 MMHG

## 2025-01-09 DIAGNOSIS — I48.19 PERSISTENT ATRIAL FIBRILLATION (HCC): Primary | ICD-10-CM

## 2025-01-09 DIAGNOSIS — Z79.899 ON AMIODARONE THERAPY: ICD-10-CM

## 2025-01-09 DIAGNOSIS — I10 PRIMARY HYPERTENSION: ICD-10-CM

## 2025-01-09 DIAGNOSIS — I48.3 TYPICAL ATRIAL FLUTTER (HCC): ICD-10-CM

## 2025-01-09 DIAGNOSIS — Z98.890 S/P ABLATION OF ATRIAL FIBRILLATION: ICD-10-CM

## 2025-01-09 DIAGNOSIS — Z95.5 HISTORY OF CORONARY ARTERY STENT PLACEMENT: ICD-10-CM

## 2025-01-09 DIAGNOSIS — I48.19 PERSISTENT ATRIAL FIBRILLATION (HCC): ICD-10-CM

## 2025-01-09 DIAGNOSIS — Z86.79 S/P ABLATION OF ATRIAL FIBRILLATION: ICD-10-CM

## 2025-01-09 DIAGNOSIS — Z79.01 ANTICOAGULATED: ICD-10-CM

## 2025-01-09 DIAGNOSIS — I25.10 CORONARY ARTERY DISEASE INVOLVING NATIVE CORONARY ARTERY OF NATIVE HEART WITHOUT ANGINA PECTORIS: ICD-10-CM

## 2025-01-09 LAB
ALBUMIN SERPL-MCNC: 4.2 G/DL (ref 3.5–5)
ALBUMIN/GLOB SERPL: 1.2 (ref 1.1–2.2)
ALP SERPL-CCNC: 75 U/L (ref 45–117)
ALT SERPL-CCNC: 33 U/L (ref 12–78)
ANION GAP SERPL CALC-SCNC: 7 MMOL/L (ref 2–12)
AST SERPL-CCNC: 25 U/L (ref 15–37)
BILIRUB SERPL-MCNC: 0.6 MG/DL (ref 0.2–1)
BUN SERPL-MCNC: 21 MG/DL (ref 6–20)
BUN/CREAT SERPL: 14 (ref 12–20)
CALCIUM SERPL-MCNC: 9.6 MG/DL (ref 8.5–10.1)
CHLORIDE SERPL-SCNC: 106 MMOL/L (ref 97–108)
CO2 SERPL-SCNC: 29 MMOL/L (ref 21–32)
CREAT SERPL-MCNC: 1.49 MG/DL (ref 0.7–1.3)
ERYTHROCYTE [DISTWIDTH] IN BLOOD BY AUTOMATED COUNT: 13.6 % (ref 11.5–14.5)
GLOBULIN SER CALC-MCNC: 3.6 G/DL (ref 2–4)
GLUCOSE SERPL-MCNC: 103 MG/DL (ref 65–100)
HCT VFR BLD AUTO: 49.5 % (ref 36.6–50.3)
HGB BLD-MCNC: 15.9 G/DL (ref 12.1–17)
MCH RBC QN AUTO: 29.1 PG (ref 26–34)
MCHC RBC AUTO-ENTMCNC: 32.1 G/DL (ref 30–36.5)
MCV RBC AUTO: 90.5 FL (ref 80–99)
NRBC # BLD: 0 K/UL (ref 0–0.01)
NRBC BLD-RTO: 0 PER 100 WBC
PLATELET # BLD AUTO: 207 K/UL (ref 150–400)
PMV BLD AUTO: 9.9 FL (ref 8.9–12.9)
POTASSIUM SERPL-SCNC: 4.3 MMOL/L (ref 3.5–5.1)
PROT SERPL-MCNC: 7.8 G/DL (ref 6.4–8.2)
RBC # BLD AUTO: 5.47 M/UL (ref 4.1–5.7)
SODIUM SERPL-SCNC: 142 MMOL/L (ref 136–145)
TSH SERPL DL<=0.05 MIU/L-ACNC: 0.72 UIU/ML (ref 0.36–3.74)
WBC # BLD AUTO: 6.7 K/UL (ref 4.1–11.1)

## 2025-01-09 PROCEDURE — 3074F SYST BP LT 130 MM HG: CPT | Performed by: NURSE PRACTITIONER

## 2025-01-09 PROCEDURE — 99214 OFFICE O/P EST MOD 30 MIN: CPT | Performed by: NURSE PRACTITIONER

## 2025-01-09 PROCEDURE — 3078F DIAST BP <80 MM HG: CPT | Performed by: NURSE PRACTITIONER

## 2025-01-09 NOTE — PROGRESS NOTES
Chief Complaint   Patient presents with    Atrial Fibrillation     Vitals:    01/09/25 1300   BP: 124/78   Site: Left Upper Arm   Position: Sitting   Pulse: 66   SpO2: 96%   Weight: 126.5 kg (278 lb 12.8 oz)   Height: 1.778 m (5' 10\")         Chest pain: DENIED     Recent hospital stays: DENIED     Refills: DENIED   
involving the  anterior tibialis tendon with likely a few intact tendons. Tearing  occurs on a background of tendinosis. The bulk of the tendon is  retracted to the level of the ankle, approximately 3 to 4 cm proximal  to the normal insertion site. Remaining extensor tendons appear  intact.    Achilles tendon: Intact    Miscellaneous:  Tarsal tunnel: Unremarkable  Sinus tarsi: Edema nearly completely replacing the normal T1 fat  signal. This may be seen with sinus tarsi syndrome in the proper  clinical setting.  Plantar fascia: Proximal plantar fascia appears thickened, most likely  from prior plantar fasciitis. No significant surrounding edema that  would suggest an acute inflammatory process. Small plantar calcaneal  heel spur.    No significant ankle joint effusion. There is soft tissue edema that  surrounds the ankle, most pronounced medially and anteriorly.    IMPRESSION:  1. High-grade partial thickness tear of the distal anterior tibialis  tendon.  2. Significant arthropathy within the midfoot, most commonly related  to degenerative change, with associated marrow signal changes.  3. Sequela of multiple prior ligamentous injuries, as above.  4. Trace posterior tibialis tenosynovitis without tear.  5. Peroneus brevis tendinosis without significant tear.  6. Findings that can be seen in the setting of sinus tarsi syndrome.        Electronically Signed by GOPI IBARRA at 9/06/2023 03:42:46 PM    Read by: [facility], [facility].          Current meds:  Current Outpatient Medications   Medication Sig Dispense Refill    fosinopril (MONOPRIL) 40 MG tablet TAKE 1 TABLET BY MOUTH ONCE DAILY *PLEASE  SCHEDULE  FOLLOW  UP  WITH  DR CORNELIUS* 90 tablet 0    ELIQUIS 5 MG TABS tablet Take 1 tablet by mouth twice daily 180 tablet 3    Insulin Disposable Pump (V-GO 40) 40 UNIT/24HR KIT USE AS DIRECTED FOR 90 DAYS      Multiple Vitamins-Minerals (MULTIVITAMIN ADULTS) TABS Take by mouth      hydroCHLOROthiazide

## 2025-01-20 ENCOUNTER — PATIENT MESSAGE (OUTPATIENT)
Age: 60
End: 2025-01-20

## 2025-01-23 RX ORDER — ROSUVASTATIN CALCIUM 40 MG/1
40 TABLET, COATED ORAL NIGHTLY
Qty: 90 TABLET | Refills: 0 | Status: SHIPPED | OUTPATIENT
Start: 2025-01-23

## 2025-01-23 NOTE — TELEPHONE ENCOUNTER
VO per NP    Future Appointments   Date Time Provider Department Center   2/3/2025 10:00 AM [facility]   2/17/2025  9:00 AM Daily Nazario APRN - NP [facility]

## 2025-01-29 ENCOUNTER — PREP FOR PROCEDURE (OUTPATIENT)
Age: 60
End: 2025-01-29

## 2025-01-29 RX ORDER — SODIUM CHLORIDE 0.9 % (FLUSH) 0.9 %
5-40 SYRINGE (ML) INJECTION EVERY 12 HOURS SCHEDULED
Status: CANCELLED | OUTPATIENT
Start: 2025-01-29

## 2025-01-29 RX ORDER — SODIUM CHLORIDE 0.9 % (FLUSH) 0.9 %
5-40 SYRINGE (ML) INJECTION PRN
Status: CANCELLED | OUTPATIENT
Start: 2025-01-29

## 2025-01-29 RX ORDER — SODIUM CHLORIDE 9 MG/ML
INJECTION, SOLUTION INTRAVENOUS PRN
Status: CANCELLED | OUTPATIENT
Start: 2025-01-29

## 2025-02-03 ENCOUNTER — ANESTHESIA (OUTPATIENT)
Facility: HOSPITAL | Age: 60
End: 2025-02-03
Payer: COMMERCIAL

## 2025-02-03 ENCOUNTER — ANESTHESIA EVENT (OUTPATIENT)
Facility: HOSPITAL | Age: 60
End: 2025-02-03
Payer: COMMERCIAL

## 2025-02-03 ENCOUNTER — HOSPITAL ENCOUNTER (OUTPATIENT)
Facility: HOSPITAL | Age: 60
Discharge: HOME | End: 2025-02-03
Attending: INTERNAL MEDICINE | Admitting: INTERNAL MEDICINE
Payer: COMMERCIAL

## 2025-02-03 VITALS
OXYGEN SATURATION: 93 % | HEIGHT: 70 IN | BODY MASS INDEX: 38.65 KG/M2 | TEMPERATURE: 98.4 F | SYSTOLIC BLOOD PRESSURE: 141 MMHG | DIASTOLIC BLOOD PRESSURE: 80 MMHG | RESPIRATION RATE: 10 BRPM | HEART RATE: 66 BPM | WEIGHT: 270 LBS

## 2025-02-03 DIAGNOSIS — I48.0 PAROXYSMAL ATRIAL FIBRILLATION (HCC): ICD-10-CM

## 2025-02-03 PROBLEM — I48.91 ATRIAL FIBRILLATION/FLUTTER (HCC): Status: ACTIVE | Noted: 2025-02-03

## 2025-02-03 PROBLEM — I48.92 ATRIAL FIBRILLATION/FLUTTER (HCC): Status: ACTIVE | Noted: 2025-02-03

## 2025-02-03 LAB
ABO + RH BLD: NORMAL
ACT BLD: 204 SECS (ref 79–138)
ACT BLD: 233 SECS (ref 79–138)
ACT BLD: 245 SECS (ref 79–138)
BLOOD GROUP ANTIBODIES SERPL: NORMAL
ECHO BSA: 2.46 M2
ECHO BSA: 2.46 M2
GLUCOSE BLD STRIP.AUTO-MCNC: 138 MG/DL (ref 65–117)
SERVICE CMNT-IMP: ABNORMAL
SPECIMEN EXP DATE BLD: NORMAL

## 2025-02-03 PROCEDURE — C1769 GUIDE WIRE: HCPCS | Performed by: INTERNAL MEDICINE

## 2025-02-03 PROCEDURE — 3700000001 HC ADD 15 MINUTES (ANESTHESIA): Performed by: INTERNAL MEDICINE

## 2025-02-03 PROCEDURE — C1766 INTRO/SHEATH,STRBLE,NON-PEEL: HCPCS | Performed by: INTERNAL MEDICINE

## 2025-02-03 PROCEDURE — C1760 CLOSURE DEV, VASC: HCPCS | Performed by: INTERNAL MEDICINE

## 2025-02-03 PROCEDURE — 2580000003 HC RX 258: Performed by: NURSE PRACTITIONER

## 2025-02-03 PROCEDURE — C1730 CATH, EP, 19 OR FEW ELECT: HCPCS | Performed by: INTERNAL MEDICINE

## 2025-02-03 PROCEDURE — 76937 US GUIDE VASCULAR ACCESS: CPT | Performed by: INTERNAL MEDICINE

## 2025-02-03 PROCEDURE — 85347 COAGULATION TIME ACTIVATED: CPT

## 2025-02-03 PROCEDURE — C1733 CATH, EP, OTHR THAN COOL-TIP: HCPCS | Performed by: INTERNAL MEDICINE

## 2025-02-03 PROCEDURE — 2720000010 HC SURG SUPPLY STERILE: Performed by: INTERNAL MEDICINE

## 2025-02-03 PROCEDURE — 86850 RBC ANTIBODY SCREEN: CPT

## 2025-02-03 PROCEDURE — 93655 ICAR CATH ABLTJ DSCRT ARRHYT: CPT | Performed by: INTERNAL MEDICINE

## 2025-02-03 PROCEDURE — 2500000003 HC RX 250 WO HCPCS

## 2025-02-03 PROCEDURE — C1759 CATH, INTRA ECHOCARDIOGRAPHY: HCPCS | Performed by: INTERNAL MEDICINE

## 2025-02-03 PROCEDURE — 2709999900 HC NON-CHARGEABLE SUPPLY: Performed by: INTERNAL MEDICINE

## 2025-02-03 PROCEDURE — 86900 BLOOD TYPING SEROLOGIC ABO: CPT

## 2025-02-03 PROCEDURE — 86901 BLOOD TYPING SEROLOGIC RH(D): CPT

## 2025-02-03 PROCEDURE — 3700000000 HC ANESTHESIA ATTENDED CARE: Performed by: INTERNAL MEDICINE

## 2025-02-03 PROCEDURE — 93656 COMPRE EP EVAL ABLTJ ATR FIB: CPT | Performed by: INTERNAL MEDICINE

## 2025-02-03 PROCEDURE — 6360000002 HC RX W HCPCS

## 2025-02-03 PROCEDURE — C1894 INTRO/SHEATH, NON-LASER: HCPCS | Performed by: INTERNAL MEDICINE

## 2025-02-03 PROCEDURE — 93657 TX L/R ATRIAL FIB ADDL: CPT | Performed by: INTERNAL MEDICINE

## 2025-02-03 PROCEDURE — 36415 COLL VENOUS BLD VENIPUNCTURE: CPT

## 2025-02-03 PROCEDURE — 82962 GLUCOSE BLOOD TEST: CPT

## 2025-02-03 PROCEDURE — 2580000003 HC RX 258

## 2025-02-03 PROCEDURE — 6370000000 HC RX 637 (ALT 250 FOR IP): Performed by: INTERNAL MEDICINE

## 2025-02-03 RX ORDER — FENTANYL CITRATE 50 UG/ML
INJECTION, SOLUTION INTRAMUSCULAR; INTRAVENOUS
Status: DISCONTINUED | OUTPATIENT
Start: 2025-02-03 | End: 2025-02-03 | Stop reason: SDUPTHER

## 2025-02-03 RX ORDER — ONDANSETRON 2 MG/ML
INJECTION INTRAMUSCULAR; INTRAVENOUS
Status: DISCONTINUED | OUTPATIENT
Start: 2025-02-03 | End: 2025-02-03 | Stop reason: SDUPTHER

## 2025-02-03 RX ORDER — ROCURONIUM BROMIDE 10 MG/ML
INJECTION, SOLUTION INTRAVENOUS
Status: DISCONTINUED | OUTPATIENT
Start: 2025-02-03 | End: 2025-02-03 | Stop reason: SDUPTHER

## 2025-02-03 RX ORDER — ACETAMINOPHEN 325 MG/1
650 TABLET ORAL ONCE
Status: COMPLETED | OUTPATIENT
Start: 2025-02-03 | End: 2025-02-03

## 2025-02-03 RX ORDER — LIDOCAINE HYDROCHLORIDE 20 MG/ML
INJECTION, SOLUTION EPIDURAL; INFILTRATION; INTRACAUDAL; PERINEURAL
Status: DISCONTINUED | OUTPATIENT
Start: 2025-02-03 | End: 2025-02-03 | Stop reason: SDUPTHER

## 2025-02-03 RX ORDER — PROPOFOL 10 MG/ML
INJECTION, EMULSION INTRAVENOUS
Status: DISCONTINUED | OUTPATIENT
Start: 2025-02-03 | End: 2025-02-03 | Stop reason: SDUPTHER

## 2025-02-03 RX ORDER — DEXAMETHASONE SODIUM PHOSPHATE 4 MG/ML
INJECTION, SOLUTION INTRA-ARTICULAR; INTRALESIONAL; INTRAMUSCULAR; INTRAVENOUS; SOFT TISSUE
Status: DISCONTINUED | OUTPATIENT
Start: 2025-02-03 | End: 2025-02-03 | Stop reason: SDUPTHER

## 2025-02-03 RX ORDER — SODIUM CHLORIDE 0.9 % (FLUSH) 0.9 %
5-40 SYRINGE (ML) INJECTION PRN
Status: DISCONTINUED | OUTPATIENT
Start: 2025-02-03 | End: 2025-02-03 | Stop reason: HOSPADM

## 2025-02-03 RX ORDER — HEPARIN SODIUM 1000 [USP'U]/ML
INJECTION, SOLUTION INTRAVENOUS; SUBCUTANEOUS
Status: DISCONTINUED | OUTPATIENT
Start: 2025-02-03 | End: 2025-02-03 | Stop reason: SDUPTHER

## 2025-02-03 RX ORDER — SODIUM CHLORIDE 9 MG/ML
INJECTION, SOLUTION INTRAVENOUS PRN
Status: DISCONTINUED | OUTPATIENT
Start: 2025-02-03 | End: 2025-02-03 | Stop reason: HOSPADM

## 2025-02-03 RX ORDER — SODIUM CHLORIDE 0.9 % (FLUSH) 0.9 %
5-40 SYRINGE (ML) INJECTION EVERY 12 HOURS SCHEDULED
Status: DISCONTINUED | OUTPATIENT
Start: 2025-02-03 | End: 2025-02-03 | Stop reason: HOSPADM

## 2025-02-03 RX ORDER — PROTAMINE SULFATE 10 MG/ML
INJECTION, SOLUTION INTRAVENOUS
Status: DISCONTINUED | OUTPATIENT
Start: 2025-02-03 | End: 2025-02-03 | Stop reason: SDUPTHER

## 2025-02-03 RX ORDER — SUCCINYLCHOLINE/SOD CL,ISO/PF 200MG/10ML
SYRINGE (ML) INTRAVENOUS
Status: DISCONTINUED | OUTPATIENT
Start: 2025-02-03 | End: 2025-02-03 | Stop reason: SDUPTHER

## 2025-02-03 RX ADMIN — PROTAMINE SULFATE 50 MG: 10 INJECTION, SOLUTION INTRAVENOUS at 11:26

## 2025-02-03 RX ADMIN — PHENYLEPHRINE HYDROCHLORIDE 40 MCG/MIN: 10 INJECTION INTRAVENOUS at 09:40

## 2025-02-03 RX ADMIN — PROPOFOL 200 MG: 10 INJECTION, EMULSION INTRAVENOUS at 09:40

## 2025-02-03 RX ADMIN — HEPARIN SODIUM 10000 UNITS: 1000 INJECTION INTRAVENOUS; SUBCUTANEOUS at 10:17

## 2025-02-03 RX ADMIN — FENTANYL CITRATE 50 MCG: 50 INJECTION INTRAMUSCULAR; INTRAVENOUS at 09:40

## 2025-02-03 RX ADMIN — ROCURONIUM BROMIDE 30 MG: 10 INJECTION, SOLUTION INTRAVENOUS at 09:51

## 2025-02-03 RX ADMIN — ROCURONIUM BROMIDE 10 MG: 10 INJECTION, SOLUTION INTRAVENOUS at 10:14

## 2025-02-03 RX ADMIN — ACETAMINOPHEN 650 MG: 325 TABLET ORAL at 13:47

## 2025-02-03 RX ADMIN — FENTANYL CITRATE 50 MCG: 50 INJECTION INTRAMUSCULAR; INTRAVENOUS at 11:33

## 2025-02-03 RX ADMIN — LIDOCAINE HYDROCHLORIDE 100 MG: 20 INJECTION, SOLUTION EPIDURAL; INFILTRATION; INTRACAUDAL at 09:40

## 2025-02-03 RX ADMIN — ROCURONIUM BROMIDE 10 MG: 10 INJECTION, SOLUTION INTRAVENOUS at 10:52

## 2025-02-03 RX ADMIN — SUGAMMADEX 200 MG: 100 INJECTION, SOLUTION INTRAVENOUS at 11:28

## 2025-02-03 RX ADMIN — ONDANSETRON 4 MG: 2 INJECTION INTRAMUSCULAR; INTRAVENOUS at 10:11

## 2025-02-03 RX ADMIN — HEPARIN SODIUM 3000 UNITS: 1000 INJECTION INTRAVENOUS; SUBCUTANEOUS at 10:58

## 2025-02-03 RX ADMIN — ROCURONIUM BROMIDE 10 MG: 10 INJECTION, SOLUTION INTRAVENOUS at 09:40

## 2025-02-03 RX ADMIN — SODIUM CHLORIDE: 9 INJECTION, SOLUTION INTRAVENOUS at 09:30

## 2025-02-03 RX ADMIN — HEPARIN SODIUM 5000 UNITS: 1000 INJECTION INTRAVENOUS; SUBCUTANEOUS at 10:37

## 2025-02-03 RX ADMIN — DEXAMETHASONE SODIUM PHOSPHATE 4 MG: 4 INJECTION INTRA-ARTICULAR; INTRALESIONAL; INTRAMUSCULAR; INTRAVENOUS; SOFT TISSUE at 10:11

## 2025-02-03 RX ADMIN — Medication 200 MG: at 09:40

## 2025-02-03 NOTE — PROGRESS NOTES
Cardiac Cath Lab Recovery Arrival Note:      Henry Roberts arrived to Cardiac Cath Lab, Recovery Area. Staff introduced to patient. Patient identifiers verified with NAME and DATE OF BIRTH. Procedure verified with patient. Consent forms reviewed and signed by patient or authorized representative and verified. Allergies verified. Patient informed of procedure and plan of care. Questions answered with review. Patient prepped for procedure, per orders from physician, prior to arrival.    Patient on cardiac monitor, non-invasive blood pressure, SPO2 monitor. Patient is A&Ox 4. Patient reports no pain.     Patient in stretcher, in low position, with side rails up, call bell within reach, patient instructed to call of assistance as needed.    Patient prep in: CCL Recovery Area, Bed# 4.   Family in: waiting area.   Prep by: ADRIANA Paris RN

## 2025-02-03 NOTE — DISCHARGE INSTRUCTIONS
Patient Instructions Post-EP study and ablation    Resume eliquis tonight    1.  No heavy lifting or exercises for 1 week.  This includes the following:  Do not push or move furniture, jog or run    2.  Do not drive for 2 days.    3.  Call Dr. Wilson at [phone] if you experience any of the following symptoms:  Dizziness, lightheadedness, fainting spells  Lack of energy  Shortness of breath  Rapid heart rate  Chest or muscle twitches  Blurry vision, double vision, weakness, numbness  Nausea, vomiting  Fever  Bleeding in the stool, black stool  Leg swelling, pain    4.  Follow-up      Stop amiodarone and plavix      CHECK THE CATHETER INSERTION SITE DAILY:    If bleeding at the cath site occurs, take a clean washcloth and apply direct pressure at the puncture site for at least 15 minutes.  Call 911 immediately if the bleeding is not controlled.  Continue to apply direct pressure until an ambulance gets to your location. Do not try to drive yourself or have someone else drive you to the hospital.  Have the ambulance bring you to the emergency room.    You may shower 24 hours after your procedure. Gently remove the bandage during showering.  Wash with soap and water and pat dry.  To prevent infection, keep the groin area/insertion site clean and dry.  Do not apply powders, creams, lotions, or ointments to the site for 5 days. You may cover the site with a fresh Band-Aid each day until well healed.  You may notice a small lump at the site. This is normal and may last up to 6 weeks.    CALL THE PHYSICIAN:  If the site becomes red, swollen, or feels warm to the touch, or is healing poorly    If you note any large/extending bruise, fever >101.0 or chills  If your extremity has numbness, tingling, discoloration, abnormal swelling, tightness or pain   If you have difficulty with urination or develop nausea, vomiting, or severe abdominal pain    ACTIVITY for the first 24-48 hours, or as instructed by your

## 2025-02-03 NOTE — PROGRESS NOTES
TRANSFER - IN Cath Lab RR REPORT:    Verbal report received from Diamond and SMITHA on Henry Roberts  being received from the EP Lab for routine progression of care. Report consisted of patient’s Situation, Background, Assessment and Recommendations(SBAR). Procedural summary and MAR reviewed with receiving nurse. Opportunity for questions and clarification was provided. Assessment completed upon patient’s arrival to Cardiac Cath Lab RECOVERY AREA and care assumed.       Cardiac Cath Lab Recovery Arrival Note:    Henry Roberts arrived to CCL recovery area.  Patient procedure= Afib  Ablation. Patient on cardiac monitor, non-invasive blood pressure, SPO2 monitor. On  O2 @ 10lpm via [employer].   Patient is A&Ox 4. Patient reports no complaints.    PROCEDURE SITE CHECK:    Procedure site: No bleeding and no hematoma, no pain/discomfort reported at procedure site.     No change in patient status. Continue to monitor patient and status.

## 2025-02-03 NOTE — ANESTHESIA POSTPROCEDURE EVALUATION
Post-Anesthesia Evaluation and Assessment    Patient: Henry Roberts MRN: [id number]  SSN: [id number]    YOB: 1965  Age: 59 y.o.  Sex: male      I have evaluated the patient and they are stable and ready for discharge from the PACU.     Cardiovascular Function/Vital Signs  Visit Vitals  BP (!) 141/80   Pulse 66   Temp 98.4 °F (36.9 °C) (Oral)   Resp 10   Ht 1.778 m (5' 10\")   Wt 122.5 kg (270 lb)   SpO2 93%   BMI 38.74 kg/m²       Patient is status post General anesthesia for Procedure(s):  Pulsed Field Ablation  Ep 3d mapping  Intracardiac echocardiogram  Intravascular ultrasound.    Nausea/Vomiting: None    Postoperative hydration reviewed and adequate.    Pain:      Managed    Neurological Status:       At baseline    Mental Status, Level of Consciousness: Alert and  oriented to person, place, and time    Pulmonary Status:       Adequate oxygenation and airway patent    Complications related to anesthesia: None    Post-anesthesia assessment completed. No concerns    Signed By: Mannie Mathur MD     February 3, 2025            Department of Anesthesiology  Postprocedure Note    Patient: Henry Roberts  MRN: [id number]  YOB: 1965  Date of evaluation: 2/3/2025    Procedure Summary       Date: 02/03/25 Room / Location: [facility] CATH LAB 3 / [facility] CARDIAC CATH LAB    Anesthesia Start: 0922 Anesthesia Stop: 1154    Procedures:       Pulsed Field Ablation      Ep 3d mapping      Intracardiac echocardiogram      Intravascular ultrasound Diagnosis:       Paroxysmal atrial fibrillation (HCC)      (Paroxysmal atrial fibrillation (HCC) [I48.0])    Providers: Chester Wilson MD Responsible Provider: Mannie Mathur MD    Anesthesia Type: General ASA Status: 3            Anesthesia Type: General    Domi Phase I: Domi Score: 10    Domi Phase II:      Anesthesia Post Evaluation    No notable events documented.

## 2025-02-03 NOTE — PROGRESS NOTES
Ambulated patient to the bathroom with a steady gait with standby assist, voided without difficulty. Returned to stretcher. Vital signs stable.     Site is clean, dry, and intact. No bleeding, no hematoma.     Assisted patient in dressing/Patient dressed self.   Educated patient about their sedation precautions such as not driving, operating any machinery, or signing legal documents 24 hours post procedure.     Reviewed discharge instructions, including medications, and site care using the teach back method. Answered all questions. Verbalized understanding.     Removed peripheral IV    Escorted to discharge area in a wheelchair with all of their belongings including cell phone, clothing and valuables.    Spouse present to take patient home. Reviewed discharge instructions with spouse. Verbalized understanding.

## 2025-02-03 NOTE — PROGRESS NOTES
Cardiac Cath Lab Procedure Area Arrival Note:    Henry Roberts arrived to Cardiac Cath Lab, Procedure Area. Patient identifiers verified with NAME and DATE OF BIRTH. Procedure verified with patient. Consent forms verified. Allergies verified. Patient informed of procedure and plan of care. Questions answered with review. Patient voiced understanding of procedure and plan of care.    Patient on cardiac monitor, non-invasive blood pressure, SPO2 monitor. On RA.   Patient status doing well without problems. Patient is A&Ox 4. Patient reports no pain or SOB.     Patient medicated during procedure with orders obtained and verified by Dr. Wilson.    Refer to patients Cardiac Cath Lab PROCEDURE REPORT for vital signs, assessment, status, and response during procedure, printed at end of case. Printed report on chart or scanned into chart.

## 2025-02-03 NOTE — PROCEDURES
PROCEDURE NOTE  Date: 2/3/2025   Name: Henry Roberts  YOB: 1965    Procedures  Cardiac Procedure Note   Patient: Henry Roberts  MRN: [id number]  SSN: [id number]   YOB: 1965 Age: 59 y.o.  Sex: male    Date of Procedure: 2/3/2025   Pre-procedure Diagnosis: Persistent Atrial Fibrillation/Atrial Flutter RVR, amiodarone, previous ablations x 2 in 2015 and 2023  Post-procedure Diagnosis: same  Procedure: EP Study and Ablation atrial fibrillation, substrates and atypical atrial flutter  :  Dr. Chester Wilson MD    Assistant(s):  None  Anesthesia: general  Estimated Blood Loss: Less than 10 mL   Specimens Removed: None  Findings:   LSPV and RIPV have residual PV potentials  All 4 veins were isolated with bidirectional line of block with PFA  Due to atypical atrial flutter and 2 other ablations in the past by PVIs, (RF and cryo), extensive posterior, anterior LA and LA roof and mitral isthmus and mitral annulus were ablated with PFA  SVC did not have potentials  CS and lateral wall pacing and recording showed bidirectional line of block at CTI for typical atrial flutter so no more ablations for typical atrial flutter  RVOT recording  Complications: None   Implants:  None  Signed by:  Chester Wilson MD  2/3/2025  11:36 AM

## 2025-02-03 NOTE — INTERVAL H&P NOTE
Update History & Physical    The patient's History and Physical of January 9, 2025 was reviewed with the patient and I examined the patient. There was no change. The surgical site was confirmed by the patient and me.     Plan: The risks, benefits, expected outcome, and alternative to the recommended procedure have been discussed with the patient. Patient understands and wants to proceed with the procedure.     Electronically signed by Chester Wilson MD on 2/3/2025 at 9:17 AM

## 2025-02-03 NOTE — ANESTHESIA PRE PROCEDURE
01:48 PM    K 4.3 01/09/2025 01:48 PM     01/09/2025 01:48 PM    CO2 29 01/09/2025 01:48 PM    BUN 21 01/09/2025 01:48 PM    CREATININE 1.49 01/09/2025 01:48 PM    GFRAA >60 02/18/2022 04:55 AM    AGRATIO 1.1 02/07/2022 09:30 AM    LABGLOM 54 01/09/2025 01:48 PM    LABGLOM 50 11/09/2023 10:42 AM    GLUCOSE 103 01/09/2025 01:48 PM    CALCIUM 9.6 01/09/2025 01:48 PM    BILITOT 0.6 01/09/2025 01:48 PM    ALKPHOS 75 01/09/2025 01:48 PM    ALKPHOS 65 02/07/2022 09:30 AM    AST 25 01/09/2025 01:48 PM    ALT 33 01/09/2025 01:48 PM       POC Tests: No results for input(s): \"POCGLU\", \"POCNA\", \"POCK\", \"POCCL\", \"POCBUN\", \"POCHEMO\", \"POCHCT\" in the last 72 hours.    Coags: No results found for: \"PROTIME\", \"INR\", \"APTT\"    HCG (If Applicable): No results found for: \"PREGTESTUR\", \"PREGSERUM\", \"HCG\", \"HCGQUANT\"     ABGs: No results found for: \"PHART\", \"PO2ART\", \"BYD7EVO\", \"CKP0XNO\", \"BEART\", \"X9ZXDIAW\"     Type & Screen (If Applicable):  Lab Results   Component Value Date    ABORH A POSITIVE 11/27/2023    LABANTI NEG 11/27/2023       Drug/Infectious Status (If Applicable):  No results found for: \"HIV\", \"HEPCAB\"    COVID-19 Screening (If Applicable):   Lab Results   Component Value Date/Time    COVID19 Not detected 02/14/2022 11:53 AM           Anesthesia Evaluation  Patient summary reviewed and Nursing notes reviewed  Airway: Mallampati: II  TM distance: >3 FB   Neck ROM: full  Mouth opening: > = 3 FB   Dental: normal exam         Pulmonary:normal exam    (+)     sleep apnea: on CPAP,                                  Cardiovascular:  Exercise tolerance: good (>4 METS)  (+) hypertension:, past MI:, CAD:, CABG/stent:, dysrhythmias: atrial fibrillation and atrial flutter                  Neuro/Psych:   Negative Neuro/Psych ROS              GI/Hepatic/Renal:   (+) renal disease: CRI          Endo/Other:    (+) DiabetesType II DM.                 Abdominal: normal exam            Vascular: negative vascular ROS.         Other

## 2025-02-03 NOTE — PROGRESS NOTES
EP LAB to Recovery Room Report    Procedure: A-Fib Ablation    MD: CHARITO Wilson MD  Pre-procedure heart rhythm: Sinus Lucius  Rhythm   Verbal Report given to Recovery Nurse on patient being transferred to Recovery Room for routine post-op. Patient stable upon transfer to .  Pt had general sedation with Anesthesia team, who managed MAR, vitals, and airway. Vitals, mental status, MAR, procedural summary discussed with recovery RN.           Post-procedure heart rhythm: Sinus Lucius  Rhythm      Sheaths:    Right femoral vein 8fr sheath removed at 1128 am, closed with perclose.    Left femoral vein 11fr sheath removed at 1131 am, closed with perclose.

## 2025-02-06 NOTE — PROGRESS NOTES
[facility] Cardiology  Cardiac Electrophysiology Clinic Care Note                  []Initial visit     [x]Established visit     Patient Name: Henry Roberts - :1965 - MRN:[id number]  Primary Cardiologist: Thai Torres MD  Electrophysiologist: Chester Wilson MD     Reason for visit: PAF ablation follow up    HPI:  Mr. Roberts is a 59 y.o. male who presents for follow up, is s/p pulsed field PVI, LA roof, LA posterior wall, LA anterior wall, mitral isthmus, & mitral annulus ablation on 2025.    No longer on amiodarone post ablation, but continues nebivolol.  He reports a single episode of tachycardia with rates up to 130s, states it lasted about 30-45 minutes, then resolved.  Noted some initial chest discomfort with deep inspiration, but this improved after a few days.    ECG today shows NSR 68 bpm.    Holter in 2024 showed HR  bpm, avg 96 bpm; AF burden >99%.    LVEF 55-60% per ALBA in 2023 with mildly dilated LA & mild MR/AR.    BP controlled.    Anticoagulated with Eliquis, denies bleeding issues.      Previous:  S/p pulsed field PVI, LA roof, LA posterior wall, LA anterior wall, mitral isthmus, & mitral annulus ablation on 2025.    Unable to tolerate Multaq.    S/p DCCV 2024.    S/p PAF cryoablation & persistent typical atrial flutter ablation on 2023.    S/p DCCV 2023.    LHC in 2021 showed ISR of distal LAD, severe apical ISR, severe diffuse disease in diagonal, LCx disease, & moderate RCA disease.  Revascularization deferred.    S/p AF ablation in .    S/p PCI LAD in  & .    STEPHANIE on CPAP.       Assessment and Plan     PAF/persistent AFL: S/p ablation in  & DCCV in 2023; he had subsequent recurrence on Multaq.  Underwent PAF cryoablation & persistent typical atrial flutter ablation on 2023; initially stopped amiodarone post ablation.      Holter in 2024 showed AF burden >99%.  S/p DCCV 2024, but had

## 2025-02-17 ENCOUNTER — OFFICE VISIT (OUTPATIENT)
Age: 60
End: 2025-02-17
Payer: COMMERCIAL

## 2025-02-17 VITALS
HEIGHT: 70 IN | BODY MASS INDEX: 38.22 KG/M2 | HEART RATE: 68 BPM | WEIGHT: 267 LBS | SYSTOLIC BLOOD PRESSURE: 126 MMHG | OXYGEN SATURATION: 96 % | DIASTOLIC BLOOD PRESSURE: 70 MMHG

## 2025-02-17 DIAGNOSIS — I34.0 NONRHEUMATIC MITRAL VALVE REGURGITATION: ICD-10-CM

## 2025-02-17 DIAGNOSIS — Z86.79 S/P ABLATION OF ATRIAL FLUTTER: ICD-10-CM

## 2025-02-17 DIAGNOSIS — I10 PRIMARY HYPERTENSION: ICD-10-CM

## 2025-02-17 DIAGNOSIS — Z95.5 HISTORY OF CORONARY ARTERY STENT PLACEMENT: ICD-10-CM

## 2025-02-17 DIAGNOSIS — I48.0 PAROXYSMAL A-FIB (HCC): Primary | ICD-10-CM

## 2025-02-17 DIAGNOSIS — Z98.890 S/P ABLATION OF ATRIAL FLUTTER: ICD-10-CM

## 2025-02-17 DIAGNOSIS — I48.3 TYPICAL ATRIAL FLUTTER (HCC): ICD-10-CM

## 2025-02-17 DIAGNOSIS — Z79.01 ANTICOAGULATED: ICD-10-CM

## 2025-02-17 DIAGNOSIS — Z98.890 S/P ABLATION OF ATRIAL FIBRILLATION: ICD-10-CM

## 2025-02-17 DIAGNOSIS — Z86.79 S/P ABLATION OF ATRIAL FIBRILLATION: ICD-10-CM

## 2025-02-17 DIAGNOSIS — I25.10 CORONARY ARTERY DISEASE INVOLVING NATIVE CORONARY ARTERY OF NATIVE HEART WITHOUT ANGINA PECTORIS: ICD-10-CM

## 2025-02-17 DIAGNOSIS — I36.1 NONRHEUMATIC TRICUSPID VALVE REGURGITATION: ICD-10-CM

## 2025-02-17 PROCEDURE — 3078F DIAST BP <80 MM HG: CPT | Performed by: NURSE PRACTITIONER

## 2025-02-17 PROCEDURE — 3074F SYST BP LT 130 MM HG: CPT | Performed by: NURSE PRACTITIONER

## 2025-02-17 PROCEDURE — 93000 ELECTROCARDIOGRAM COMPLETE: CPT | Performed by: NURSE PRACTITIONER

## 2025-02-17 PROCEDURE — 99214 OFFICE O/P EST MOD 30 MIN: CPT | Performed by: NURSE PRACTITIONER

## 2025-02-17 NOTE — PATIENT INSTRUCTIONS
Schedule echocardiogram next available.    Follow up with Dr. Torres in 3 months.    Follow up with Dr. Wilson in 6 months.

## 2025-02-17 NOTE — PROGRESS NOTES
Chief Complaint   Patient presents with    Atrial Fibrillation     PAF     Vitals:    02/17/25 0901   BP: 126/70   Site: Left Upper Arm   Position: Sitting   Pulse: 68   SpO2: 96%   Weight: 121.1 kg (267 lb)   Height: 1.778 m (5' 10\")         Chest pain: DENIED     Recent hospital stays: DENIED     Refills: DENIED

## 2025-02-20 RX ORDER — HYDROCHLOROTHIAZIDE 25 MG/1
25 TABLET ORAL DAILY
Qty: 90 TABLET | Refills: 3 | Status: SHIPPED | OUTPATIENT
Start: 2025-02-20

## 2025-02-20 NOTE — TELEPHONE ENCOUNTER
OK to refill as long as he confirms that he's been taking it.  It may have been discontinued previously when he was on an AAD due to associated risk of hypokalemia, but this is no longer the case.

## 2025-02-20 NOTE — TELEPHONE ENCOUNTER
Telephone call made to patient. Two patient identifiers verified.   Pt is still taking this medication.    Requested Prescriptions     Signed Prescriptions Disp Refills    hydroCHLOROthiazide (HYDRODIURIL) 25 MG tablet 90 tablet 3     Sig: Take 1 tablet by mouth once daily     Authorizing Provider: THAI TORRES     Ordering User: DONIS CORONA per NP    Future Appointments   Date Time Provider Department Center   3/28/2025  9:00 AM [facility] ECHO 3 JOHAN BS AMB   5/6/2025  9:00 [facility]   9/24/2025 10:40 AM Chester Wilson MD [facility] BS AMB

## 2025-03-10 RX ORDER — NEBIVOLOL 10 MG/1
10 TABLET ORAL DAILY
Qty: 90 TABLET | Refills: 1 | Status: SHIPPED | OUTPATIENT
Start: 2025-03-10

## 2025-03-10 NOTE — TELEPHONE ENCOUNTER
Cardiologist: Dr. Torres      Future Appointments   Date Time Provider Department Center   3/28/2025  9:00 AM [facility] ECHO 3 [facility] BS AMB   [facility]2025  [facility]   9/24/2025 10:40 AM Chester Wilson MD [facility] BS AMB       Requested Prescriptions     Signed Prescriptions Disp Refills    nebivolol (BYSTOLIC) 10 MG tablet 90 tablet 1     Sig: Take 1 tablet by mouth once daily     Authorizing Provider: THAI TORRES     Ordering User: ELDER GUERRERO         Refills VO per Dr. Torres.

## 2025-03-13 RX ORDER — FOSINOPRIL SODIUM 40 MG/1
40 TABLET ORAL DAILY
Qty: 90 TABLET | Refills: 0 | Status: SHIPPED | OUTPATIENT
Start: 2025-03-13

## 2025-03-13 NOTE — TELEPHONE ENCOUNTER
Requested Prescriptions     Signed Prescriptions Disp Refills    fosinopril (MONOPRIL) 40 MG tablet 90 tablet 0     Sig: Take 1 tablet by mouth daily     Authorizing Provider: THAI TORRES     Ordering User: ELDA REBOLLEDO     Verbal order per Dr. Torres.    Future Appointments   Date Time Provider Department Center   3/28/2025  9:00 AM [facility] ECHO 3 [facility] BS AMB   5/6/2025  9:00 AM Thai Torres MD [facility]   9/24/2025 10:40 AM Chester Wilson MD [facility] AMB

## 2025-03-31 ENCOUNTER — TELEPHONE (OUTPATIENT)
Age: 60
End: 2025-03-31

## 2025-03-31 NOTE — TELEPHONE ENCOUNTER
----- Message from Dr. Chester Wilson MD sent at 3/29/2025 11:19 AM EDT -----    Echo 3/2025  Difficult visualization   NSR first degree av block  LA dilated  Calcified aortic valve but measurement is mild for AS and AR      Future Appointments  5/6/2025   9:00 AM    Thai Torres MD [facility]              BS AMB  9/24/2025  10:40 AM   Chester Wilson MD           [facility]               BS AMB

## 2025-04-01 NOTE — TELEPHONE ENCOUNTER
The 1st degree AVB may or may not progress in the future, but it's something that we'll keep in mind when using medication to control atrial fibrillation.  The medications that we utilize for rate & rhythm control slow normal conduction as well.    Regarding the aortic valve, we don't change treatment plans for mild stenosis or regurgitation, but it's something we'll continue to monitor via serial echocardiograms.  It may or may not progress over the course of years.    I don't think there were any red flags noted.

## 2025-04-23 NOTE — TELEPHONE ENCOUNTER
Labs dated 3/14/25  Bun 24  Creatinine 1.35  Na 140  K 4.3  Ca 9.5  Triglycerides 258  Total Chol 127  HDL 39  LDL 40  A1c 7.2    Printed labs for review.

## 2025-04-24 RX ORDER — ROSUVASTATIN CALCIUM 40 MG/1
40 TABLET, COATED ORAL NIGHTLY
Qty: 90 TABLET | Refills: 1 | Status: SHIPPED | OUTPATIENT
Start: 2025-04-24

## 2025-04-24 NOTE — TELEPHONE ENCOUNTER
Future Appointments   Date Time Provider Department Center   5/6/2025  9:00 AM Thai Torres MD [facility] BS AMB   9/24/2025 10:40 AM Chester Wilson MD [facility] BS AMB

## 2025-04-29 ENCOUNTER — CLINICAL DOCUMENTATION (OUTPATIENT)
Age: 60
End: 2025-04-29

## 2025-04-29 NOTE — PROGRESS NOTES
Received labs dated 03/14/2025.    Glu 155  BUN 24  Cr 1.35  Na 140  K 4.3    Tchol 127  Tri 258  HDL 39  LDL 49    Hgb A1c 7.2

## 2025-05-06 ENCOUNTER — OFFICE VISIT (OUTPATIENT)
Age: 60
End: 2025-05-06
Payer: COMMERCIAL

## 2025-05-06 VITALS
HEART RATE: 70 BPM | OXYGEN SATURATION: 97 % | SYSTOLIC BLOOD PRESSURE: 118 MMHG | BODY MASS INDEX: 38.31 KG/M2 | HEIGHT: 70 IN | DIASTOLIC BLOOD PRESSURE: 70 MMHG

## 2025-05-06 DIAGNOSIS — I48.3 TYPICAL ATRIAL FLUTTER (HCC): Primary | ICD-10-CM

## 2025-05-06 DIAGNOSIS — I48.0 PAROXYSMAL A-FIB (HCC): ICD-10-CM

## 2025-05-06 DIAGNOSIS — I25.10 CORONARY ARTERY DISEASE INVOLVING NATIVE CORONARY ARTERY OF NATIVE HEART WITHOUT ANGINA PECTORIS: ICD-10-CM

## 2025-05-06 PROCEDURE — 99214 OFFICE O/P EST MOD 30 MIN: CPT | Performed by: SPECIALIST

## 2025-05-06 PROCEDURE — 93000 ELECTROCARDIOGRAM COMPLETE: CPT | Performed by: SPECIALIST

## 2025-05-06 PROCEDURE — 3074F SYST BP LT 130 MM HG: CPT | Performed by: SPECIALIST

## 2025-05-06 PROCEDURE — 3078F DIAST BP <80 MM HG: CPT | Performed by: SPECIALIST

## 2025-05-06 PROCEDURE — G2211 COMPLEX E/M VISIT ADD ON: HCPCS | Performed by: SPECIALIST

## 2025-05-06 RX ORDER — TIRZEPATIDE 12.5 MG/.5ML
INJECTION, SOLUTION SUBCUTANEOUS
COMMUNITY
Start: 2025-04-18

## 2025-05-06 RX ORDER — ATENOLOL 25 MG/1
TABLET ORAL
Qty: 1 TABLET | Refills: 0 | Status: SHIPPED | OUTPATIENT
Start: 2025-05-06

## 2025-05-06 ASSESSMENT — PATIENT HEALTH QUESTIONNAIRE - PHQ9
SUM OF ALL RESPONSES TO PHQ QUESTIONS 1-9: 0
1. LITTLE INTEREST OR PLEASURE IN DOING THINGS: NOT AT ALL
2. FEELING DOWN, DEPRESSED OR HOPELESS: NOT AT ALL
SUM OF ALL RESPONSES TO PHQ QUESTIONS 1-9: 0

## 2025-05-06 NOTE — PATIENT INSTRUCTIONS
An order has been placed for you for a Coronary CTA . Contact Central Scheduling at [phone] to set this up.     You will need to take a medication called Atenolol for this procedure to be sure your heart rate stays around 60 beats per minute. You will need to take Atenolol 25mg the morning of your procedure. This has been sent to your pharmacy. Take your Bystolic as normal.     Follow up about 1 week after testing and have a carotid ultrasound the same day.

## 2025-05-06 NOTE — PROGRESS NOTES
[facility]                                     DIVISION OF CARDIOLOGY                                                                             OFFICE NOTE                  BARBARA CORNELIUS M.D. , LYNETTE TOSCANO   1965  [id number]    Date/Time:  5/6/20258:23 AM      There were no vitals taken for this visit.       Wt Readings from Last 3 Encounters:   03/28/25 121.1 kg (267 lb)   02/17/25 121.1 kg (267 lb)   02/03/25 122.5 kg (270 lb)          Lab Results   Component Value Date    CHOL 154 05/17/2023    TRIG 288 (H) 05/17/2023    HDL 44 05/17/2023    VLDL 57.6 05/17/2023    CHOLHDLRATIO 3.5 05/17/2023              SUBJECTIVE:  He is doing  well no reported chest pain no shortness of breath he has now retired but remains active.  His main complaint is actually significant fatigue which is inconsistent but at times occurring after activities       Assessment/Plan    1.  CAD: S/p PCI LAD in 2009 & 2011.     LHC in 12/2021 showed ISR of distal LAD, severe apical ISR, severe diffuse disease in diagonal, LCx disease, & moderate RCA disease. Revascularization deferred    EKG with normal sinus rhythm no acute ischemic changes and poor R wave progression anteroseptal leads.    We have discussed fatigue as a potential symptom of coronary disease.    In my opinion reevaluation with a stress test may not be necessarily helpful.    No clear indications at this point for cardiac catheterization.    Proceed with CCTA the patient is nonetheless aware of potential limitation of CCTA in patients with previous stents.  He is also aware if abnormal may require cardiac catheterization.    Continue medical therapy at this point continue Crestor and aspirin Bystolic.    2.  Paroxysmal atrial fibrillation: He is currently in normal sinus rhythm EKG once again shows normal sinus rhythm status post atrial fibrillation ablation in February 2025 that was his third ablation.    Previous

## 2025-07-02 ENCOUNTER — OFFICE VISIT (OUTPATIENT)
Age: 60
End: 2025-07-02

## 2025-07-02 VITALS
HEIGHT: 70 IN | WEIGHT: 271 LBS | OXYGEN SATURATION: 98 % | BODY MASS INDEX: 38.8 KG/M2 | HEART RATE: 71 BPM | TEMPERATURE: 98.2 F | RESPIRATION RATE: 16 BRPM | SYSTOLIC BLOOD PRESSURE: 122 MMHG | DIASTOLIC BLOOD PRESSURE: 70 MMHG

## 2025-07-02 DIAGNOSIS — L03.039 PARONYCHIA OF GREAT TOE: Primary | ICD-10-CM

## 2025-07-02 RX ORDER — DOXYCYCLINE HYCLATE 100 MG
100 TABLET ORAL 2 TIMES DAILY
Qty: 20 TABLET | Refills: 0 | Status: SHIPPED | OUTPATIENT
Start: 2025-07-02 | End: 2025-07-02

## 2025-07-02 RX ORDER — DOXYCYCLINE HYCLATE 100 MG
100 TABLET ORAL 2 TIMES DAILY
Qty: 20 TABLET | Refills: 0 | Status: SHIPPED | OUTPATIENT
Start: 2025-07-02 | End: 2025-07-12

## 2025-07-02 NOTE — PROGRESS NOTES
2025   Henry Roberts (: 1965) is a 60 y.o. male, New patient, here for evaluation of the following chief complaint(s):  Toe Pain (Patient presents for redness and swelling of bilateral big toes which started yesterday. Patient started feeling mild pain today. Patient is diabetic. )       ASSESSMENT/PLAN:  Below is the assessment and plan developed based on review of pertinent history, physical exam, labs, studies, and medications.  1. Paronychia of great toe    Bilateral great toes:    Since diabetic and infection seemingly developed quickly with cover with both doxycycline and augmentin and have him follow up with podiatry tomorrow.  Recommend very low threshold for ED.           Handout given with care instructions  Pt with stable VS, non-toxic appearing  Pt in no acute distress and afebrile   4.   OTC for symptom management. Increase fluid intake, ensure adequate nutritional intake.  5.   Follow up with PCP as needed.  6.   Go to ED with development of any acute symptoms.     Follow up:  Return if symptoms worsen or fail to improve.  Follow up immediately for any new, worsening or changes or if symptoms are not improving over the next 5-7 days.     SUBJECTIVE/OBJECTIVE:  HPI       Toe Pain (Patient presents for redness and swelling of bilateral big toes which started yesterday. Patient started feeling mild pain today. Patient is diabetic. )  Known peripheral neuropathy.  Pt started wearing a new pair of shoes -- same type as previously.    Review of Systems   Constitutional:  Negative for chills, fatigue and fever.   Musculoskeletal:  Positive for arthralgias, joint swelling and myalgias.       Physical Exam  Constitutional:       General: He is not in acute distress.     Appearance: Normal appearance. He is not ill-appearing or toxic-appearing.   Musculoskeletal:        Feet:    Feet:      Right foot:      Skin integrity: Erythema, warmth and dry skin present.      Toenail Condition: Right toenails

## 2025-07-02 NOTE — PATIENT INSTRUCTIONS
Follow up with Podiatry as soon as possible    Please have low threshold for going to the Emergency Department - for uncontrolled fevers, chills, nausea, vomiting, increasing symptoms.    Thank you for choosing [facility].  I hope you feel better soon!

## 2025-07-16 ENCOUNTER — TELEPHONE (OUTPATIENT)
Age: 60
End: 2025-07-16

## 2025-07-16 NOTE — TELEPHONE ENCOUNTER
Spoke to patient and scheduled appointment.     Future Appointments   Date Time Provider Department Center   7/23/2025  7:00 AM [facility] CT 1 [facility]   8/20/2025  2:00 PM [facility] AMB   8/20/2025  3:20 PM Thai Torres MD [facility] AMB   9/11/2025  1:20 PM Serena Lucas MD [facility] BS AMB       Patient confirmed understanding.    Thanks,  Griselda

## 2025-07-22 RX ORDER — IOPAMIDOL 755 MG/ML
100 INJECTION, SOLUTION INTRAVASCULAR
Status: COMPLETED | OUTPATIENT
Start: 2025-07-22 | End: 2025-07-23

## 2025-07-23 ENCOUNTER — HOSPITAL ENCOUNTER (OUTPATIENT)
Facility: HOSPITAL | Age: 60
Discharge: HOME | End: 2025-07-26
Attending: SPECIALIST
Payer: COMMERCIAL

## 2025-07-23 VITALS — HEART RATE: 66 BPM | DIASTOLIC BLOOD PRESSURE: 80 MMHG | SYSTOLIC BLOOD PRESSURE: 134 MMHG | OXYGEN SATURATION: 94 %

## 2025-07-23 DIAGNOSIS — I25.10 CORONARY ARTERY DISEASE INVOLVING NATIVE CORONARY ARTERY OF NATIVE HEART WITHOUT ANGINA PECTORIS: ICD-10-CM

## 2025-07-23 LAB — CREAT BLD-MCNC: 1.5 MG/DL (ref 0.6–1.3)

## 2025-07-23 PROCEDURE — 75574 CT ANGIO HRT W/3D IMAGE: CPT | Performed by: INTERNAL MEDICINE

## 2025-07-23 PROCEDURE — 82565 ASSAY OF CREATININE: CPT

## 2025-07-23 PROCEDURE — 6370000000 HC RX 637 (ALT 250 FOR IP): Performed by: INTERNAL MEDICINE

## 2025-07-23 PROCEDURE — 6360000004 HC RX CONTRAST MEDICATION: Performed by: SPECIALIST

## 2025-07-23 PROCEDURE — 75574 CT ANGIO HRT W/3D IMAGE: CPT

## 2025-07-23 RX ORDER — NITROGLYCERIN 0.4 MG/1
0.8 TABLET SUBLINGUAL ONCE
Status: COMPLETED | OUTPATIENT
Start: 2025-07-23 | End: 2025-07-23

## 2025-07-23 RX ADMIN — IOPAMIDOL 80 ML: 755 INJECTION, SOLUTION INTRAVENOUS at 07:42

## 2025-07-23 RX ADMIN — NITROGLYCERIN 0.8 MG: 0.4 TABLET SUBLINGUAL at 07:36

## 2025-07-23 NOTE — PROGRESS NOTES
0705  Patient brought to [facility] from waiting room for Coronary CT. Patient verified and pre-med taken and allergies confirmed. VS taken and stable. Ultrasound guided 20 G PIV placed in Right FA. iStat collected and sent. CT staff aware patient is ready for exam. Nirtoglycerin 0.8 mg SL given prior to CT. Patient ambulated to CT. Patient connected ECG and contrast injector.   0745   VS taken post procedure and stable. Patient denies pain or dizziness. Encouraged drink water and check for headache or dizziness. PIV removed. Gauze and coban applied. Patient ambulated to [facility] for discharge.

## 2025-08-20 ENCOUNTER — OFFICE VISIT (OUTPATIENT)
Age: 60
End: 2025-08-20
Payer: COMMERCIAL

## 2025-08-20 ENCOUNTER — TELEPHONE (OUTPATIENT)
Age: 60
End: 2025-08-20

## 2025-08-20 VITALS
HEART RATE: 68 BPM | SYSTOLIC BLOOD PRESSURE: 118 MMHG | HEIGHT: 70 IN | DIASTOLIC BLOOD PRESSURE: 68 MMHG | BODY MASS INDEX: 38.94 KG/M2 | WEIGHT: 272 LBS | OXYGEN SATURATION: 95 %

## 2025-08-20 DIAGNOSIS — Z01.812 PRE-PROCEDURE LAB EXAM: ICD-10-CM

## 2025-08-20 DIAGNOSIS — R93.1 ABNORMAL COMPUTED TOMOGRAPHY ANGIOGRAPHY OF HEART: ICD-10-CM

## 2025-08-20 DIAGNOSIS — I25.10 CORONARY ARTERY DISEASE INVOLVING NATIVE CORONARY ARTERY OF NATIVE HEART WITHOUT ANGINA PECTORIS: Primary | ICD-10-CM

## 2025-08-20 DIAGNOSIS — E78.49 OTHER HYPERLIPIDEMIA: ICD-10-CM

## 2025-08-20 DIAGNOSIS — R93.1 ABNORMAL COMPUTED TOMOGRAPHY ANGIOGRAPHY OF HEART: Primary | ICD-10-CM

## 2025-08-20 PROCEDURE — G2211 COMPLEX E/M VISIT ADD ON: HCPCS | Performed by: SPECIALIST

## 2025-08-20 PROCEDURE — 3078F DIAST BP <80 MM HG: CPT | Performed by: SPECIALIST

## 2025-08-20 PROCEDURE — 99214 OFFICE O/P EST MOD 30 MIN: CPT | Performed by: SPECIALIST

## 2025-08-20 PROCEDURE — 3074F SYST BP LT 130 MM HG: CPT | Performed by: SPECIALIST

## 2025-08-20 RX ORDER — BLOOD SUGAR DIAGNOSTIC
STRIP MISCELLANEOUS
COMMUNITY
Start: 2025-06-22

## 2025-08-20 ASSESSMENT — PATIENT HEALTH QUESTIONNAIRE - PHQ9
SUM OF ALL RESPONSES TO PHQ QUESTIONS 1-9: 0
SUM OF ALL RESPONSES TO PHQ QUESTIONS 1-9: 0
2. FEELING DOWN, DEPRESSED OR HOPELESS: NOT AT ALL
SUM OF ALL RESPONSES TO PHQ QUESTIONS 1-9: 0
1. LITTLE INTEREST OR PLEASURE IN DOING THINGS: NOT AT ALL
SUM OF ALL RESPONSES TO PHQ QUESTIONS 1-9: 0

## 2025-08-20 ASSESSMENT — LIFESTYLE VARIABLES
HOW OFTEN DO YOU HAVE A DRINK CONTAINING ALCOHOL: NEVER
HOW MANY STANDARD DRINKS CONTAINING ALCOHOL DO YOU HAVE ON A TYPICAL DAY: PATIENT DOES NOT DRINK

## 2025-08-27 DIAGNOSIS — I25.10 CORONARY ARTERY DISEASE INVOLVING NATIVE CORONARY ARTERY OF NATIVE HEART WITHOUT ANGINA PECTORIS: ICD-10-CM

## 2025-08-27 DIAGNOSIS — Z01.812 PRE-PROCEDURE LAB EXAM: ICD-10-CM

## 2025-08-27 DIAGNOSIS — E78.49 OTHER HYPERLIPIDEMIA: ICD-10-CM

## 2025-08-27 LAB
ALBUMIN SERPL-MCNC: 4.1 G/DL (ref 3.5–5.2)
ALBUMIN/GLOB SERPL: 1.2 (ref 1.1–2.2)
ALP SERPL-CCNC: 65 U/L (ref 40–129)
ALT SERPL-CCNC: 28 U/L (ref 10–50)
ANION GAP SERPL CALC-SCNC: 11 MMOL/L (ref 2–14)
AST SERPL-CCNC: 32 U/L (ref 10–50)
BILIRUB SERPL-MCNC: 0.7 MG/DL (ref 0–1.2)
BUN SERPL-MCNC: 21 MG/DL (ref 6–20)
CALCIUM SERPL-MCNC: 9.8 MG/DL (ref 8.6–10)
CHLORIDE SERPL-SCNC: 101 MMOL/L (ref 98–107)
CHOLEST SERPL-MCNC: 145 MG/DL (ref 0–200)
CO2 SERPL-SCNC: 29 MMOL/L (ref 20–29)
COMMENT:: NORMAL
CREAT SERPL-MCNC: 1.29 MG/DL (ref 0.7–1.2)
ERYTHROCYTE [DISTWIDTH] IN BLOOD BY AUTOMATED COUNT: 13.2 % (ref 11.5–14.5)
GLOBULIN SER CALC-MCNC: 3.5 G/DL (ref 2–4)
GLUCOSE SERPL-MCNC: 190 MG/DL (ref 65–100)
HCT VFR BLD AUTO: 50.2 % (ref 36.6–50.3)
HDLC SERPL-MCNC: 41 MG/DL (ref 40–60)
HDLC SERPL: 3.6 (ref 0–5)
HGB BLD-MCNC: 16.3 G/DL (ref 12.1–17)
LDLC SERPL CALC-MCNC: 41 MG/DL (ref 0–100)
MCH RBC QN AUTO: 29.1 PG (ref 26–34)
MCHC RBC AUTO-ENTMCNC: 32.5 G/DL (ref 30–36.5)
MCV RBC AUTO: 89.5 FL (ref 80–99)
NRBC # BLD: 0 K/UL (ref 0–0.01)
NRBC BLD-RTO: 0 PER 100 WBC
PLATELET # BLD AUTO: 197 K/UL (ref 150–400)
PMV BLD AUTO: 10.1 FL (ref 8.9–12.9)
POTASSIUM SERPL-SCNC: 4.5 MMOL/L (ref 3.5–5.1)
PROT SERPL-MCNC: 7.6 G/DL (ref 6.4–8.3)
RBC # BLD AUTO: 5.61 M/UL (ref 4.1–5.7)
SODIUM SERPL-SCNC: 141 MMOL/L (ref 136–145)
SPECIMEN HOLD: NORMAL
TRIGL SERPL-MCNC: 317 MG/DL (ref 0–150)
VLDLC SERPL CALC-MCNC: 63 MG/DL
WBC # BLD AUTO: 7.6 K/UL (ref 4.1–11.1)

## 2025-08-28 LAB — APO B SERPL-MCNC: 68 MG/DL

## 2025-08-29 LAB — LPA SERPL-SCNC: 15.3 NMOL/L

## (undated) DEVICE — CABLE CATHETER CONN L196CM 10 PIN ACHVE ADV

## (undated) DEVICE — SUTURE NONABSORBABLE MONOFILAMENT 3-0 PS-1 18 IN BLK ETHILON 1663H

## (undated) DEVICE — PADPRO DEFIBRILLATION/PACING/CARDIOVERSION/MONITORING ELECTRODES, ADULT/CHILD GREATER THAN 10 KG RADIOTRANSPARENT ELECTRODE, PHYSIO-CONTROL QUIK-COMBO (M) 60" (152 CM): Brand: PADPRO

## (undated) DEVICE — BANDAGE COMPR W6INXL10YD ST M E WHITE/BEIGE

## (undated) DEVICE — BLADE,CARBON-STEEL,15,STRL,DISPOSABLE,TB: Brand: MEDLINE

## (undated) DEVICE — HOOD: Brand: FLYTE

## (undated) DEVICE — COVER,MAYO STAND,STERILE: Brand: MEDLINE

## (undated) DEVICE — SUTURE STRATAFIX SYMMETRIC SZ 1 L18IN ABSRB VLT CT1 L36CM SXPP1A404

## (undated) DEVICE — PADDING CST 6IN STERILE --

## (undated) DEVICE — CATHETER EP ADOL AD 9FR L90CM TEMP SGL HND SELF LOK 4 W TIP

## (undated) DEVICE — GLOVE SURG SZ 8 L12IN FNGR THK94MIL STD WHT LTX FREE

## (undated) DEVICE — BLUNTFILL: Brand: MONOJECT

## (undated) DEVICE — Device

## (undated) DEVICE — KIT CATH ELECTROPHYSIOLOGY SURFACE ELECTRODE ENSITE X PATCH

## (undated) DEVICE — APPLICATOR MEDICATED 10.5 CC SOLUTION CLR STRL CHLORAPREP

## (undated) DEVICE — CABLE ABLATION CATH INTFACE PULSESELECT

## (undated) DEVICE — WASTE KIT - ST MARY: Brand: MEDLINE INDUSTRIES, INC.

## (undated) DEVICE — CABLE CATH L210CM 10 PIN PROTECTED SURELINK WOVENFLEXIE

## (undated) DEVICE — GLOVE ORTHO 8   MSG9480

## (undated) DEVICE — BAND COMPR L29CM XLN CLR PLAS HEMSTAT EXT HK AND LOOP RETEN

## (undated) DEVICE — PRESSURE MONITORING SET: Brand: TRUWAVE

## (undated) DEVICE — BANDAGE,GAUZE,CONFORMING,3"X75",STRL,LF: Brand: MEDLINE

## (undated) DEVICE — HEART CATH-MRMC: Brand: MEDLINE INDUSTRIES, INC.

## (undated) DEVICE — STRYKER PERFORMANCE SERIES SAGITTAL BLADE: Brand: STRYKER PERFORMANCE SERIES

## (undated) DEVICE — THERMOGARD PLUS ABC DUAL DISPERSIVE ELECTRODE: Brand: THERMOGARD

## (undated) DEVICE — SUTURE VCRL SZ 2-0 L27IN ABSRB UD L26MM SH 1/2 CIR J417H

## (undated) DEVICE — INTRODUCER SHTH 11FR CANN L23CM DIL TIP 45MM YEL W/O MINI

## (undated) DEVICE — ELECTRODE PT RET AD L9FT HI MOIST COND ADH HYDRGEL CORDED

## (undated) DEVICE — SOLUTION IRRIG 1000ML 0.9% SOD CHL USP POUR PLAS BTL

## (undated) DEVICE — SYRINGE MED 10ML LUERLOCK TIP W/O SFTY DISP

## (undated) DEVICE — BANDAGE COMPR W3INXL5YD WHT BGE POLY COT M E WRP WV HK AND

## (undated) DEVICE — CABLE CATHETER PINX10 ENSITE X LIVEWIRE/RESPONSE

## (undated) DEVICE — TAPE,CLOTH/SILK,CURAD,3"X10YD,LF,40/CS: Brand: CURAD

## (undated) DEVICE — SUTURE ETHBND EXCEL SZ 0 L36IN NONABSORBABLE GRN SH L26MM X524H

## (undated) DEVICE — BLADE SURG SAW STD S STL OSC W/ SERR EDGE DISP

## (undated) DEVICE — ANGIOGRAPHY KIT

## (undated) DEVICE — SYR LR LCK 1ML GRAD NSAF 30ML --

## (undated) DEVICE — COVER LT HNDL PLAS RIG 1 PER PK

## (undated) DEVICE — CATHETER REPROC EP 2-5-2 MM BAR201101R

## (undated) DEVICE — CATHETER ELECTROPHYSIOLOGY XL 2-5-2 MM 6 FRX115 CM LIVEWIRE

## (undated) DEVICE — CATHETER CRYOABLATION 28 MM ARCTIC FRONT ADV

## (undated) DEVICE — GLOVE SURG SZ 8 L12IN FNGR THK79MIL GRN LTX FREE

## (undated) DEVICE — CATHETER ABLATION PFA LOOP STRL DISP PULSESELECT

## (undated) DEVICE — CATHETER MAP 20 MM ACHVE

## (undated) DEVICE — PINNACLE INTRODUCER SHEATH: Brand: PINNACLE

## (undated) DEVICE — GLIDESHEATH SLENDER STAINLESS STEEL KIT: Brand: GLIDESHEATH SLENDER

## (undated) DEVICE — KIT HND CTRL 3 W STPCOCK ROT END 54IN PREM HI PRSS TBNG AT

## (undated) DEVICE — SYSTEM TRANSSEPTAL ACCESS ACQCROSS QX FC 65CM

## (undated) DEVICE — Device: Brand: JELCO

## (undated) DEVICE — ELECTRODE BLDE L4IN NONINSULATED EDGE

## (undated) DEVICE — CATHETER REPROC ELECTROPHYSIOLOGY 2-5-2 MM BAR201101R

## (undated) DEVICE — SHEATH CATH 12FR L65CM INTVASC VLV STEER RADPQ MRK FLEXCATH

## (undated) DEVICE — DRAPE,EXTREMITY,89X128,STERILE: Brand: MEDLINE

## (undated) DEVICE — KIT INT FIX FEM TIB CKPT MAKOPLASTY

## (undated) DEVICE — PAD GROUNDING ADLT ADH FOIL 9FT CORD UNIV

## (undated) DEVICE — KIT DRP FOR RIO ROBOTIC ARM ASST SYS

## (undated) DEVICE — PACK PROCEDURE SURG HRT CATH

## (undated) DEVICE — PREP KIT PEEL PTCH POVIDONE IOD

## (undated) DEVICE — ELECTRODE,RADIOTRANSLUCENT,FOAM,5PK: Brand: MEDLINE

## (undated) DEVICE — CABLE ELECTR UMB CATH

## (undated) DEVICE — AMPLATZ EXTRA STIFF WIRE GUIDE: Brand: AMPLATZ

## (undated) DEVICE — CATH 5F 100CM JL35 -- DXTERITY

## (undated) DEVICE — DUAL IRRIGATION ADAPTOR

## (undated) DEVICE — SUTURE MCRYL SZ 3-0 L27IN ABSRB UD L19MM PS-2 3/8 CIR PRIM Y427H

## (undated) DEVICE — GLOVE SURG SZ 9 L12IN FNGR THK79MIL GRN LTX FREE

## (undated) DEVICE — KIT CATH EP SURFACE ELECTRODE ENSITE X PATCH

## (undated) DEVICE — KIT TRK KNEE PROC VIZADISC

## (undated) DEVICE — MEDI-TRACE CADENCE ADULT, DEFIBRILLATION ELECTRODE -RTS  (10 PR/PK) - PHYSIO-CONTROL: Brand: MEDI-TRACE CADENCE

## (undated) DEVICE — TOTAL JOINT-SFMC: Brand: MEDLINE INDUSTRIES, INC.

## (undated) DEVICE — CABLE EP L8FT EXTN CATH SAFIRE 1642 TX

## (undated) DEVICE — EXTREMITY - SMH: Brand: MEDLINE INDUSTRIES, INC.

## (undated) DEVICE — KIT MFLD ISOLATN NACL CNTRST PRT TBNG SPIK W/ PRSS TRNSDUC

## (undated) DEVICE — MTS LEFT HEART KIT ST MARY'S RICHMOND: Brand: NAMIC

## (undated) DEVICE — SYSTEM CLOSURE 6-12 FR VEN VASC VASCADE MVP

## (undated) DEVICE — KIT MED IMAG CNTRST AGNT W/ IOPAMIDOL REUSE

## (undated) DEVICE — PIN BNE 3.2X140MM STRL --

## (undated) DEVICE — WASTE BAG SSRV: Brand: MEDLINE INDUSTRIES, INC.

## (undated) DEVICE — SPECIAL PROCEDURE DRAPE 32" X 34": Brand: SPECIAL PROCEDURE DRAPE

## (undated) DEVICE — DRESSING ANTIMIC FOAM OPTIFOAM POSTOP ADH 4X14 IN

## (undated) DEVICE — DRESSING PETRO W3XL3IN OIL EMUL N ADH GZ KNIT IMPREG CELOS

## (undated) DEVICE — CABLE ABLAT L183CM CRYO COAX UMB EVAC HOSE DISP CRYOCONSOLE

## (undated) DEVICE — PERCLOSE PROGLIDE™ SUTURE-MEDIATED CLOSURE SYSTEM: Brand: PERCLOSE PROGLIDE™

## (undated) DEVICE — SYRINGE 20ML LL S/C 50

## (undated) DEVICE — 3M™ TEGADERM™ TRANSPARENT FILM DRESSING FRAME STYLE, 1626W, 4 IN X 4-3/4 IN (10 CM X 12 CM), 50/CT 4CT/CASE: Brand: 3M™ TEGADERM™

## (undated) DEVICE — CATH 5F 100CM JR40 -- DXTERITY

## (undated) DEVICE — SUTURE VCRL SZ 2-0 L36IN ABSRB UD L36MM CT-1 1/2 CIR J945H

## (undated) DEVICE — PROVE COVER: Brand: UNBRANDED

## (undated) DEVICE — SHEATH CATH L 65.5 CM DIA10 FR SZ 13 MM STEER STRL DISP

## (undated) DEVICE — 1 X VERSACROSS STEERABLE SHEATH (INCLUDING  1 X DILATOR AND 1 X J-TIP GUIDEWIRE); 1 X VERSACROSS RF WIRE (INCLUDING 1 X CONNECTOR CABLE (SINGLE USE)); 1 X DISPERSIVE ELECTRODE: Brand: VERSACROSS STEERABLE ACCESS SOLUTION

## (undated) DEVICE — BAND RADIAL COMPR ARTERY 24CM -- REG BX/10

## (undated) DEVICE — SOLUTION SURG PREP 26 CC PURPREP

## (undated) DEVICE — DISPOSABLE TOURNIQUET CUFF SINGLE BLADDER, DUAL PORT AND QUICK CONNECT CONNECTOR: Brand: COLOR CUFF

## (undated) DEVICE — SUTURE VCRL + SZ 1-0 L36IN ABSRB UD CTX 1/2 CIR TAPR PNT VCP977H

## (undated) DEVICE — SPONGE GZ W4XL4IN COT 12 PLY TYP VII WVN C FLD DSGN

## (undated) DEVICE — SOLUTION IRRIG 3000ML 0.9% SOD CHL USP UROMATIC PLAS CONT

## (undated) DEVICE — GLOVE SURG SZ 85 L12IN FNGR THK79MIL GRN LTX FREE

## (undated) DEVICE — 1010 S-DRAPE TOWEL DRAPE 10/BX: Brand: STERI-DRAPE™

## (undated) DEVICE — COPILOT BLEEDBACK CONTROL VALVE: Brand: COPILOT

## (undated) DEVICE — SUTURE ETHLN SZ 4-0 L18IN NONABSORBABLE BLK L16MM PC-3 3/8 1864G

## (undated) DEVICE — SUTURE MCRYL SZ 3-0 L27IN ABSRB UD L24MM PS-1 3/8 CIR PRIM Y936H

## (undated) DEVICE — GLOVE SURG SZ 85 L12IN FNGR ORTHO 126MIL CRM LTX FREE

## (undated) DEVICE — SYSTEM RETENTION PANNUS ADH PD HK LOOP STRP